# Patient Record
Sex: FEMALE | NOT HISPANIC OR LATINO | Employment: OTHER | URBAN - METROPOLITAN AREA
[De-identification: names, ages, dates, MRNs, and addresses within clinical notes are randomized per-mention and may not be internally consistent; named-entity substitution may affect disease eponyms.]

---

## 2017-01-10 ENCOUNTER — COMMUNICATION - HEALTHEAST (OUTPATIENT)
Dept: FAMILY MEDICINE | Facility: CLINIC | Age: 62
End: 2017-01-10

## 2017-01-10 DIAGNOSIS — E78.5 HYPERLIPIDEMIA: ICD-10-CM

## 2017-02-16 ENCOUNTER — OFFICE VISIT - HEALTHEAST (OUTPATIENT)
Dept: FAMILY MEDICINE | Facility: CLINIC | Age: 62
End: 2017-02-16

## 2017-02-16 DIAGNOSIS — E78.5 HYPERLIPIDEMIA: ICD-10-CM

## 2017-02-16 DIAGNOSIS — M81.0 OSTEOPOROSIS: ICD-10-CM

## 2017-02-16 DIAGNOSIS — Z00.00 ROUTINE GENERAL MEDICAL EXAMINATION AT A HEALTH CARE FACILITY: ICD-10-CM

## 2017-02-16 DIAGNOSIS — D56.8 OTHER THALASSEMIA (H): ICD-10-CM

## 2017-02-16 DIAGNOSIS — E55.9 VITAMIN D DEFICIENCY: ICD-10-CM

## 2017-02-16 DIAGNOSIS — E78.5 HYPERLIPIDEMIA, UNSPECIFIED HYPERLIPIDEMIA TYPE: ICD-10-CM

## 2017-02-16 ASSESSMENT — MIFFLIN-ST. JEOR: SCORE: 1061.6

## 2017-02-23 ENCOUNTER — AMBULATORY - HEALTHEAST (OUTPATIENT)
Dept: LAB | Facility: CLINIC | Age: 62
End: 2017-02-23

## 2017-02-23 DIAGNOSIS — E55.9 VITAMIN D DEFICIENCY: ICD-10-CM

## 2017-02-23 DIAGNOSIS — Z00.00 ROUTINE GENERAL MEDICAL EXAMINATION AT A HEALTH CARE FACILITY: ICD-10-CM

## 2017-02-23 DIAGNOSIS — E78.5 HYPERLIPIDEMIA, UNSPECIFIED HYPERLIPIDEMIA TYPE: ICD-10-CM

## 2017-02-23 DIAGNOSIS — M81.0 OSTEOPOROSIS: ICD-10-CM

## 2017-02-23 DIAGNOSIS — D56.8 OTHER THALASSEMIA (H): ICD-10-CM

## 2017-02-23 LAB
CHOLEST SERPL-MCNC: 161 MG/DL
FASTING STATUS PATIENT QL REPORTED: YES
HDLC SERPL-MCNC: 46 MG/DL
LDLC SERPL CALC-MCNC: 96 MG/DL
TRIGL SERPL-MCNC: 96 MG/DL

## 2017-02-24 ENCOUNTER — COMMUNICATION - HEALTHEAST (OUTPATIENT)
Dept: FAMILY MEDICINE | Facility: CLINIC | Age: 62
End: 2017-02-24

## 2017-03-02 ENCOUNTER — AMBULATORY - HEALTHEAST (OUTPATIENT)
Dept: LAB | Facility: CLINIC | Age: 62
End: 2017-03-02

## 2017-03-02 DIAGNOSIS — Z00.00 ROUTINE GENERAL MEDICAL EXAMINATION AT A HEALTH CARE FACILITY: ICD-10-CM

## 2017-03-03 ENCOUNTER — COMMUNICATION - HEALTHEAST (OUTPATIENT)
Dept: FAMILY MEDICINE | Facility: CLINIC | Age: 62
End: 2017-03-03

## 2017-03-03 DIAGNOSIS — E78.5 HYPERLIPIDEMIA: ICD-10-CM

## 2017-03-09 ENCOUNTER — COMMUNICATION - HEALTHEAST (OUTPATIENT)
Dept: FAMILY MEDICINE | Facility: CLINIC | Age: 62
End: 2017-03-09

## 2017-06-06 ENCOUNTER — RECORDS - HEALTHEAST (OUTPATIENT)
Dept: BONE DENSITY | Facility: CLINIC | Age: 62
End: 2017-06-06

## 2017-06-06 ENCOUNTER — OFFICE VISIT - HEALTHEAST (OUTPATIENT)
Dept: FAMILY MEDICINE | Facility: CLINIC | Age: 62
End: 2017-06-06

## 2017-06-06 ENCOUNTER — RECORDS - HEALTHEAST (OUTPATIENT)
Dept: ADMINISTRATIVE | Facility: OTHER | Age: 62
End: 2017-06-06

## 2017-06-06 DIAGNOSIS — J00 ACUTE NASOPHARYNGITIS: ICD-10-CM

## 2017-06-06 DIAGNOSIS — M81.0 AGE-RELATED OSTEOPOROSIS WITHOUT CURRENT PATHOLOGICAL FRACTURE: ICD-10-CM

## 2017-06-06 ASSESSMENT — MIFFLIN-ST. JEOR: SCORE: 1038.02

## 2017-07-20 ENCOUNTER — OFFICE VISIT - HEALTHEAST (OUTPATIENT)
Dept: INTERNAL MEDICINE | Facility: CLINIC | Age: 62
End: 2017-07-20

## 2017-07-20 DIAGNOSIS — M81.0 OSTEOPOROSIS: ICD-10-CM

## 2018-01-25 ENCOUNTER — AMBULATORY - HEALTHEAST (OUTPATIENT)
Dept: NURSING | Facility: CLINIC | Age: 63
End: 2018-01-25

## 2018-01-25 DIAGNOSIS — M81.0 OSTEOPOROSIS: ICD-10-CM

## 2018-03-02 ENCOUNTER — OFFICE VISIT - HEALTHEAST (OUTPATIENT)
Dept: FAMILY MEDICINE | Facility: CLINIC | Age: 63
End: 2018-03-02

## 2018-03-02 DIAGNOSIS — M81.0 OSTEOPOROSIS: ICD-10-CM

## 2018-03-02 DIAGNOSIS — L90.5 BURN SCAR: ICD-10-CM

## 2018-03-02 DIAGNOSIS — E78.5 HYPERLIPIDEMIA, UNSPECIFIED HYPERLIPIDEMIA TYPE: ICD-10-CM

## 2018-03-02 DIAGNOSIS — Z00.00 ROUTINE GENERAL MEDICAL EXAMINATION AT A HEALTH CARE FACILITY: ICD-10-CM

## 2018-03-02 DIAGNOSIS — E55.9 VITAMIN D DEFICIENCY: ICD-10-CM

## 2018-03-02 DIAGNOSIS — Z12.31 VISIT FOR SCREENING MAMMOGRAM: ICD-10-CM

## 2018-03-02 DIAGNOSIS — M25.561 RIGHT KNEE PAIN: ICD-10-CM

## 2018-03-02 LAB
ALBUMIN SERPL-MCNC: 3.6 G/DL (ref 3.5–5)
ALP SERPL-CCNC: 70 U/L (ref 45–120)
ALT SERPL W P-5'-P-CCNC: 20 U/L (ref 0–45)
ANION GAP SERPL CALCULATED.3IONS-SCNC: 8 MMOL/L (ref 5–18)
AST SERPL W P-5'-P-CCNC: 15 U/L (ref 0–40)
BILIRUB SERPL-MCNC: 1 MG/DL (ref 0–1)
BUN SERPL-MCNC: 15 MG/DL (ref 8–22)
CALCIUM SERPL-MCNC: 10 MG/DL (ref 8.5–10.5)
CHLORIDE BLD-SCNC: 106 MMOL/L (ref 98–107)
CHOLEST SERPL-MCNC: 178 MG/DL
CO2 SERPL-SCNC: 25 MMOL/L (ref 22–31)
CREAT SERPL-MCNC: 0.74 MG/DL (ref 0.6–1.1)
FASTING STATUS PATIENT QL REPORTED: YES
GFR SERPL CREATININE-BSD FRML MDRD: >60 ML/MIN/1.73M2
GLUCOSE BLD-MCNC: 87 MG/DL (ref 70–125)
HDLC SERPL-MCNC: 49 MG/DL
LDLC SERPL CALC-MCNC: 102 MG/DL
POTASSIUM BLD-SCNC: 4.2 MMOL/L (ref 3.5–5)
PROT SERPL-MCNC: 7.2 G/DL (ref 6–8)
SODIUM SERPL-SCNC: 139 MMOL/L (ref 136–145)
TRIGL SERPL-MCNC: 137 MG/DL

## 2018-03-02 ASSESSMENT — MIFFLIN-ST. JEOR: SCORE: 1066.14

## 2018-03-05 ENCOUNTER — COMMUNICATION - HEALTHEAST (OUTPATIENT)
Dept: FAMILY MEDICINE | Facility: CLINIC | Age: 63
End: 2018-03-05

## 2018-03-05 LAB — 25(OH)D3 SERPL-MCNC: 61.7 NG/ML (ref 30–80)

## 2018-03-13 ENCOUNTER — HOSPITAL ENCOUNTER (OUTPATIENT)
Dept: MAMMOGRAPHY | Facility: CLINIC | Age: 63
Discharge: HOME | End: 2018-03-13
Attending: FAMILY MEDICINE

## 2018-03-13 DIAGNOSIS — Z12.31 VISIT FOR SCREENING MAMMOGRAM: ICD-10-CM

## 2018-03-16 ENCOUNTER — AMBULATORY - HEALTHEAST (OUTPATIENT)
Dept: LAB | Facility: CLINIC | Age: 63
End: 2018-03-16

## 2018-03-16 DIAGNOSIS — Z00.00 ROUTINE GENERAL MEDICAL EXAMINATION AT A HEALTH CARE FACILITY: ICD-10-CM

## 2018-03-16 LAB
HEMOCCULT SP1 STL QL: NEGATIVE
HEMOCCULT SP2 STL QL: NEGATIVE
HEMOCCULT SP3 STL QL: NEGATIVE

## 2018-03-17 ENCOUNTER — COMMUNICATION - HEALTHEAST (OUTPATIENT)
Dept: FAMILY MEDICINE | Facility: CLINIC | Age: 63
End: 2018-03-17

## 2018-03-17 DIAGNOSIS — E78.5 HYPERLIPIDEMIA: ICD-10-CM

## 2018-03-28 ENCOUNTER — COMMUNICATION - HEALTHEAST (OUTPATIENT)
Dept: FAMILY MEDICINE | Facility: CLINIC | Age: 63
End: 2018-03-28

## 2018-04-03 ENCOUNTER — COMMUNICATION - HEALTHEAST (OUTPATIENT)
Dept: FAMILY MEDICINE | Facility: CLINIC | Age: 63
End: 2018-04-03

## 2018-04-04 ENCOUNTER — AMBULATORY - HEALTHEAST (OUTPATIENT)
Dept: FAMILY MEDICINE | Facility: CLINIC | Age: 63
End: 2018-04-04

## 2018-04-05 ENCOUNTER — AMBULATORY - HEALTHEAST (OUTPATIENT)
Dept: NURSING | Facility: CLINIC | Age: 63
End: 2018-04-05

## 2018-04-06 ENCOUNTER — RECORDS - HEALTHEAST (OUTPATIENT)
Dept: ADMINISTRATIVE | Facility: OTHER | Age: 63
End: 2018-04-06

## 2018-04-13 ENCOUNTER — RECORDS - HEALTHEAST (OUTPATIENT)
Dept: ADMINISTRATIVE | Facility: OTHER | Age: 63
End: 2018-04-13

## 2018-04-20 ENCOUNTER — RECORDS - HEALTHEAST (OUTPATIENT)
Dept: ADMINISTRATIVE | Facility: OTHER | Age: 63
End: 2018-04-20

## 2018-04-26 ENCOUNTER — COMMUNICATION - HEALTHEAST (OUTPATIENT)
Dept: FAMILY MEDICINE | Facility: CLINIC | Age: 63
End: 2018-04-26

## 2018-07-27 ENCOUNTER — OFFICE VISIT - HEALTHEAST (OUTPATIENT)
Dept: INTERNAL MEDICINE | Facility: CLINIC | Age: 63
End: 2018-07-27

## 2018-07-27 DIAGNOSIS — M81.0 AGE-RELATED OSTEOPOROSIS WITHOUT CURRENT PATHOLOGICAL FRACTURE: ICD-10-CM

## 2018-10-05 ENCOUNTER — OFFICE VISIT - HEALTHEAST (OUTPATIENT)
Dept: FAMILY MEDICINE | Facility: CLINIC | Age: 63
End: 2018-10-05

## 2018-10-05 ENCOUNTER — COMMUNICATION - HEALTHEAST (OUTPATIENT)
Dept: FAMILY MEDICINE | Facility: CLINIC | Age: 63
End: 2018-10-05

## 2018-10-05 DIAGNOSIS — Z71.84 TRAVEL ADVICE ENCOUNTER: ICD-10-CM

## 2018-10-05 DIAGNOSIS — E78.5 HYPERLIPIDEMIA: ICD-10-CM

## 2019-02-08 ENCOUNTER — AMBULATORY - HEALTHEAST (OUTPATIENT)
Dept: NURSING | Facility: CLINIC | Age: 64
End: 2019-02-08

## 2019-03-19 ENCOUNTER — COMMUNICATION - HEALTHEAST (OUTPATIENT)
Dept: FAMILY MEDICINE | Facility: CLINIC | Age: 64
End: 2019-03-19

## 2019-03-19 DIAGNOSIS — E78.5 HYPERLIPIDEMIA, UNSPECIFIED HYPERLIPIDEMIA TYPE: ICD-10-CM

## 2019-03-22 ENCOUNTER — OFFICE VISIT - HEALTHEAST (OUTPATIENT)
Dept: FAMILY MEDICINE | Facility: CLINIC | Age: 64
End: 2019-03-22

## 2019-03-22 DIAGNOSIS — Z00.00 ROUTINE GENERAL MEDICAL EXAMINATION AT A HEALTH CARE FACILITY: ICD-10-CM

## 2019-03-22 DIAGNOSIS — E78.5 HYPERLIPIDEMIA, UNSPECIFIED HYPERLIPIDEMIA TYPE: ICD-10-CM

## 2019-03-22 DIAGNOSIS — D56.8 OTHER THALASSEMIA (H): ICD-10-CM

## 2019-03-22 LAB
ALBUMIN SERPL-MCNC: 3.9 G/DL (ref 3.5–5)
ALP SERPL-CCNC: 67 U/L (ref 45–120)
ALT SERPL W P-5'-P-CCNC: 19 U/L (ref 0–45)
ANION GAP SERPL CALCULATED.3IONS-SCNC: 8 MMOL/L (ref 5–18)
AST SERPL W P-5'-P-CCNC: 16 U/L (ref 0–40)
BILIRUB SERPL-MCNC: 0.9 MG/DL (ref 0–1)
BUN SERPL-MCNC: 12 MG/DL (ref 8–22)
CALCIUM SERPL-MCNC: 9.5 MG/DL (ref 8.5–10.5)
CHLORIDE BLD-SCNC: 107 MMOL/L (ref 98–107)
CHOLEST SERPL-MCNC: 175 MG/DL
CO2 SERPL-SCNC: 25 MMOL/L (ref 22–31)
CREAT SERPL-MCNC: 0.81 MG/DL (ref 0.6–1.1)
FASTING STATUS PATIENT QL REPORTED: YES
GFR SERPL CREATININE-BSD FRML MDRD: >60 ML/MIN/1.73M2
GLUCOSE BLD-MCNC: 91 MG/DL (ref 70–125)
HDLC SERPL-MCNC: 49 MG/DL
LDLC SERPL CALC-MCNC: 98 MG/DL
POTASSIUM BLD-SCNC: 4.2 MMOL/L (ref 3.5–5)
PROT SERPL-MCNC: 7.2 G/DL (ref 6–8)
SODIUM SERPL-SCNC: 140 MMOL/L (ref 136–145)
TRIGL SERPL-MCNC: 138 MG/DL

## 2019-03-22 ASSESSMENT — MIFFLIN-ST. JEOR: SCORE: 1075.43

## 2019-03-25 LAB
HPV SOURCE: NORMAL
HUMAN PAPILLOMA VIRUS 16 DNA: NEGATIVE
HUMAN PAPILLOMA VIRUS 18 DNA: NEGATIVE
HUMAN PAPILLOMA VIRUS FINAL DIAGNOSIS: NORMAL
HUMAN PAPILLOMA VIRUS OTHER HR: NEGATIVE
SPECIMEN DESCRIPTION: NORMAL

## 2019-04-02 LAB
BKR LAB AP ABNORMAL BLEEDING: NO
BKR LAB AP BIRTH CONTROL/HORMONES: ABNORMAL
BKR LAB AP CERVICAL APPEARANCE: ABNORMAL
BKR LAB AP GYN ADEQUACY: ABNORMAL
BKR LAB AP GYN INTERPRETATION: ABNORMAL
BKR LAB AP HPV REFLEX: ABNORMAL
BKR LAB AP LMP: ABNORMAL
BKR LAB AP PATIENT STATUS: ABNORMAL
BKR LAB AP PREVIOUS ABNORMAL: ABNORMAL
BKR LAB AP PREVIOUS NORMAL: ABNORMAL
HIGH RISK?: NO
INTERPRETING LAB: ABNORMAL
PATH REPORT.COMMENTS IMP SPEC: ABNORMAL
RESULT FLAG (HE HISTORICAL CONVERSION): ABNORMAL

## 2019-04-03 ENCOUNTER — COMMUNICATION - HEALTHEAST (OUTPATIENT)
Dept: FAMILY MEDICINE | Facility: CLINIC | Age: 64
End: 2019-04-03

## 2019-04-04 ENCOUNTER — RECORDS - HEALTHEAST (OUTPATIENT)
Dept: ADMINISTRATIVE | Facility: OTHER | Age: 64
End: 2019-04-04

## 2019-04-04 LAB — COLOGUARD-ABSTRACT: NEGATIVE

## 2019-04-11 ENCOUNTER — HOSPITAL ENCOUNTER (OUTPATIENT)
Dept: MAMMOGRAPHY | Facility: CLINIC | Age: 64
Discharge: HOME | End: 2019-04-11
Attending: FAMILY MEDICINE

## 2019-04-11 DIAGNOSIS — E78.5 HYPERLIPIDEMIA, UNSPECIFIED HYPERLIPIDEMIA TYPE: ICD-10-CM

## 2019-04-23 ENCOUNTER — RECORDS - HEALTHEAST (OUTPATIENT)
Dept: HEALTH INFORMATION MANAGEMENT | Facility: CLINIC | Age: 64
End: 2019-04-23

## 2019-04-23 ENCOUNTER — COMMUNICATION - HEALTHEAST (OUTPATIENT)
Dept: FAMILY MEDICINE | Facility: CLINIC | Age: 64
End: 2019-04-23

## 2019-04-28 ENCOUNTER — COMMUNICATION - HEALTHEAST (OUTPATIENT)
Dept: FAMILY MEDICINE | Facility: CLINIC | Age: 64
End: 2019-04-28

## 2019-05-09 ENCOUNTER — COMMUNICATION - HEALTHEAST (OUTPATIENT)
Dept: FAMILY MEDICINE | Facility: CLINIC | Age: 64
End: 2019-05-09

## 2019-05-10 ENCOUNTER — COMMUNICATION - HEALTHEAST (OUTPATIENT)
Dept: FAMILY MEDICINE | Facility: CLINIC | Age: 64
End: 2019-05-10

## 2019-06-11 ENCOUNTER — RECORDS - HEALTHEAST (OUTPATIENT)
Dept: BONE DENSITY | Facility: CLINIC | Age: 64
End: 2019-06-11

## 2019-06-11 ENCOUNTER — RECORDS - HEALTHEAST (OUTPATIENT)
Dept: ADMINISTRATIVE | Facility: OTHER | Age: 64
End: 2019-06-11

## 2019-06-11 DIAGNOSIS — M81.0 AGE-RELATED OSTEOPOROSIS WITHOUT CURRENT PATHOLOGICAL FRACTURE: ICD-10-CM

## 2019-08-08 ENCOUNTER — OFFICE VISIT - HEALTHEAST (OUTPATIENT)
Dept: INTERNAL MEDICINE | Facility: CLINIC | Age: 64
End: 2019-08-08

## 2019-08-08 DIAGNOSIS — M81.0 AGE-RELATED OSTEOPOROSIS WITHOUT CURRENT PATHOLOGICAL FRACTURE: ICD-10-CM

## 2019-10-03 ENCOUNTER — COMMUNICATION - HEALTHEAST (OUTPATIENT)
Dept: FAMILY MEDICINE | Facility: CLINIC | Age: 64
End: 2019-10-03

## 2019-10-28 ENCOUNTER — OFFICE VISIT - HEALTHEAST (OUTPATIENT)
Dept: FAMILY MEDICINE | Facility: CLINIC | Age: 64
End: 2019-10-28

## 2019-10-28 DIAGNOSIS — S92.421A DISPLACED FRACTURE OF DISTAL PHALANX OF RIGHT GREAT TOE, INITIAL ENCOUNTER FOR CLOSED FRACTURE: ICD-10-CM

## 2019-10-28 DIAGNOSIS — S99.922A INJURY OF LEFT GREAT TOE, INITIAL ENCOUNTER: ICD-10-CM

## 2019-10-29 ENCOUNTER — RECORDS - HEALTHEAST (OUTPATIENT)
Dept: ADMINISTRATIVE | Facility: OTHER | Age: 64
End: 2019-10-29

## 2019-11-05 ENCOUNTER — RECORDS - HEALTHEAST (OUTPATIENT)
Dept: ADMINISTRATIVE | Facility: OTHER | Age: 64
End: 2019-11-05

## 2019-12-04 ENCOUNTER — RECORDS - HEALTHEAST (OUTPATIENT)
Dept: ADMINISTRATIVE | Facility: OTHER | Age: 64
End: 2019-12-04

## 2020-01-21 ENCOUNTER — AMBULATORY - HEALTHEAST (OUTPATIENT)
Dept: INTERNAL MEDICINE | Facility: CLINIC | Age: 65
End: 2020-01-21

## 2020-01-21 DIAGNOSIS — M81.0 AGE-RELATED OSTEOPOROSIS WITHOUT CURRENT PATHOLOGICAL FRACTURE: ICD-10-CM

## 2020-01-23 ENCOUNTER — AMBULATORY - HEALTHEAST (OUTPATIENT)
Dept: INTERNAL MEDICINE | Facility: CLINIC | Age: 65
End: 2020-01-23

## 2020-01-23 DIAGNOSIS — M81.0 AGE-RELATED OSTEOPOROSIS WITHOUT CURRENT PATHOLOGICAL FRACTURE: ICD-10-CM

## 2020-02-03 ENCOUNTER — COMMUNICATION - HEALTHEAST (OUTPATIENT)
Dept: INTERNAL MEDICINE | Facility: CLINIC | Age: 65
End: 2020-02-03

## 2020-02-03 DIAGNOSIS — M81.8 OSTEOPOROSIS, IDIOPATHIC: ICD-10-CM

## 2020-02-03 DIAGNOSIS — M81.0 AGE-RELATED OSTEOPOROSIS WITHOUT CURRENT PATHOLOGICAL FRACTURE: ICD-10-CM

## 2020-02-11 ENCOUNTER — AMBULATORY - HEALTHEAST (OUTPATIENT)
Dept: NURSING | Facility: CLINIC | Age: 65
End: 2020-02-11

## 2020-06-09 ENCOUNTER — COMMUNICATION - HEALTHEAST (OUTPATIENT)
Dept: FAMILY MEDICINE | Facility: CLINIC | Age: 65
End: 2020-06-09

## 2020-06-11 ENCOUNTER — COMMUNICATION - HEALTHEAST (OUTPATIENT)
Dept: CARDIOLOGY | Facility: CLINIC | Age: 65
End: 2020-06-11

## 2020-06-12 ENCOUNTER — OFFICE VISIT - HEALTHEAST (OUTPATIENT)
Dept: CARDIOLOGY | Facility: CLINIC | Age: 65
End: 2020-06-12

## 2020-06-12 DIAGNOSIS — R07.2 PRECORDIAL PAIN: ICD-10-CM

## 2020-06-12 ASSESSMENT — MIFFLIN-ST. JEOR: SCORE: 1097.2

## 2020-06-30 ENCOUNTER — HOSPITAL ENCOUNTER (OUTPATIENT)
Dept: CT IMAGING | Facility: CLINIC | Age: 65
Discharge: HOME | End: 2020-06-30
Attending: INTERNAL MEDICINE

## 2020-06-30 DIAGNOSIS — R07.2 PRECORDIAL PAIN: ICD-10-CM

## 2020-06-30 LAB
BSA FOR ECHO PROCEDURE: 0 M2
CV CALCIUM SCORE AGATSTON LM: 17
CV CALCIUM SCORING AGATSON LAD: 0
CV CALCIUM SCORING AGATSTON CX: 0
CV CALCIUM SCORING AGATSTON RCA: 0
CV CALCIUM SCORING AGATSTON TOTAL: 17
LEFT VENTRICLE HEART RATE: 63 BPM

## 2020-07-02 ENCOUNTER — COMMUNICATION - HEALTHEAST (OUTPATIENT)
Dept: CARDIOLOGY | Facility: CLINIC | Age: 65
End: 2020-07-02

## 2020-07-02 DIAGNOSIS — R07.2 PRECORDIAL PAIN: ICD-10-CM

## 2020-07-06 ENCOUNTER — AMBULATORY - HEALTHEAST (OUTPATIENT)
Dept: CARDIOLOGY | Facility: CLINIC | Age: 65
End: 2020-07-06

## 2020-07-06 DIAGNOSIS — Z11.59 ENCOUNTER FOR SCREENING FOR OTHER VIRAL DISEASES: ICD-10-CM

## 2020-07-13 ENCOUNTER — COMMUNICATION - HEALTHEAST (OUTPATIENT)
Dept: CARDIOLOGY | Facility: CLINIC | Age: 65
End: 2020-07-13

## 2020-07-14 ENCOUNTER — OFFICE VISIT - HEALTHEAST (OUTPATIENT)
Dept: CARDIOLOGY | Facility: CLINIC | Age: 65
End: 2020-07-14

## 2020-07-14 ENCOUNTER — SURGERY - HEALTHEAST (OUTPATIENT)
Dept: CARDIOLOGY | Facility: CLINIC | Age: 65
End: 2020-07-14

## 2020-07-14 ENCOUNTER — COMMUNICATION - HEALTHEAST (OUTPATIENT)
Dept: CARDIOLOGY | Facility: CLINIC | Age: 65
End: 2020-07-14

## 2020-07-14 DIAGNOSIS — I25.119 CORONARY ARTERY DISEASE INVOLVING NATIVE CORONARY ARTERY OF NATIVE HEART WITH ANGINA PECTORIS (H): ICD-10-CM

## 2020-07-14 ASSESSMENT — MIFFLIN-ST. JEOR: SCORE: 1079.06

## 2020-07-17 ENCOUNTER — OFFICE VISIT - HEALTHEAST (OUTPATIENT)
Dept: FAMILY MEDICINE | Facility: CLINIC | Age: 65
End: 2020-07-17

## 2020-07-17 DIAGNOSIS — Z00.01 ENCOUNTER FOR GENERAL ADULT MEDICAL EXAMINATION WITH ABNORMAL FINDINGS: ICD-10-CM

## 2020-07-17 DIAGNOSIS — E78.5 HYPERLIPIDEMIA, UNSPECIFIED HYPERLIPIDEMIA TYPE: ICD-10-CM

## 2020-07-17 DIAGNOSIS — I25.119 CORONARY ARTERY DISEASE INVOLVING NATIVE CORONARY ARTERY OF NATIVE HEART WITH ANGINA PECTORIS (H): ICD-10-CM

## 2020-07-17 DIAGNOSIS — Z13.220 ENCOUNTER FOR SCREENING FOR LIPOID DISORDERS: ICD-10-CM

## 2020-07-17 DIAGNOSIS — Z13.9 SCREENING FOR CONDITION: ICD-10-CM

## 2020-07-17 DIAGNOSIS — M81.0 AGE-RELATED OSTEOPOROSIS WITHOUT CURRENT PATHOLOGICAL FRACTURE: ICD-10-CM

## 2020-07-17 DIAGNOSIS — D56.8 OTHER THALASSEMIA (H): ICD-10-CM

## 2020-07-17 DIAGNOSIS — Z12.31 ENCOUNTER FOR SCREENING MAMMOGRAM FOR MALIGNANT NEOPLASM OF BREAST: ICD-10-CM

## 2020-07-17 LAB
ALBUMIN SERPL-MCNC: 3.9 G/DL (ref 3.5–5)
ALP SERPL-CCNC: 61 U/L (ref 45–120)
ALT SERPL W P-5'-P-CCNC: 16 U/L (ref 0–45)
ANION GAP SERPL CALCULATED.3IONS-SCNC: 7 MMOL/L (ref 5–18)
AST SERPL W P-5'-P-CCNC: 15 U/L (ref 0–40)
BILIRUB SERPL-MCNC: 0.9 MG/DL (ref 0–1)
BUN SERPL-MCNC: 16 MG/DL (ref 8–22)
CALCIUM SERPL-MCNC: 10.6 MG/DL (ref 8.5–10.5)
CHLORIDE BLD-SCNC: 106 MMOL/L (ref 98–107)
CHOLEST SERPL-MCNC: 155 MG/DL
CO2 SERPL-SCNC: 27 MMOL/L (ref 22–31)
CREAT SERPL-MCNC: 0.82 MG/DL (ref 0.6–1.1)
FASTING STATUS PATIENT QL REPORTED: YES
GFR SERPL CREATININE-BSD FRML MDRD: >60 ML/MIN/1.73M2
GLUCOSE BLD-MCNC: 93 MG/DL (ref 70–125)
HDLC SERPL-MCNC: 40 MG/DL
LDLC SERPL CALC-MCNC: 86 MG/DL
POTASSIUM BLD-SCNC: 4.5 MMOL/L (ref 3.5–5)
PROT SERPL-MCNC: 7.2 G/DL (ref 6–8)
SODIUM SERPL-SCNC: 140 MMOL/L (ref 136–145)
TRIGL SERPL-MCNC: 146 MG/DL

## 2020-07-17 ASSESSMENT — MIFFLIN-ST. JEOR: SCORE: 1074.31

## 2020-07-18 ENCOUNTER — AMBULATORY - HEALTHEAST (OUTPATIENT)
Dept: FAMILY MEDICINE | Facility: CLINIC | Age: 65
End: 2020-07-18

## 2020-07-18 DIAGNOSIS — Z11.59 ENCOUNTER FOR SCREENING FOR OTHER VIRAL DISEASES: ICD-10-CM

## 2020-07-20 ENCOUNTER — SURGERY - HEALTHEAST (OUTPATIENT)
Dept: CARDIOLOGY | Facility: CLINIC | Age: 65
End: 2020-07-20

## 2020-07-20 ENCOUNTER — AMBULATORY - HEALTHEAST (OUTPATIENT)
Dept: CARDIAC REHAB | Facility: CLINIC | Age: 65
End: 2020-07-20

## 2020-07-20 ENCOUNTER — COMMUNICATION - HEALTHEAST (OUTPATIENT)
Dept: FAMILY MEDICINE | Facility: CLINIC | Age: 65
End: 2020-07-20

## 2020-07-20 DIAGNOSIS — Z95.5 STENTED CORONARY ARTERY: ICD-10-CM

## 2020-07-20 LAB — HCV AB SERPL QL IA: NEGATIVE

## 2020-07-20 ASSESSMENT — MIFFLIN-ST. JEOR: SCORE: 1092.67

## 2020-07-27 ENCOUNTER — AMBULATORY - HEALTHEAST (OUTPATIENT)
Dept: CARDIAC REHAB | Facility: CLINIC | Age: 65
End: 2020-07-27

## 2020-07-27 DIAGNOSIS — I25.10 CORONARY ARTERY DISEASE INVOLVING NATIVE CORONARY ARTERY OF NATIVE HEART, ANGINA PRESENCE UNSPECIFIED: ICD-10-CM

## 2020-07-27 DIAGNOSIS — Z95.5 STENTED CORONARY ARTERY: ICD-10-CM

## 2020-07-31 ENCOUNTER — AMBULATORY - HEALTHEAST (OUTPATIENT)
Dept: CARDIAC REHAB | Facility: CLINIC | Age: 65
End: 2020-07-31

## 2020-07-31 DIAGNOSIS — Z95.5 STENTED CORONARY ARTERY: ICD-10-CM

## 2020-08-03 ENCOUNTER — AMBULATORY - HEALTHEAST (OUTPATIENT)
Dept: CARDIAC REHAB | Facility: CLINIC | Age: 65
End: 2020-08-03

## 2020-08-03 DIAGNOSIS — Z95.5 STENTED CORONARY ARTERY: ICD-10-CM

## 2020-08-04 ENCOUNTER — COMMUNICATION - HEALTHEAST (OUTPATIENT)
Dept: CARDIOLOGY | Facility: CLINIC | Age: 65
End: 2020-08-04

## 2020-08-05 ENCOUNTER — OFFICE VISIT - HEALTHEAST (OUTPATIENT)
Dept: CARDIOLOGY | Facility: CLINIC | Age: 65
End: 2020-08-05

## 2020-08-05 DIAGNOSIS — R03.0 ELEVATED BLOOD PRESSURE READING WITHOUT DIAGNOSIS OF HYPERTENSION: ICD-10-CM

## 2020-08-05 DIAGNOSIS — E78.5 HYPERLIPIDEMIA, UNSPECIFIED HYPERLIPIDEMIA TYPE: ICD-10-CM

## 2020-08-05 DIAGNOSIS — I25.10 CORONARY ARTERY DISEASE INVOLVING NATIVE CORONARY ARTERY OF NATIVE HEART, ANGINA PRESENCE UNSPECIFIED: ICD-10-CM

## 2020-08-05 DIAGNOSIS — R07.2 PRECORDIAL PAIN: ICD-10-CM

## 2020-08-05 ASSESSMENT — MIFFLIN-ST. JEOR: SCORE: 1091.99

## 2020-08-07 ENCOUNTER — AMBULATORY - HEALTHEAST (OUTPATIENT)
Dept: INTERNAL MEDICINE | Facility: CLINIC | Age: 65
End: 2020-08-07

## 2020-08-07 ENCOUNTER — AMBULATORY - HEALTHEAST (OUTPATIENT)
Dept: CARDIAC REHAB | Facility: CLINIC | Age: 65
End: 2020-08-07

## 2020-08-07 DIAGNOSIS — M81.0 AGE-RELATED OSTEOPOROSIS WITHOUT CURRENT PATHOLOGICAL FRACTURE: ICD-10-CM

## 2020-08-07 DIAGNOSIS — Z95.5 STENTED CORONARY ARTERY: ICD-10-CM

## 2020-08-10 ENCOUNTER — AMBULATORY - HEALTHEAST (OUTPATIENT)
Dept: CARDIAC REHAB | Facility: CLINIC | Age: 65
End: 2020-08-10

## 2020-08-10 DIAGNOSIS — Z95.5 STENTED CORONARY ARTERY: ICD-10-CM

## 2020-08-11 ENCOUNTER — OFFICE VISIT - HEALTHEAST (OUTPATIENT)
Dept: INTERNAL MEDICINE | Facility: CLINIC | Age: 65
End: 2020-08-11

## 2020-08-11 DIAGNOSIS — M81.0 AGE-RELATED OSTEOPOROSIS WITHOUT CURRENT PATHOLOGICAL FRACTURE: ICD-10-CM

## 2020-08-11 DIAGNOSIS — M79.89 BILATERAL SWELLING OF FEET: ICD-10-CM

## 2020-08-11 DIAGNOSIS — M54.2 NECK PAIN: ICD-10-CM

## 2020-08-11 DIAGNOSIS — R09.89 RUNNY NOSE: ICD-10-CM

## 2020-08-14 ENCOUNTER — AMBULATORY - HEALTHEAST (OUTPATIENT)
Dept: CARDIAC REHAB | Facility: CLINIC | Age: 65
End: 2020-08-14

## 2020-08-14 DIAGNOSIS — Z95.5 STENTED CORONARY ARTERY: ICD-10-CM

## 2020-08-17 ENCOUNTER — AMBULATORY - HEALTHEAST (OUTPATIENT)
Dept: CARDIAC REHAB | Facility: CLINIC | Age: 65
End: 2020-08-17

## 2020-08-17 DIAGNOSIS — Z95.5 STENTED CORONARY ARTERY: ICD-10-CM

## 2020-08-21 ENCOUNTER — AMBULATORY - HEALTHEAST (OUTPATIENT)
Dept: CARDIAC REHAB | Facility: CLINIC | Age: 65
End: 2020-08-21

## 2020-08-21 DIAGNOSIS — Z95.5 STENTED CORONARY ARTERY: ICD-10-CM

## 2020-08-24 ENCOUNTER — AMBULATORY - HEALTHEAST (OUTPATIENT)
Dept: CARDIAC REHAB | Facility: CLINIC | Age: 65
End: 2020-08-24

## 2020-08-24 DIAGNOSIS — Z95.5 STENTED CORONARY ARTERY: ICD-10-CM

## 2020-08-28 ENCOUNTER — AMBULATORY - HEALTHEAST (OUTPATIENT)
Dept: CARDIAC REHAB | Facility: CLINIC | Age: 65
End: 2020-08-28

## 2020-08-28 DIAGNOSIS — Z95.5 STENTED CORONARY ARTERY: ICD-10-CM

## 2020-08-31 ENCOUNTER — AMBULATORY - HEALTHEAST (OUTPATIENT)
Dept: CARDIAC REHAB | Facility: CLINIC | Age: 65
End: 2020-08-31

## 2020-08-31 DIAGNOSIS — Z95.5 STENTED CORONARY ARTERY: ICD-10-CM

## 2020-09-04 ENCOUNTER — AMBULATORY - HEALTHEAST (OUTPATIENT)
Dept: CARDIAC REHAB | Facility: CLINIC | Age: 65
End: 2020-09-04

## 2020-09-04 DIAGNOSIS — Z95.5 STENTED CORONARY ARTERY: ICD-10-CM

## 2020-09-11 ENCOUNTER — AMBULATORY - HEALTHEAST (OUTPATIENT)
Dept: CARDIAC REHAB | Facility: CLINIC | Age: 65
End: 2020-09-11

## 2020-09-11 ENCOUNTER — COMMUNICATION - HEALTHEAST (OUTPATIENT)
Dept: CARDIOLOGY | Facility: CLINIC | Age: 65
End: 2020-09-11

## 2020-09-11 DIAGNOSIS — Z95.5 STENTED CORONARY ARTERY: ICD-10-CM

## 2020-09-14 ENCOUNTER — AMBULATORY - HEALTHEAST (OUTPATIENT)
Dept: CARDIAC REHAB | Facility: CLINIC | Age: 65
End: 2020-09-14

## 2020-09-14 ENCOUNTER — OFFICE VISIT - HEALTHEAST (OUTPATIENT)
Dept: CARDIOLOGY | Facility: CLINIC | Age: 65
End: 2020-09-14

## 2020-09-14 DIAGNOSIS — I25.10 CORONARY ARTERY DISEASE INVOLVING NATIVE CORONARY ARTERY OF NATIVE HEART, ANGINA PRESENCE UNSPECIFIED: ICD-10-CM

## 2020-09-14 DIAGNOSIS — Z95.5 STENTED CORONARY ARTERY: ICD-10-CM

## 2020-09-14 LAB
CHOLEST SERPL-MCNC: 119 MG/DL
FASTING STATUS PATIENT QL REPORTED: NO
HDLC SERPL-MCNC: 37 MG/DL
LDLC SERPL CALC-MCNC: 45 MG/DL
TRIGL SERPL-MCNC: 183 MG/DL

## 2020-09-14 ASSESSMENT — MIFFLIN-ST. JEOR: SCORE: 1098.34

## 2020-09-18 ENCOUNTER — AMBULATORY - HEALTHEAST (OUTPATIENT)
Dept: CARDIAC REHAB | Facility: CLINIC | Age: 65
End: 2020-09-18

## 2020-09-18 DIAGNOSIS — Z95.5 STENTED CORONARY ARTERY: ICD-10-CM

## 2020-09-19 ENCOUNTER — COMMUNICATION - HEALTHEAST (OUTPATIENT)
Dept: CARDIOLOGY | Facility: CLINIC | Age: 65
End: 2020-09-19

## 2020-09-21 ENCOUNTER — AMBULATORY - HEALTHEAST (OUTPATIENT)
Dept: CARDIAC REHAB | Facility: CLINIC | Age: 65
End: 2020-09-21

## 2020-09-21 DIAGNOSIS — Z95.5 STENTED CORONARY ARTERY: ICD-10-CM

## 2020-09-25 ENCOUNTER — AMBULATORY - HEALTHEAST (OUTPATIENT)
Dept: CARDIAC REHAB | Facility: CLINIC | Age: 65
End: 2020-09-25

## 2020-09-25 DIAGNOSIS — Z95.5 STENTED CORONARY ARTERY: ICD-10-CM

## 2020-09-28 ENCOUNTER — AMBULATORY - HEALTHEAST (OUTPATIENT)
Dept: CARDIAC REHAB | Facility: CLINIC | Age: 65
End: 2020-09-28

## 2020-09-28 DIAGNOSIS — Z95.5 STENTED CORONARY ARTERY: ICD-10-CM

## 2020-10-05 ENCOUNTER — AMBULATORY - HEALTHEAST (OUTPATIENT)
Dept: CARDIAC REHAB | Facility: CLINIC | Age: 65
End: 2020-10-05

## 2020-10-05 DIAGNOSIS — Z95.5 STENTED CORONARY ARTERY: ICD-10-CM

## 2020-10-09 ENCOUNTER — AMBULATORY - HEALTHEAST (OUTPATIENT)
Dept: CARDIAC REHAB | Facility: CLINIC | Age: 65
End: 2020-10-09

## 2020-10-09 DIAGNOSIS — Z95.5 STENTED CORONARY ARTERY: ICD-10-CM

## 2020-10-12 ENCOUNTER — AMBULATORY - HEALTHEAST (OUTPATIENT)
Dept: CARDIAC REHAB | Facility: CLINIC | Age: 65
End: 2020-10-12

## 2020-10-12 DIAGNOSIS — Z95.5 STENTED CORONARY ARTERY: ICD-10-CM

## 2020-10-16 ENCOUNTER — AMBULATORY - HEALTHEAST (OUTPATIENT)
Dept: CARDIAC REHAB | Facility: CLINIC | Age: 65
End: 2020-10-16

## 2020-10-16 DIAGNOSIS — Z95.5 STENTED CORONARY ARTERY: ICD-10-CM

## 2020-10-19 ENCOUNTER — AMBULATORY - HEALTHEAST (OUTPATIENT)
Dept: CARDIAC REHAB | Facility: CLINIC | Age: 65
End: 2020-10-19

## 2020-10-19 DIAGNOSIS — Z95.5 STENTED CORONARY ARTERY: ICD-10-CM

## 2020-10-23 ENCOUNTER — AMBULATORY - HEALTHEAST (OUTPATIENT)
Dept: CARDIAC REHAB | Facility: CLINIC | Age: 65
End: 2020-10-23

## 2020-10-23 DIAGNOSIS — Z95.5 STENTED CORONARY ARTERY: ICD-10-CM

## 2020-10-24 ENCOUNTER — COMMUNICATION - HEALTHEAST (OUTPATIENT)
Dept: FAMILY MEDICINE | Facility: CLINIC | Age: 65
End: 2020-10-24

## 2020-10-26 ENCOUNTER — AMBULATORY - HEALTHEAST (OUTPATIENT)
Dept: CARDIAC REHAB | Facility: CLINIC | Age: 65
End: 2020-10-26

## 2020-10-26 DIAGNOSIS — Z95.5 STENTED CORONARY ARTERY: ICD-10-CM

## 2020-10-30 ENCOUNTER — AMBULATORY - HEALTHEAST (OUTPATIENT)
Dept: CARDIAC REHAB | Facility: CLINIC | Age: 65
End: 2020-10-30

## 2020-10-30 DIAGNOSIS — Z95.5 STENTED CORONARY ARTERY: ICD-10-CM

## 2020-11-02 ENCOUNTER — COMMUNICATION - HEALTHEAST (OUTPATIENT)
Dept: INTERNAL MEDICINE | Facility: CLINIC | Age: 65
End: 2020-11-02

## 2020-11-02 ENCOUNTER — AMBULATORY - HEALTHEAST (OUTPATIENT)
Dept: CARDIAC REHAB | Facility: CLINIC | Age: 65
End: 2020-11-02

## 2020-11-02 DIAGNOSIS — Z95.5 STENTED CORONARY ARTERY: ICD-10-CM

## 2020-11-06 ENCOUNTER — AMBULATORY - HEALTHEAST (OUTPATIENT)
Dept: NURSING | Facility: CLINIC | Age: 65
End: 2020-11-06

## 2020-11-06 ENCOUNTER — AMBULATORY - HEALTHEAST (OUTPATIENT)
Dept: CARDIAC REHAB | Facility: CLINIC | Age: 65
End: 2020-11-06

## 2020-11-06 DIAGNOSIS — Z95.5 STENTED CORONARY ARTERY: ICD-10-CM

## 2020-11-09 ENCOUNTER — AMBULATORY - HEALTHEAST (OUTPATIENT)
Dept: CARDIAC REHAB | Facility: CLINIC | Age: 65
End: 2020-11-09

## 2020-11-09 DIAGNOSIS — Z95.5 STENTED CORONARY ARTERY: ICD-10-CM

## 2020-11-11 ENCOUNTER — HOSPITAL ENCOUNTER (OUTPATIENT)
Dept: MAMMOGRAPHY | Facility: CLINIC | Age: 65
Discharge: HOME | End: 2020-11-11
Attending: FAMILY MEDICINE

## 2020-11-11 DIAGNOSIS — Z12.31 ENCOUNTER FOR SCREENING MAMMOGRAM FOR MALIGNANT NEOPLASM OF BREAST: ICD-10-CM

## 2020-11-13 ENCOUNTER — AMBULATORY - HEALTHEAST (OUTPATIENT)
Dept: CARDIAC REHAB | Facility: CLINIC | Age: 65
End: 2020-11-13

## 2020-11-13 DIAGNOSIS — Z95.5 STENTED CORONARY ARTERY: ICD-10-CM

## 2020-11-16 ENCOUNTER — AMBULATORY - HEALTHEAST (OUTPATIENT)
Dept: CARDIAC REHAB | Facility: CLINIC | Age: 65
End: 2020-11-16

## 2020-11-16 DIAGNOSIS — Z95.5 STENTED CORONARY ARTERY: ICD-10-CM

## 2020-11-20 ENCOUNTER — AMBULATORY - HEALTHEAST (OUTPATIENT)
Dept: CARDIAC REHAB | Facility: CLINIC | Age: 65
End: 2020-11-20

## 2020-11-20 DIAGNOSIS — Z95.5 STENTED CORONARY ARTERY: ICD-10-CM

## 2020-11-20 DIAGNOSIS — I25.10 CORONARY ARTERY DISEASE INVOLVING NATIVE CORONARY ARTERY OF NATIVE HEART, ANGINA PRESENCE UNSPECIFIED: ICD-10-CM

## 2020-11-23 ENCOUNTER — AMBULATORY - HEALTHEAST (OUTPATIENT)
Dept: CARDIAC REHAB | Facility: CLINIC | Age: 65
End: 2020-11-23

## 2020-11-23 DIAGNOSIS — Z95.5 STENTED CORONARY ARTERY: ICD-10-CM

## 2020-11-27 ENCOUNTER — AMBULATORY - HEALTHEAST (OUTPATIENT)
Dept: CARDIAC REHAB | Facility: CLINIC | Age: 65
End: 2020-11-27

## 2020-11-27 DIAGNOSIS — Z95.5 STENTED CORONARY ARTERY: ICD-10-CM

## 2020-12-10 ENCOUNTER — COMMUNICATION - HEALTHEAST (OUTPATIENT)
Dept: CARDIOLOGY | Facility: CLINIC | Age: 65
End: 2020-12-10

## 2020-12-11 ENCOUNTER — OFFICE VISIT - HEALTHEAST (OUTPATIENT)
Dept: CARDIOLOGY | Facility: CLINIC | Age: 65
End: 2020-12-11

## 2020-12-11 DIAGNOSIS — I25.10 CORONARY ARTERY DISEASE INVOLVING NATIVE CORONARY ARTERY OF NATIVE HEART, ANGINA PRESENCE UNSPECIFIED: ICD-10-CM

## 2020-12-11 ASSESSMENT — MIFFLIN-ST. JEOR: SCORE: 1102.88

## 2020-12-14 LAB
ATRIAL RATE - MUSE: 62 BPM
DIASTOLIC BLOOD PRESSURE - MUSE: NORMAL
INTERPRETATION ECG - MUSE: NORMAL
P AXIS - MUSE: 13 DEGREES
PR INTERVAL - MUSE: 142 MS
QRS DURATION - MUSE: 84 MS
QT - MUSE: 416 MS
QTC - MUSE: 422 MS
R AXIS - MUSE: 54 DEGREES
SYSTOLIC BLOOD PRESSURE - MUSE: NORMAL
T AXIS - MUSE: 30 DEGREES
VENTRICULAR RATE- MUSE: 62 BPM

## 2021-01-05 ENCOUNTER — COMMUNICATION - HEALTHEAST (OUTPATIENT)
Dept: CARDIOLOGY | Facility: CLINIC | Age: 66
End: 2021-01-05

## 2021-01-05 ENCOUNTER — COMMUNICATION - HEALTHEAST (OUTPATIENT)
Dept: FAMILY MEDICINE | Facility: CLINIC | Age: 66
End: 2021-01-05

## 2021-01-05 ENCOUNTER — HOSPITAL ENCOUNTER (OUTPATIENT)
Dept: CT IMAGING | Facility: CLINIC | Age: 66
Discharge: HOME | End: 2021-01-05
Attending: INTERNAL MEDICINE

## 2021-01-05 DIAGNOSIS — I25.10 CORONARY ARTERY DISEASE INVOLVING NATIVE CORONARY ARTERY OF NATIVE HEART, ANGINA PRESENCE UNSPECIFIED: ICD-10-CM

## 2021-01-05 LAB
BSA FOR ECHO PROCEDURE: 1.61 M2
CCTA ASCENDING AORTA: 3.2 CM
CREAT BLD-MCNC: 0.8 MG/DL (ref 0.6–1.1)
GFR SERPL CREATININE-BSD FRML MDRD: >60 ML/MIN/1.73M2
LEFT VENTRICLE HEART RATE: 60 BPM

## 2021-01-05 ASSESSMENT — MIFFLIN-ST. JEOR: SCORE: 1075.66

## 2021-01-15 ENCOUNTER — COMMUNICATION - HEALTHEAST (OUTPATIENT)
Dept: FAMILY MEDICINE | Facility: CLINIC | Age: 66
End: 2021-01-15

## 2021-02-01 ENCOUNTER — COMMUNICATION - HEALTHEAST (OUTPATIENT)
Dept: FAMILY MEDICINE | Facility: CLINIC | Age: 66
End: 2021-02-01

## 2021-02-11 ENCOUNTER — AMBULATORY - HEALTHEAST (OUTPATIENT)
Dept: NURSING | Facility: CLINIC | Age: 66
End: 2021-02-11

## 2021-02-16 ENCOUNTER — RECORDS - HEALTHEAST (OUTPATIENT)
Dept: ADMINISTRATIVE | Facility: OTHER | Age: 66
End: 2021-02-16

## 2021-03-19 ENCOUNTER — COMMUNICATION - HEALTHEAST (OUTPATIENT)
Dept: INTERNAL MEDICINE | Facility: CLINIC | Age: 66
End: 2021-03-19

## 2021-03-30 ENCOUNTER — AMBULATORY - HEALTHEAST (OUTPATIENT)
Dept: NURSING | Facility: CLINIC | Age: 66
End: 2021-03-30

## 2021-05-13 ENCOUNTER — OFFICE VISIT - HEALTHEAST (OUTPATIENT)
Dept: CARDIOLOGY | Facility: CLINIC | Age: 66
End: 2021-05-13

## 2021-05-13 DIAGNOSIS — I25.10 CORONARY ARTERY DISEASE INVOLVING NATIVE CORONARY ARTERY OF NATIVE HEART, ANGINA PRESENCE UNSPECIFIED: ICD-10-CM

## 2021-05-24 ENCOUNTER — RECORDS - HEALTHEAST (OUTPATIENT)
Dept: ADMINISTRATIVE | Facility: OTHER | Age: 66
End: 2021-05-24

## 2021-05-24 DIAGNOSIS — I25.10 CORONARY ARTERY DISEASE INVOLVING NATIVE CORONARY ARTERY OF NATIVE HEART, ANGINA PRESENCE UNSPECIFIED: ICD-10-CM

## 2021-05-24 RX ORDER — ROSUVASTATIN CALCIUM 10 MG/1
TABLET, COATED ORAL
Qty: 30 TABLET | Refills: 2 | Status: SHIPPED | OUTPATIENT
Start: 2021-05-24 | End: 2021-10-12

## 2021-05-25 ENCOUNTER — RECORDS - HEALTHEAST (OUTPATIENT)
Dept: ADMINISTRATIVE | Facility: CLINIC | Age: 66
End: 2021-05-25

## 2021-05-26 NOTE — PROGRESS NOTES
ASSESSMENT:  1. Routine general medical examination at a health care facility  Discussed mammogram and vaccines @ 64  - Gynecologic Cytology (PAP Smear)  - Mammo Screening Bilateral; Future  - Cologuard    2. Hyperlipidemia, unspecified hyperlipidemia type     - rosuvastatin (CRESTOR) 5 MG tablet; TAKE 1 TABLET EVERY DAY  Dispense: 90 tablet; Refill: 1  - Comprehensive Metabolic Panel  - Lipid Cascade     3. Thalassemia Anemia              PLAN:  There are no Patient Instructions on file for this visit.    Orders Placed This Encounter   Procedures     Mammo Screening Bilateral     Standing Status:   Future     Standing Expiration Date:   6/22/2020     Order Specific Question:   Is tomosynthesis (3D) requested?     Answer:   Yes     Order Specific Question:   Patient's previous breast density:     Answer:   Scattered fibroglandular density [2]     Order Specific Question:   Can the procedure be changed per Radiologist protocol?     Answer:   Yes     Pneumococcal conjugate vaccine 13-valent 6wks-17yrs; >50yrs     Comprehensive Metabolic Panel     Lipid Cascade     Order Specific Question:   Fasting is required?     Answer:   Yes     Cologuard     Medications Discontinued During This Encounter   Medication Reason     rosuvastatin (CRESTOR) 5 MG tablet Reorder     azithromycin (ZITHROMAX) 500 MG tablet Therapy completed       No Follow-up on file.    Health Maintenance Due   Topic Date Due     PNEUMOCOCCAL POLYSACCHARIDE VACCINE AGE 65 AND OVER  01/01/2019     PNEUMOCOCCAL CONJUGATE VACCINE FOR ADULTS (PCV13 OR PREVNAR)  01/01/2019     FECAL OCCULT BLOOD/FIT ANNUAL COLON CANCER SCREENING  03/16/2019       CHIEF COMPLAINT:  Chief Complaint   Patient presents with     Annual Exam     Immunizations, not on Medicare yet.       HISTORY OF PRESENT ILLNESS:  Cayla Harman is a 64 y.o. female presenting to the clinic today for a physical exam.     Healthy Habits:   Patient reports regular exercise, dental and eye exams. Uses  healthy diet. Always uses seatbelts. Reports uses medications as directed.  Alcohol: 0-1  Smokin  Caffeine use: minimal  Drug use: 0  Birth control: menopause    REVIEW OF SYSTEMS:   All other systems are negative.    Immunization History   Administered Date(s) Administered     DT (pediatric) 2000     Hep A, historic 2000     Hep B, historic 2000     IPV 2013     Influenza L5h8-38, 2009     Influenza, inj, historic,unspecified 2008, 2012, 10/01/2015, 2016, 10/01/2017, 2018     Influenza,seasonal, Inj IIV3 2008, 2012     MMR 2013     Pneumo Conj 13-V (2010&after) 2019     Td,adult,historic,unspecified 2008     Tdap 2008, 2018     Typhoid Live, Oral 2013, 2018     Typhoid, Inj, Inactive 10/05/2018     Yellow Fever 2018     ZOSTER, LIVE 2015     ZOSTER, RECOMBINANT, IM 2018, 2018       GYNECOLOGIC HISTORY:  Last menstrual period: years ago   Contraception: menopause  Last Pap: 2016 Results were: normal  Last mammogram: last year  Results were: normal    OB History      Para Term  AB Living    4 2 2   2 2    SAB TAB Ectopic Multiple Live Births      2                PFSH:     Social History     Tobacco Use   Smoking Status Never Smoker   Smokeless Tobacco Never Used     Family History   Problem Relation Age of Onset     COPD Mother      No Medical Problems Daughter      No Medical Problems Maternal Grandmother      No Medical Problems Maternal Grandfather      No Medical Problems Paternal Grandmother      No Medical Problems Paternal Grandfather      No Medical Problems Maternal Aunt      No Medical Problems Paternal Aunt      BRCA 1/2 Neg Hx      Breast cancer Neg Hx      Cancer Neg Hx      Colon cancer Neg Hx      Endometrial cancer Neg Hx      Ovarian cancer Neg Hx      Social History     Socioeconomic History     Marital status:      Spouse name: None      Number of children: None     Years of education: None     Highest education level: None   Occupational History     Occupation:    Social Needs     Financial resource strain: None     Food insecurity:     Worry: None     Inability: None     Transportation needs:     Medical: None     Non-medical: None   Tobacco Use     Smoking status: Never Smoker     Smokeless tobacco: Never Used   Substance and Sexual Activity     Alcohol use: Yes     Comment: occasional     Drug use: No     Sexual activity: Yes     Partners: Male     Birth control/protection: Post-menopausal   Lifestyle     Physical activity:     Days per week: None     Minutes per session: None     Stress: None   Relationships     Social connections:     Talks on phone: None     Gets together: None     Attends Hinduism service: None     Active member of club or organization: None     Attends meetings of clubs or organizations: None     Relationship status: None     Intimate partner violence:     Fear of current or ex partner: None     Emotionally abused: None     Physically abused: None     Forced sexual activity: None   Other Topics Concern     None   Social History Narrative     None     Past Surgical History:   Procedure Laterality Date     LA ESOPHAGOGASTRODUODENOSCOPY TRANSORAL DIAGNOSTIC      Description: Esophagogastroduodenoscopy;  Recorded: 2008;  Comments: ; no sprue; no H.pylori     LA INDUCED ABORTN BY D&C      Description: Surgically Induced ;  Recorded: 2008;     LA SURG RX MISSED ABORTN,1ST TRI      Description: Surgical Treatment Of Missed ;  Recorded: 2008;     No Known Allergies  Active Ambulatory Problems     Diagnosis Date Noted     Hyperlipidemia      Thalassemia Anemia      Drug Allergy      Back Pain      Reactions To Sulfa Drugs      Osteoporosis 2016     Resolved Ambulatory Problems     Diagnosis Date Noted     Vitamin D deficiency      Past Medical History:   Diagnosis Date      "Hyperlipidemia       (normal spontaneous vaginal delivery)      Thalassemia        VITALS:  Vitals:    19 0920   BP: 122/76   Patient Site: Right Arm   Patient Position: Sitting   Cuff Size: Adult Regular   Pulse: 61   SpO2: 99%   Weight: 138 lb 3.2 oz (62.7 kg)   Height: 4' 11.5\" (1.511 m)     BP Readings from Last 3 Encounters:   19 122/76   10/05/18 112/62   18 100/64     Wt Readings from Last 3 Encounters:   19 138 lb 3.2 oz (62.7 kg)   10/05/18 137 lb 12.8 oz (62.5 kg)   18 137 lb 14.4 oz (62.6 kg)     Body mass index is 27.45 kg/m .    PHYSICAL EXAM:  General Appearance: Alert, cooperative, no distress, appears stated age  Head: Normocephalic, without obvious abnormality, atraumatic  Eyes: PERRL, conjunctiva/corneas clear, EOM's intact  Ears: Normal TM's and external ear canals, both ears  Nose: Nares normal, septum midline,mucosa normal, no drainage  Throat: Lips, mucosa, and tongue normal; teeth and gums normal  Neck: Supple, symmetrical, trachea midline, no adenopathy;  thyroid: not enlarged, symmetric, no tenderness/mass/nodules;   Back: Symmetric, no curvature, ROM normal, no CVA tenderness  Lungs: Clear to auscultation bilaterally, respirations unlabored  Breasts: No breast masses, tenderness, asymmetry, or nipple discharge.  Heart: Regular rate and rhythm, S1 and S2 normal, no murmur, rub, or gallop, Abdomen: Soft, non-tender, bowel sounds active all four quadrants,  no masses, no organomegaly  Pelvic:Normally developed genitalia with no external lesions or eruptions. Vagina and cervix show no lesions, inflammation, discharge or tenderness. No cystocele, No rectocele. Uterus nontender.  No adnexal mass or tenderness.    Extremities: Extremities normal, atraumatic, no cyanosis or edema  Skin: Skin color, texture, turgor normal, no rashes or lesions  Lymph nodes: Cervical, supraclavicular, and axillary nodes normal  Neurologic: Normal      MEDICATIONS:  Current Outpatient " Medications   Medication Sig Dispense Refill     calcium, as carbonate, (OS-DAMIR) 500 mg calcium (1,250 mg) tablet Take 1 tablet by mouth daily.       CHOLECALCIFEROL, VITAMIN D3, (VITAMIN D3 ORAL) Take 5,000 Units by mouth daily. TABS       denosumab 60 mg/mL Syrg Inject 60 mg under the skin every 6 (six) months.       ferrous sulfate 325 (65 FE) MG tablet Take 1 tablet by mouth daily. As directed       rosuvastatin (CRESTOR) 5 MG tablet TAKE 1 TABLET EVERY DAY 90 tablet 1     Current Facility-Administered Medications   Medication Dose Route Frequency Provider Last Rate Last Dose     denosumab 60 mg (PROLIA 60 mg/ml)  60 mg Subcutaneous Q6 Months Marylin Simpson MD   60 mg at 02/08/19 0146

## 2021-05-27 VITALS
RESPIRATION RATE: 18 BRPM | OXYGEN SATURATION: 98 % | HEART RATE: 68 BPM | SYSTOLIC BLOOD PRESSURE: 102 MMHG | DIASTOLIC BLOOD PRESSURE: 50 MMHG | WEIGHT: 137 LBS

## 2021-05-27 NOTE — TELEPHONE ENCOUNTER
Who is calling:  Patient  Reason for Call:  Patient is returning call. Pt would like a phone call back, if possible, between 3:30 pm-5:00 pm today or tomorrow between 8:00am - 10:00 am tomorrow.  Date of last appointment with primary care: 3/22/2019  Okay to leave a detailed message: No

## 2021-05-27 NOTE — TELEPHONE ENCOUNTER
Left a non-detailed message on the voicemail that I was calling to explain some test results.  When we explained the Pap smear so I can discuss our next steps.    Breanna Garcia MD

## 2021-05-28 ENCOUNTER — RECORDS - HEALTHEAST (OUTPATIENT)
Dept: ADMINISTRATIVE | Facility: CLINIC | Age: 66
End: 2021-05-28

## 2021-05-28 NOTE — TELEPHONE ENCOUNTER
Test Results  Who is calling?:  Patient   Who ordered the test:  PCP  Type of test: Lab - Cologuard   Date of test:  04/04/19  Where was the test performed: [facility]   What are your questions/concerns?: Patient would like results ASAP .  Results in Lab tab.  Okay to leave a detailed message?:  Yes

## 2021-05-30 ENCOUNTER — RECORDS - HEALTHEAST (OUTPATIENT)
Dept: ADMINISTRATIVE | Facility: CLINIC | Age: 66
End: 2021-05-30

## 2021-05-30 VITALS — HEIGHT: 59 IN | BODY MASS INDEX: 27.6 KG/M2 | WEIGHT: 136.9 LBS

## 2021-05-31 VITALS — BODY MASS INDEX: 26.94 KG/M2 | WEIGHT: 133.4 LBS

## 2021-05-31 VITALS — WEIGHT: 131.7 LBS | BODY MASS INDEX: 26.55 KG/M2 | HEIGHT: 59 IN
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
## 2021-05-31 NOTE — PATIENT INSTRUCTIONS - HE
Prolia 9th today.  Prolia 10th in 6 months with my nurse. I will see you in 1 year.    DXA in 6/2021 .   Phone number to schedule [phone].    Daily calcium need is 1139-6102 mg a day from the diet and supplements.  Calcium citrate is easier to digest.  Vitamin D 2000 IU daily recommended.

## 2021-06-01 VITALS — WEIGHT: 137.9 LBS | BODY MASS INDEX: 27.85 KG/M2

## 2021-06-01 VITALS — HEIGHT: 59 IN | BODY MASS INDEX: 27.8 KG/M2 | WEIGHT: 137.9 LBS

## 2021-06-02 VITALS — WEIGHT: 137.8 LBS | BODY MASS INDEX: 27.83 KG/M2

## 2021-06-02 VITALS — WEIGHT: 138.2 LBS | HEIGHT: 60 IN | BODY MASS INDEX: 27.13 KG/M2
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
## 2021-06-02 NOTE — PATIENT INSTRUCTIONS - HE
1. Begin using post op shoe to while walking.   2. Take Ibuprofen as needed for pain control.   3. Follow up with ortho for further evaluation and treatment of this fracture.

## 2021-06-03 VITALS
HEART RATE: 69 BPM | TEMPERATURE: 97.6 F | WEIGHT: 140 LBS | RESPIRATION RATE: 16 BRPM | BODY MASS INDEX: 27.8 KG/M2 | SYSTOLIC BLOOD PRESSURE: 119 MMHG | DIASTOLIC BLOOD PRESSURE: 71 MMHG | OXYGEN SATURATION: 98 %

## 2021-06-03 VITALS — WEIGHT: 141.6 LBS | BODY MASS INDEX: 28.12 KG/M2

## 2021-06-04 VITALS
HEART RATE: 78 BPM | DIASTOLIC BLOOD PRESSURE: 64 MMHG | HEIGHT: 59 IN | BODY MASS INDEX: 28.16 KG/M2 | WEIGHT: 139.7 LBS | SYSTOLIC BLOOD PRESSURE: 122 MMHG

## 2021-06-04 VITALS
RESPIRATION RATE: 16 BRPM | DIASTOLIC BLOOD PRESSURE: 82 MMHG | BODY MASS INDEX: 28.07 KG/M2 | HEIGHT: 60 IN | WEIGHT: 143 LBS | SYSTOLIC BLOOD PRESSURE: 148 MMHG | HEART RATE: 64 BPM

## 2021-06-04 VITALS
SYSTOLIC BLOOD PRESSURE: 114 MMHG | OXYGEN SATURATION: 99 % | WEIGHT: 140.4 LBS | DIASTOLIC BLOOD PRESSURE: 63 MMHG | BODY MASS INDEX: 27.42 KG/M2 | HEART RATE: 59 BPM

## 2021-06-04 VITALS — HEIGHT: 60 IN | BODY MASS INDEX: 27.54 KG/M2 | WEIGHT: 140.25 LBS

## 2021-06-04 VITALS — BODY MASS INDEX: 26.76 KG/M2 | WEIGHT: 137 LBS

## 2021-06-04 VITALS — WEIGHT: 139 LBS | BODY MASS INDEX: 27.15 KG/M2

## 2021-06-04 VITALS — WEIGHT: 138 LBS | BODY MASS INDEX: 26.95 KG/M2

## 2021-06-04 VITALS
DIASTOLIC BLOOD PRESSURE: 62 MMHG | HEIGHT: 60 IN | HEART RATE: 72 BPM | SYSTOLIC BLOOD PRESSURE: 112 MMHG | BODY MASS INDEX: 27.51 KG/M2 | RESPIRATION RATE: 16 BRPM | WEIGHT: 140.1 LBS

## 2021-06-04 VITALS — WEIGHT: 137 LBS | BODY MASS INDEX: 26.76 KG/M2

## 2021-06-04 VITALS
SYSTOLIC BLOOD PRESSURE: 120 MMHG | BODY MASS INDEX: 27.29 KG/M2 | WEIGHT: 139 LBS | DIASTOLIC BLOOD PRESSURE: 62 MMHG | RESPIRATION RATE: 16 BRPM | HEIGHT: 60 IN | HEART RATE: 64 BPM

## 2021-06-04 VITALS — WEIGHT: 136 LBS | BODY MASS INDEX: 26.56 KG/M2

## 2021-06-04 VITALS — BODY MASS INDEX: 26.56 KG/M2 | WEIGHT: 136 LBS

## 2021-06-04 VITALS — WEIGHT: 137 LBS | BODY MASS INDEX: 26.76 KG/M2 | BODY MASS INDEX: 26.76 KG/M2 | WEIGHT: 137 LBS

## 2021-06-04 VITALS — BODY MASS INDEX: 27.34 KG/M2 | WEIGHT: 140 LBS

## 2021-06-04 VITALS — WEIGHT: 139.8 LBS | BODY MASS INDEX: 27.3 KG/M2

## 2021-06-04 VITALS — BODY MASS INDEX: 26.95 KG/M2 | WEIGHT: 138 LBS

## 2021-06-05 ENCOUNTER — HEALTH MAINTENANCE LETTER (OUTPATIENT)
Age: 66
End: 2021-06-05

## 2021-06-05 VITALS — BODY MASS INDEX: 25.47 KG/M2 | WEIGHT: 134.8 LBS

## 2021-06-05 VITALS — WEIGHT: 136 LBS | BODY MASS INDEX: 25.7 KG/M2

## 2021-06-05 VITALS — WEIGHT: 133 LBS | BODY MASS INDEX: 25.13 KG/M2

## 2021-06-05 VITALS
BODY MASS INDEX: 26.06 KG/M2 | SYSTOLIC BLOOD PRESSURE: 120 MMHG | RESPIRATION RATE: 12 BRPM | WEIGHT: 138 LBS | DIASTOLIC BLOOD PRESSURE: 78 MMHG | HEART RATE: 68 BPM | HEIGHT: 61 IN

## 2021-06-05 VITALS — BODY MASS INDEX: 25.13 KG/M2 | WEIGHT: 133 LBS

## 2021-06-05 VITALS — BODY MASS INDEX: 26.56 KG/M2 | WEIGHT: 136 LBS

## 2021-06-05 VITALS — BODY MASS INDEX: 25.7 KG/M2 | WEIGHT: 136 LBS

## 2021-06-05 VITALS
SYSTOLIC BLOOD PRESSURE: 130 MMHG | WEIGHT: 139 LBS | HEIGHT: 61 IN | OXYGEN SATURATION: 98 % | DIASTOLIC BLOOD PRESSURE: 60 MMHG | BODY MASS INDEX: 26.24 KG/M2 | RESPIRATION RATE: 16 BRPM | HEART RATE: 67 BPM

## 2021-06-05 VITALS — WEIGHT: 135.5 LBS | BODY MASS INDEX: 25.6 KG/M2

## 2021-06-05 VITALS — BODY MASS INDEX: 25.61 KG/M2 | WEIGHT: 135.52 LBS

## 2021-06-05 VITALS — HEIGHT: 61 IN | WEIGHT: 133 LBS | BODY MASS INDEX: 25.11 KG/M2

## 2021-06-05 VITALS — BODY MASS INDEX: 25.79 KG/M2 | WEIGHT: 136.5 LBS

## 2021-06-05 VITALS — BODY MASS INDEX: 25.32 KG/M2 | WEIGHT: 134 LBS

## 2021-06-05 VITALS — WEIGHT: 134 LBS | BODY MASS INDEX: 25.32 KG/M2

## 2021-06-05 VITALS — BODY MASS INDEX: 25.89 KG/M2 | WEIGHT: 137 LBS

## 2021-06-05 NOTE — TELEPHONE ENCOUNTER
I signed new order connected to M81[employer]8 Dg. If that does not work, please look up her pharmacy benefits. Thanks

## 2021-06-06 NOTE — PROGRESS NOTES
1. Did you experience any problems with previous Prolia injection? No  2. Do you feel sick today?(fever, RS, GI,  issues)? No  3. Any medication changes in the last 6 months? No  4. Did you take prednisone or other immunosuppressant drugs in the last 6 months?(chemo, transplant, rheu, dermatology conditions)? No  5. Did you have any serious infection in the last 6 months? (pancreatitis) No  6. Any recent hospitalizations/ surgeries (especially gastric bypass, thyroid, parathyroid)? No  7. Do you plan any dental work?(especially implants and extractions) in the next 2-3 months? No  8. Did you have any fractures in the last year? Yes-Toe fx from fall

## 2021-06-08 NOTE — PATIENT INSTRUCTIONS - HE
Cayla Harman,    It was a pleasure to see you today at the [facility].     My recommendations after this visit include:    CT of the heart  Start checking your blood pressure at home and call us if readings are consistently higher than 130/85    W. Say Morris MD, FACC, [facility]

## 2021-06-08 NOTE — TELEPHONE ENCOUNTER
Lm to complete wellness screen. -[facility] [place]    Wellness Screening Tool  Symptom Screening:  Do you have one of the following NEW symptoms:    Fever (subjective or >100.0)?  No    A new cough?  No    Shortness of breath?  No     Chills? No     New loss of taste or smell? No     Generalized body aches? No     New persistent headache? No     New sore throat? No     Nausea, vomiting, or diarrhea?  No    Within the past 3 weeks, have you been exposed to someone with a known positive illness below:    COVID-19 (known or suspected)?  No    Chicken pox?  No    Mealses?  No    Pertussis?  No    Patient notified of visitor restrictions: Yes  Pt informed to wear a mask: Yes  Pt notified if they develop any symptoms listed above, prior to their appointment, they are to call the clinic directly at [phone] for further instructions.  Yes  Patient's appointment status: Patient will be seen in clinic as scheduled on 6/12 1450 with KOSTAS

## 2021-06-08 NOTE — PROGRESS NOTES
ASSESSMENT:  1. Hyperlipidemia     - rosuvastatin (CRESTOR) 5 MG tablet; TAKE 1 TABLET DAILY  Dispense: 90 tablet; Refill: 3  - Lipid Cascade; Future  - Comprehensive Metabolic Panel; Future    2. Thalassemia Anemia  This is a long-standing problem and she wonders why don't to CBCs every year and I told her that is not a routine part of healthcare maintenance.  She is not having problems with bleeding or symptoms that indicate that she is becoming more anemic, we know what to find because we know she can have a low hemoglobin because of her thalassemia.  She is requesting that that lab be done today    - HM2(CBC w/o Differential); Future    3. Vitamin D deficiency     - Vitamin D, Total (25-Hydroxy); Future    4. Routine general medical examination at a health care facility  We discussed mammography and she follows up with her bone density with Dr. Simpson and the last time she had appropriate shots she had a very sore arm and she still has a sore arm 3 months after the fact.  We discussed routine vaccines.  She is not willing to do a colonoscopy but she is willing to do Hemoccult cards.    - Comprehensive Metabolic Panel; Future  - Occult Blood(ICT); Future    5. Osteoporosis  Follows with Dr. Simpson  - Vitamin D, Total (25-Hydroxy); Future      PLAN:  Negative appointment to come back fasting for the lab draw that she is requesting    Orders Placed This Encounter   Procedures     Occult Blood(ICT)     Standing Status:   Future     Standing Expiration Date:   2/16/2018     Lipid Cascade     Standing Status:   Future     Standing Expiration Date:   2/16/2018     Order Specific Question:   Fasting is required?     Answer:   Yes     Comprehensive Metabolic Panel     Standing Status:   Future     Standing Expiration Date:   2/16/2018     HM2(CBC w/o Differential)     Standing Status:   Future     Standing Expiration Date:   2/16/2018     Vitamin D, Total (25-Hydroxy)     Standing Status:   Future     Standing  Expiration Date:   2/16/2018     Medications Discontinued During This Encounter   Medication Reason     rosuvastatin (CRESTOR) 5 MG tablet Reorder           Health Maintenance Due   Topic Date Due     COLONOSCOPY  07/12/2014     ADVANCE DIRECTIVES DISCUSSED WITH PATIENT  05/09/2016     INFLUENZA VACCINE RULE BASED (1) 08/01/2016       CHIEF COMPLAINT:  Chief Complaint   Patient presents with     Annual Exam     No concerns-pt is NOT fasting       HISTORY OF PRESENT ILLNESS:  Cayla Harman is a 62 y.o. female presenting to the clinic today for a physical exam.  She is on generic Crestor for hyperlipidemia.  She has a thalassemia anemia and wonders why she doesn't have her CBC tested as routine health maintenance and I told her that would not be standard prevention since we know she is to be anemic and as long she is not having symptoms, that would be part of my routine but she is asking that that be done when we do labs today.    She refuses to do a colonoscopy but is willing to do Hemoccult cards and if those come back positive, she then will be willing to do a colonoscopy.    Healthy Habits:   Patient reports regular exercise, dental and eye exams. Uses healthy diet. Always uses seatbelts. Reports uses medications as directed.  Alcohol: 0-1/wk  Smoking: Never  Caffeine use: 4-5 cups a tea daily  Drug use: No  Birth control: None    REVIEW OF SYSTEMS:   All other systems are negative.    Immunization History   Administered Date(s) Administered     DT (pediatric) 01/01/2000     Hep A, historic 01/01/2000     Hep B, historic 01/01/2000     IPV 01/16/2013     Influenza, inj, historic 12/01/2008, 09/01/2012, 10/01/2015     MMR 01/16/2013     Td, historic 11/05/2008     Tdap 11/05/2008     Typhoid Live 01/16/2013     ZOSTER 11/30/2015       GYNECOLOGIC HISTORY:  Last menstrual period: menopause  Contraception: None  Last Pap:  07/26/2016 Results were: normal  Last mammogram: 12/14/2015  Results were: normal    OB History       Para Term  AB TAB SAB Ectopic Multiple Living    4 2 2  2 2    2          PFSH:     History   Smoking Status     Never Smoker   Smokeless Tobacco     Never Used     Family History   Problem Relation Age of Onset     No Medical Problems Daughter      No Medical Problems Maternal Grandmother      No Medical Problems Maternal Grandfather      No Medical Problems Paternal Grandmother      No Medical Problems Paternal Grandfather      No Medical Problems Maternal Aunt      No Medical Problems Paternal Aunt      BRCA 1/2 Neg Hx      Breast cancer Neg Hx      Cancer Neg Hx      Colon cancer Neg Hx      Endometrial cancer Neg Hx      Ovarian cancer Neg Hx      Social History     Social History     Marital status:      Spouse name: N/A     Number of children: N/A     Years of education: N/A     Occupational History           Social History Main Topics     Smoking status: Never Smoker     Smokeless tobacco: Never Used     Alcohol use Yes      Comment: 1-2/week     Drug use: No     Sexual activity: Yes     Birth control/ protection: Post-menopausal     Other Topics Concern     None     Social History Narrative     Past Surgical History:   Procedure Laterality Date     HI ESOPHAGOGASTRODUODENOSCOPY TRANSORAL DIAGNOSTIC      Description: Esophagogastroduodenoscopy;  Recorded: 2008;  Comments: ; no sprue; no H.pylori     HI INDUCED ABORTN BY D&C      Description: Surgically Induced ;  Recorded: 2008;     HI SURG RX MISSED ABORTN,1ST TRI      Description: Surgical Treatment Of Missed ;  Recorded: 2008;     No Known Allergies  Active Ambulatory Problems     Diagnosis Date Noted     Hyperlipidemia      Thalassemia Anemia      Vitamin D Deficiency      Drug Allergy      Back Pain      Reactions To Sulfa Drugs      Osteoporosis 2016     Resolved Ambulatory Problems     Diagnosis Date Noted     No Resolved Ambulatory Problems     Past Medical History:  "  Diagnosis Date     Hyperlipidemia        VITALS:  Vitals:    02/16/17 1347   BP: 110/66   Pulse: 66   SpO2: 98%   Weight: 136 lb 14.4 oz (62.1 kg)   Height: 4' 11\" (1.499 m)     BP Readings from Last 3 Encounters:   02/16/17 110/66   12/02/16 130/68   07/26/16 124/72     Wt Readings from Last 3 Encounters:   02/16/17 136 lb 14.4 oz (62.1 kg)   12/02/16 138 lb 14.4 oz (63 kg)   07/26/16 137 lb (62.1 kg)     Body mass index is 27.65 kg/(m^2).    PHYSICAL EXAM:  General Appearance: Alert, cooperative, no distress, appears stated age  Head: Normocephalic, without obvious abnormality, atraumatic  Eyes: PERRL, conjunctiva/corneas clear, EOM's intact, glasses  Ears: Normal TM's and external ear canals, both ears  Nose: Nares normal, septum midline,mucosa normal, no drainage  Throat: Lips, mucosa, and tongue normal; teeth and gums normal  Neck: Supple, symmetrical, trachea midline, no adenopathy;  thyroid: not enlarged, symmetric, no tenderness/mass/nodules; no carotid bruit or JVD  Back: Symmetric, no curvature, ROM normal, no CVA tenderness  Lungs: Clear to auscultation bilaterally, respirations unlabored  Breasts: No breast masses, tenderness, asymmetry, or nipple discharge.  Heart: Regular rate and rhythm, S1 and S2 normal, no murmur, rub, or gallop, Abdomen: Soft, non-tender, bowel sounds active all four quadrants,  no masses, no organomegaly  Pelvic:Not examined  Extremities: Extremities normal, atraumatic, no cyanosis or edema  Skin: Skin color, texture, turgor normal, no rashes or lesions  Lymph nodes: Cervical, supraclavicular, and axillary nodes normal  Neurologic: Normal       QUALITY MEASURES:  I have had an Advance Directives discussion with the patient.      MEDICATIONS:  Current Outpatient Prescriptions   Medication Sig Dispense Refill     calcium, as carbonate, (OS-DAMIR) 500 mg calcium (1,250 mg) tablet Take 1 tablet by mouth daily.       CHOLECALCIFEROL, VITAMIN D3, (VITAMIN D3 ORAL) Take 5,000 Units by " mouth daily. TABS       ferrous sulfate 325 (65 FE) MG tablet Take 1 tablet by mouth daily. As directed       rosuvastatin (CRESTOR) 5 MG tablet TAKE 1 TABLET DAILY 90 tablet 3     terbinafine HCl (LAMISIL) 250 mg tablet 1 TABLET DAILY BY MOUTH  1     No current facility-administered medications for this visit.

## 2021-06-09 NOTE — TELEPHONE ENCOUNTER
----- Message from Mariana Matta sent at 7/6/2020  2:59 PM CDT -----  Regarding: NILES ORDERING  CORS POSS PCI WITH AHMED ONLY PER PT REQUEST    7/20/2020 9:30 AM ADMIT     H&P: 7/14 IN CLINIC WITH [facility]     NO ALERTS     Thanks,   Emely

## 2021-06-09 NOTE — TELEPHONE ENCOUNTER
----- Message from Davey Morris MD (Ted) sent at 7/1/2020  8:02 AM CDT -----  Dr. Dominguez thinks that she may have significant LAD stenosis.  Should undergo coronary angiogram and possible intervention to clarify the question.        Received a call back from patient and went over test results in detail. Pt requested a result letter and wants to go over it and do her own research at home first before making a decision. She also would like to have a virtual or in-person visit with Dr. Morris prior to cor poss to discuss. She will call back next week to arrange. Result letter mailed home for her review with impression. -[facility] [place]
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
## 2021-06-09 NOTE — PATIENT INSTRUCTIONS - HE
Cayla Harman,    It was a pleasure to see you today at the [facility].     My recommendations after this visit include:    Coronary angiogram and possible intervention    HERMES Morirs MD, FACC, ARON

## 2021-06-09 NOTE — PATIENT INSTRUCTIONS - HE
Advance Directive  Patient s advance directive was discussed and I am comfortable with the patient s wishes.  Patient Education   Personalized Prevention Plan  You are due for the preventive services outlined below.  Your care team is available to assist you in scheduling these services.  If you have already completed any of these items, please share that information with your care team to update in your medical record.  Health Maintenance   Topic Date Due     HEPATITIS C SCREENING  1955     MEDICARE ANNUAL WELLNESS VISIT  03/22/2020     FALL RISK ASSESSMENT  03/22/2020     PNEUMOCOCCAL IMMUNIZATION 65+ LOW/MEDIUM RISK (2 of 2 - PPSV23) 03/22/2020     INFLUENZA VACCINE RULE BASED (1) 08/01/2020     MAMMOGRAM  04/11/2021     DXA SCAN  06/11/2021     COLORECTAL CANCER SCREENING  04/04/2022     ADVANCE CARE PLANNING  03/02/2023     LIPID  03/22/2024     TD 18+ HE  09/20/2028     ZOSTER VACCINES  Completed     HEPATITIS B VACCINES  Aged Out

## 2021-06-09 NOTE — TELEPHONE ENCOUNTER
Wellness Screening Tool  Symptom Screening:  Do you have one of the following NEW symptoms:    Fever (subjective or >100.0)?  No    A new cough?  No    Shortness of breath?  No     Chills? No     New loss of taste or smell? No     Generalized body aches? No     New persistent headache? No     New sore throat? No     Nausea, vomiting, or diarrhea?  No    Within the past 3 weeks, have you been exposed to someone with a known positive illness below:    COVID-19 (known or suspected)?  No    Chicken pox?  No    Mealses?  No    Pertussis?  No    Patient notified of visitor policy- They may have one person accompany them to their appointment, but they will need to wear a mask and will be screened upon arrival for symptoms: Yes  Pt informed to wear a mask: Yes  Pt notified if they develop any symptoms listed above, prior to their appointment, they are to call the clinic directly at [phone] for further instructions.  Yes  Patient's appointment status: Patient will be seen in clinic as scheduled on tomorrow with KOSTAS
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
## 2021-06-10 NOTE — PATIENT INSTRUCTIONS - HE
Prolia 11th today.  Prolia 12th in 6 months with my nurse,. I will see you in 1 year.    DXA in 6/2021 .   Phone number to schedule [phone].    Daily calcium need is 3258-9514 mg a day from the diet and supplements. You can continue taking 1/2 pill calcium and eat one yogurt a day, 2 cups of milk minimum.  Vitamin D 2000 IU daily recommended.

## 2021-06-10 NOTE — PROGRESS NOTES
ITP ASSESSMENT   Assessment Day: Initial    Session Number: 1/2  Precautions: Standard cardiac sx, B knee, L shoulder    Diagnosis: Stent    Risk Stratification: Medium    Referring Provider: Silvio Cuellar MD   ITP: Dr. Espinal  EXERCISE  Exercise Assessment: Initial       6 Minute Walk Test   Pre   Pre Exercise HR: 61                    Pre Exercise BP: 126/72      Peak  Peak HR: 87                   Peak BP: 158/76    Peak feet: 1400    Peak O2 SAT: 99    Peak RPE: 4-5    Peak MPH: 2.65      Symptoms:  Peak Symptoms: denies any pain but felt 2 episodes where she needed to take a deep breath      5 mins. Post  5 Min Post HR: 61    5 Min Post BP: 126/78                           Exercise Plan  Goals Next 30 days   Goal #1: Pt to resume grocery shopping while pushing a cart for 20-30 minutes without any cv sx/sx      Goal #2: Pt to resume sweeping the kitchen floor for ~10 minutes without any cv sx/sx     LTG: Pt to resume hiking for 30-60 minutes without any cv sx/sx. Mock a 5-6 MET level. Pt also to get to 130lbs as a LTG of weight loss.         Education Goals: All goals in this section met    Education Goals Met: Medication review.;Has system for taking medication.;Patient can state cardiac s/s and appropriate emergency response.      Exercise Prescription  Exercise Mode: Treadmill;Bike;Nustep;Arm Erg.;Stairs;Hallway Walking (Caution L shoulder )    Frequency: 2x/week    Duration: 30-40 minutes    Intensity / THR: 20-30 beats above resting heart rate    RPE 11-14  Progression / Met level: 3.1-3.8+    Resistive Training?: Yes (Caution L shoulder)      Current Exercise (mins/week): 165      Interventions  Home Exercise:  Mode: Walking    Frequency: 2-3x/week    Duration: 30-60 minutes      Education Material : Educational videos;Individual education and counseling;Provide written material;Offer educational classes (PH II Folder reviewed and given to patient)      Education Completed  Exercise Education  Completed: Cardiac Anatomy;Signs and Symptoms;Medication review;RPE;Emergency Plan;Home Exercise;Warm up/cool down;FITT Principles;BP/HR Reponse to exercise;Stretching;Strength training;Benefits of Exercise;End point of exercise              Exercise Follow-up/Discharge  Follow up/Discharge: Skilled therapy needed to monitor pt for any arrhythmias s/p stent x 2 placement. Also, skilled therapy needed to monitor bp and appropriate cv response to exercise. Pt mildly hypertensive today. Skilled therapy needed to safely guide pt to a 5-6 MET level to resume hiking, carrying 1-15lbs upstairs and to increase her endurance and stamina. Continue to educate pt on CAD. Pt is an exerciser and is motivated to participate to increase her endurance and stamina along with lose weight.    NUTRITION  Nutrition Assessment: Initial      Nutrition Risk Factors:  Nutrition Risk Factors: Dyslipidemia  Cholesterol: 133 (7/20/20)  LDL: 75  HDL: 34  Triglycerides: 118      Nutrition Plan  Interventions  Diet Consult: NA (Initial session but pt is interested in meeting with dietici)    Other Nutrition Intervention: Therapist/Pt Discussion;Provide with Written Material      Education Completed  Nutrition Education Completed: Low Saturated fat diet;Risk factor overview;Low sodium diet;Weight management      Goals  Nutrition Goals (Next 30 days): Patient can identify their risk factors for CAD;Patient will lose weight;Improve Rate Your Plate Survey Score      Goals Met  Nutrition Goals Met: Patient follows a low sodium diet;Completed Nutritional Risk Screen;Provided Rate your Plate Survey;Reviewed Dietitian schedule;Patient states following a low saturated fat diet;Patient knows appropriate portion size      Height, Weight, and  BMI  Weight: 139 lb 12.8 oz (63.4 kg)  Height: 5' (1.524 m)  BMI: 27.3      Nutrition Follow-up  Follow-up/Discharge: Pt is interested in meeting with the dietician. Pt does not eat meat and her main source of protein  comes from lentiles and whey protein that she puts into shakes. Pt diligently reads labels and does not add sodium. Pt weighs herself daily and also checks her bp. Pt is compliant with statin medication. Pt drinks skim milk and water and has eliminated fried foods. Pt is eating more vegetables and fruit along with oatmeal. Pt does not drink pop. Pt wants to lose weight to get down to 130lbs.          Other Risk Factors  Other Risk Factor Assessment: Initial      HTN Risk Factor: NA      Pre Exercise BP: 126/72  Post Exercise BP: 126/78          Tobacco Risk Factor: NA          Risk Factor Follow-up   Follow-up/Discharge: Pt not on any bp meds and did run mildly hypertensive today but she is checking her bp at home daily and it has been consistenly <120/80. Pt is following low sodium diet also.      PSYCHOSOCIAL  Psychosocial Assessment: Initial       [facility]      PHQ-9 Total Score: 5      Psychosocial Risk Factor: NA      Psychosocial Plan  Interventions    Interventions: Offer educational videos and classes;Provide written material;Individual education and counseling       Education Completed  Education Completed: Relaxation/Coping Techniques;Effects of stress on body;S/S of depression      Goals  Goals (Next 30 days): Improvement in [employer] COOP score      Goals Met  Goals Met: Identified Support system;Oriented to stress management classes;Identify stressors;Practicing stress management skills      Psychosocial Follow-up  Follow-up/Discharge: Pt denies stress and feels she is coping well with her diagnosis. Pt utilizes writing down her thoughs and exercise when stressful events due arise. Pt is motivated to participate in cardiac rehab. Pt enjoys spending time with family and her grandson.              Patient involved in Goal setting?: Yes

## 2021-06-10 NOTE — PROGRESS NOTES
Cayla Harman has participated in 4 sessions of Phase II Cardiac Rehab.    Progress Report:   Cardiac Rehab Treatment Progress Report 7/17/2020 7/20/2020 7/27/2020 7/31/2020 8/3/2020   Weight 139 lbs 11 oz 140 lbs 4 oz 139 lbs 13 oz 140 lbs 139 lbs   Pre Exercise  HR - - 61 63 65   Pre Exercise BP - - 126/72 118/70 110/62   Treadmill Peak HR - - - 97 88   Treadmill Peak Blood Pressure - - - 130/68 -   Nustep Peak Heart Rate - - - 94 75   Nustep Peak Blood Pressure - - - - 130/72   Heart Rate - - 61 65 69   Post Exercise BP - - 126/78 112/70 124/76   ECG - - SR SR SR   Total Exercise Minutes - - 6 35 45         Current Status:  Therapists Comments: Pt has just started cardiac rehab.  She is progressing well and is denying sx with ex.  She reports she is walking regularly at home.  Will attempt to progress pt to goal of 6 METs.    If Physician recommends change in treatment plan, please place orders.        __________________________________________________      _____________  Signature                                                                                                  Date

## 2021-06-10 NOTE — TELEPHONE ENCOUNTER
Wellness Screening Tool  Symptom Screening:  Do you have one of the following NEW symptoms:    Fever (subjective or >100.0)?  No    A new cough?  No    Shortness of breath?  No     Chills? No     New loss of taste or smell? No     Generalized body aches? No     New persistent headache? No     New sore throat? No     Nausea, vomiting, or diarrhea?  No    Within the past 3 weeks, have you been exposed to someone with a known positive illness below:    COVID-19 (known or suspected)?  No    Chicken pox?  No    Mealses?  No    Pertussis?  No    Patient notified of visitor policy- They may have one person accompany them to their appointment, but they will need to wear a mask and will be screened upon arrival for symptoms: Yes  Pt informed to wear a mask: Yes  Pt notified if they develop any symptoms listed above, prior to their appointment, they are to call the clinic directly at [phone] for further instructions.  Yes  Patient's appointment status: Patient will be seen in clinic as scheduled on 8/5
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
## 2021-06-10 NOTE — PROGRESS NOTES
ITP ASSESSMENT   Assessment Day: 30 Day    Session Number: 10  Precautions: Standard cardiac sx, B knee, L shoulder    Diagnosis: Stent    Risk Stratification: Medium    Referring Provider: Breanna Garcia MD   ITPs sent to Dr. Espinal   EXERCISE  Exercise Assessment: Reassessment                         Exercise Plan  Goals Next 30 days  ADL'S: Goal #1: Pt to resume climbing 18 stairs (w/2 landings) at home with less SOB. Pt to utilize stairs in cardiac rehab at a 4.3+ MET level for 5 minutes.     Leisure: Goal #2: Pt to increase upper body strength but cautioning L shoulder/upper arm by utilizing Nustep w/arms, arm ergometer for 5-10 minutes and/or weights starting at 2lbs 2 sets of 10 reps 1-2x/week in cardiac rehab to help support pulling weeds.     Work: LTG: Pt to resume hiking for 30-60 minutes without any cv sx/sx. Mock a 5-6 MET level. Pt also to get to 130lbs as a LTG of weight loss. Continue same goal.       Education Goals: All goals in this section met    Education Goals Met: Medication review.;Has system for taking medication.;Patient can state cardiac s/s and appropriate emergency response.                          Goals Met  Initial ADL's goals met: Goal #1 MET: Pt has resumed grocery shopping while pushing a cart for 20-30 minutes without any cv sx/sx. Pt has reached a >3.5 MET level to tolerate this activity in cardiac rehab.     Initial Leisure goals met: Goal #2 MET: Pt has resumed sweeping kitchen floor for ~10 minutes without any cv sx/sx. Pt has reached a >2.5 MEt level to resume activity.     Intial Work goals met: LTG Not MET: Pt has not resumed hiking for 30-60 minutes without any cv sx/sx. Mock a 5-6 MET level-pt currently at a 3.9 MET on the TM. Pt also to get to 130lbs as a LTG of weight loss. Pt has lost 3 lbs and getting closer to her goal.     Initial Progression: Pt has reached a 3.9 MET Level for 20 minutes on the TM and a 2.9-3.1 MET level on the Nustep for 20-25 minutes. Pt has  used the arm ergometer for 5 min and also started weights at 2lbs 2 sets of 10 lbs. Pt limited at times with L shoulder/upper arm pain that is worse with certain movement but pt states over the past few months it has improved otherwise pt not limited by much. Pt is diligent with attending rehab.       Exercise Prescription  Exercise Mode: Treadmill;Bike;Nustep;Arm Erg.;Stairs;Hallway Walking    Frequency: 2x/week     Duration: 40-45 minutes    Intensity / THR: ([place] 121-135bpm )    RPE 11-14  Progression / Met level: 3.9-4.3+     Resistive Training?: Yes (Still cautioning L shoulder/bicep )      Current Exercise (mins/week): 170      Interventions  Home Exercise:  Mode: Walking     Frequency: 2-3x/week    Duration: 30-60 minutes      Education Material : Educational videos;Provide written material;Individual education and counseling;Offer educational classes      Education Completed  Exercise Education Completed: Cardiac Anatomy;Signs and Symptoms;Medication review;RPE;Emergency Plan;Home Exercise;Warm up/cool down;FITT Principles;BP/HR Reponse to exercise;Stretching;Strength training;Benefits of Exercise;End point of exercise              Exercise Follow-up/Discharge  Follow up/Discharge: Skilled therapy needed to continue to monitor pt for any arrhythmias or any EKG changes s/p stent x 2 and for proper cv repsonse to exercise. BP has been very controlled. Pt needs to be monitored to safely guide and progress pt to a LTG of 5.5-6 METs to resume hiking and carrying 1-15lbs upstairs. Pt has noticed an increase in her endurance and stamina and is pleased with progress. Pt is walking 2x/week outside of rehab and we did talk about staying diligent with this. Pt has lost 3lbs to help support her goal of getting to 130lb which has been very encouraging for patient. Pt is pleased with progress.    NUTRITION  Nutrition Assessment: Reassessment      Nutrition Risk Factors:  Nutrition Risk Factors:  Dyslipidemia  Cholesterol: 133  LDL: 75  HDL: 34  Triglycerides: 118      Nutrition Plan  Interventions  Diet Consult: NA    Other Nutrition Intervention: Diet Class;Therapist/Pt Discussion;Provide with Written Material    Initial Rate Your Plate Score: 67    Education Completed  Nutrition Education Completed: Low Saturated fat diet;Low sodium diet      Goals  Nutrition Goals (Next 30 days): Patient will follow a low sodium diet;Patient will follow a low saturated fat diet;Patient knows appropriate portion size      Goals Met  Nutrition Goals Met: Patient follows a low sodium diet;Patient states following a low saturated fat diet;Patient knows appropriate portion size      Height, Weight, and  BMI  Weight: 137 lb (62.1 kg)  Height: 5' (1.524 m)  BMI: 26.76      Nutrition Follow-up  Follow-up/Discharge: Discussed protein sources in traditional ([place]) diet       Other Risk Factors  Other Risk Factor Assessment: Reassessment      HTN Risk Factor: NA      Pre Exercise BP: 114/66  Post Exercise BP: 116/68      Tobacco Risk Factor: NA    Risk Factor Follow-up   Follow-up/Discharge: Pt not on any bp meds and did run mildly hypertensive today but she is checking her bp at home daily and it has been consistenly <120/80. Pt is following low sodium diet also.        PSYCHOSOCIAL  Psychosocial Assessment: Reassessment       [place] COOP Q of L Summary Score: 23      PHQ-9 Total Score: 5      Psychosocial Risk Factor: NA      Psychosocial Plan  Interventions  Interventions: Offer educational videos and classes;Provide written material;Individual education and counseling      Education Completed  Education Completed: Relaxation/Coping Techniques;Effects of stress on body;S/S of depression      Goals  Goals (Next 30 days): Improvement in [employer] COOP score      Goals Met  Goals Met: Identified Support system;Oriented to stress management classes;Identify stressors;Practicing stress management skills      Psychosocial  Follow-up  Follow-up/Discharge: Pt denies stress. Pt enjoys watching tv, reading and going on walks        Patient involved in Goal setting?: Yes      Signature: _____________________________________________________________    Date: __________________    Time: __________________

## 2021-06-11 NOTE — PATIENT INSTRUCTIONS - HE
Cayla Harman,    It was a pleasure to see you today at the [facility].     My recommendations after this visit include:    Change brillanta to plavix    WAvelino Morris MD, FACC, FASGIOVANNI

## 2021-06-11 NOTE — PROGRESS NOTES
Assessment:   1. Acute nasopharyngitis  Information regarding diagnosis was given to patient and instruction to follow up if symptoms are not improving  - loratadine (CLARITIN) 10 mg tablet; Take 1 tablet (10 mg total) by mouth daily.  Dispense: 30 tablet; Refill: 2  - ibuprofen (MOTRIN IB) 200 MG tablet; Take 2 tablets (400 mg total) by mouth every 6 (six) hours as needed for pain.  Dispense: 100 tablet; Refill: 2  - benzonatate (TESSALON PERLES) 100 MG capsule; Take 1 capsule (100 mg total) by mouth 3 (three) times a day as needed for cough.  Dispense: 30 capsule; Refill: 0     Plan:   Explained lack of efficacy of antibiotics in viral disease.  Antitussives per medication orders.  Avoid exposure to tobacco smoke and fumes.  Call if shortness of breath worsens, blood in sputum, change in character of cough, development of fever or chills, inability to maintain nutrition and hydration. Avoid exposure to tobacco smoke and fumes.  Trial of antihistamines.     Subjective:   Cayla Harman is a 62 y.o. female here for evaluation of a cough. Onset of symptoms was 10 days ago. Symptoms have been gradually worsening since that time. The cough is productive of clear sputum and is aggravated by nothing. Associated symptoms include: change in voice, chills, postnasal drip, ear ache and sputum production. Patient does not have a history of asthma. Patient does not have a history of environmental allergens. Patient has not traveled recently. Patient does not have a history of smoking. Patient has not had a previous chest x-ray. Patient has not had a PPD done.    The following portions of the patient's history were reviewed and updated as appropriate:   She  has a past medical history of Hyperlipidemia.  She  does not have any pertinent problems on file.  She  has a past surgical history that includes surg rx missed abortn,1st tri; induced abortn by d&c; and esophagogastroduodenoscopy transoral diagnostic.  Her family history  "includes No Medical Problems in her daughter, maternal aunt, maternal grandfather, maternal grandmother, paternal aunt, paternal grandfather, and paternal grandmother. There is no history of BRCA 1/2, Breast cancer, Cancer, Colon cancer, Endometrial cancer, or Ovarian cancer.  She  reports that she has never smoked. She has never used smokeless tobacco. She reports that she drinks alcohol. She reports that she does not use illicit drugs.  Current Outpatient Prescriptions   Medication Sig Dispense Refill     acetaminophen (TYLENOL) 500 MG tablet Take 500 mg by mouth every 6 (six) hours as needed for pain.       calcium, as carbonate, (OS-DAMIR) 500 mg calcium (1,250 mg) tablet Take 1 tablet by mouth daily.       CHOLECALCIFEROL, VITAMIN D3, (VITAMIN D3 ORAL) Take 5,000 Units by mouth daily. TABS       ferrous sulfate 325 (65 FE) MG tablet Take 1 tablet by mouth daily. As directed       rosuvastatin (CRESTOR) 5 MG tablet TAKE 1 TABLET DAILY 90 tablet 3     terbinafine HCl (LAMISIL) 250 mg tablet 1 TABLET DAILY BY MOUTH  1     No current facility-administered medications for this visit.      Current Outpatient Prescriptions on File Prior to Visit   Medication Sig     calcium, as carbonate, (OS-DAMIR) 500 mg calcium (1,250 mg) tablet Take 1 tablet by mouth daily.     CHOLECALCIFEROL, VITAMIN D3, (VITAMIN D3 ORAL) Take 5,000 Units by mouth daily. TABS     ferrous sulfate 325 (65 FE) MG tablet Take 1 tablet by mouth daily. As directed     rosuvastatin (CRESTOR) 5 MG tablet TAKE 1 TABLET DAILY     terbinafine HCl (LAMISIL) 250 mg tablet 1 TABLET DAILY BY MOUTH     No current facility-administered medications on file prior to visit.      She has No Known Allergies..    Review of Systems  A 12 point comprehensive review of systems was negative except as noted.      Objective:      Oxygen saturation 97% on room air  /58  Pulse 80  Temp 98  F (36.7  C) (Oral)   Ht 4' 11\" (1.499 m)  Wt 131 lb 11.2 oz (59.7 kg)  SpO2 97% "  BMI 26.6 kg/m2  General appearance: alert, appears stated age and cooperative  Head: Normocephalic, without obvious abnormality, atraumatic  Eyes: conjunctivae/corneas clear. PERRL, EOM's intact. Fundi benign.  Ears: normal TM's and external ear canals both ears  Nose: clear discharge  Throat: lips, mucosa, and tongue normal; teeth and gums normal  Neck: no adenopathy, no carotid bruit, no JVD, supple, symmetrical, trachea midline and thyroid not enlarged, symmetric, no tenderness/mass/nodules  Lungs: clear to auscultation bilaterally  Heart: regular rate and rhythm, S1, S2 normal, no murmur, click, rub or gallop  Pulses: 2+ and symmetric  Skin: Skin color, texture, turgor normal. No rashes or lesions  Lymph nodes: Cervical, supraclavicular, and axillary nodes normal.  Neurologic: Grossly normal

## 2021-06-11 NOTE — TELEPHONE ENCOUNTER
Wellness Screening Tool  Symptom Screening:  Do you have one of the following NEW symptoms:    Fever (subjective or >100.0)?  No    A new cough?  No    Shortness of breath?  No     Chills? No     New loss of taste or smell? No     Generalized body aches? No     New persistent headache? No     New sore throat? No     Nausea, vomiting, or diarrhea?  No    Within the past 2 weeks, have you been exposed to someone with a known positive illness below:    COVID-19 (known or suspected)?  No    Chicken pox?  No    Mealses?  No    Pertussis?  No    Patient notified of visitor policy- They may have one person accompany them to their appointment, but they will need to wear a mask and will be screened upon arrival for symptoms: Yes  Pt informed to wear a mask: Yes  Pt notified if they develop any symptoms listed above, prior to their appointment, they are to call the clinic directly at [phone] for further instructions.  Yes  Patient's appointment status: Patient will be seen in clinic as scheduled on 9/14/20

## 2021-06-11 NOTE — PROGRESS NOTES
Cayla Harman has participated in 14 sessions of Phase II Cardiac Rehab.    Progress Report:   Cardiac Rehab Treatment Progress Report 8/24/2020 8/28/2020 8/31/2020 9/4/2020 9/11/2020   Weight 137 lbs 137 lbs 136 lbs 136 lbs 136 lbs   Pre Exercise  HR 57 70 58 57 63   Pre Exercise /66 118/66 112/60 104/60 112/62   Treadmill Peak HR 97 89 91 94 95   Treadmill Peak Blood Pressure - - - - -   Nustep Peak Heart Rate 97 105 96 96 96   Nustep Peak Blood Pressure 148/68 142/68 170/62 150/72 142/68   Heart Rate 67 72 68 58 68   Post Exercise /66 112/68 116/64 110/60 112/60   ECG SR SR/ST SR SR SR   Total Exercise Minutes 40 43 39 40 47         Current Status:  Symptomatic SOB, fatigue. Patient has reached 3.9 METs on the Treadmill and 3.5 METs on the Nustep.    If Physician recommends change in treatment plan, please place orders.        __________________________________________________      _____________  Signature                                                                                                  Date

## 2021-06-11 NOTE — PROGRESS NOTES
ITP ASSESSMENT   Assessment Day: 60 Day    Session Number: 17  Precautions: Standard cardiac sx, B knee, L shoulder     Diagnosis: Stent    Risk Stratification: Medium    Referring Provider: Breanna Garcia MD   ITPs sent to Dr. Espinal   EXERCISE  Exercise Assessment: Reassessment                         Exercise Plan  Goals Next 30 days  ADL'S: Goal #1: Pt to resume climbing 18 stairs (w/2 landings) at home with less SOB. Pt to utilize stairs in cardiac rehab at a 4.3+ MET level for 5 minutes.     Leisure: Goal #2: Pt to tolerate 4.5-4.7 METs in rehab for 20-30 min    Work: LTG: Pt to resume hiking for 30-60 minutes without any cv sx/sx. Mock a 5-6 MET level. Pt also to get to 130lbs as a LTG of weight loss. Continue same goal.       Education Goals: All goals in this section met    Education Goals Met: Medication review.;Has system for taking medication.;Patient can state cardiac s/s and appropriate emergency response.                          Goals Met  30 day ADL'S goals met: Goal not met: Pt continues to feel fatigue and SOB on stairs     30 day Leisure goals met: Goal met: Pt is doing upper body strength training in rehab 1-2x/week.     30 day Work goals met: Goal not met: Pt has not reached 5-6 METs    30 Day Progression: Pt has reached 3.5 METs on Nustep and 4.3 METs on TM      Initial ADL's goals met: Goal #1 MET: Pt has resumed grocery shopping while pushing a cart for 20-30 minutes without any cv sx/sx. Pt has reached a >3.5 MET level to tolerate this activity in cardiac rehab.     Initial Leisure goals met: Goal #2 MET: Pt has resumed sweeping kitchen floor for ~10 minutes without any cv sx/sx. Pt has reached a >2.5 MEt level to resume activity.     Intial Work goals met: LTG Not MET: Pt has not resumed hiking for 30-60 minutes without any cv sx/sx. Mock a 5-6 MET level-pt currently at a 3.9 MET on the TM. Pt also to get to 130lbs as a LTG of weight loss. Pt has lost 3 lbs and getting closer to her  goal.     Initial Progression: Pt has reached a 3.9 MET Level for 20 minutes on the TM and a 2.9-3.1 MET level on the Nustep for 20-25 minutes. Pt has used the arm ergometer for 5 min and also started weights at 2lbs 2 sets of 10 lbs. Pt limited at times with L shoulder/upper arm pain that is worse with certain movement but pt states over the past few months it has improved otherwise pt not limited by much. Pt is diligent with attending rehab.       Exercise Prescription  Exercise Mode: Treadmill;Bike;Nustep;Arm Erg.;Stairs;Hallway Walking    Frequency: 2x/week     Duration: 40-45 minutes     Intensity / THR: ([place] 121-135bpm )    RPE 11-14  Progression / Met level: 4.5-5    Resistive Training?: Yes (Still cautioning L shoulder/bicep )      Current Exercise (mins/week): 170      Interventions  Home Exercise:  Mode: walking     Frequency: 2-3x/week    Duration: 30-60 minutes       Education Material : Educational videos;Provide written material;Individual education and counseling;Offer educational classes      Education Completed  Exercise Education Completed: Cardiac Anatomy;Signs and Symptoms;Medication review;RPE;Emergency Plan;Home Exercise;Warm up/cool down;FITT Principles;BP/HR Reponse to exercise;Stretching;Strength training;Benefits of Exercise;End point of exercise              Exercise Follow-up/Discharge  Follow up/Discharge: Skilled therapy needed to continue to monitor pt for any arrhythmias or any EKG changes s/p stent x 2 and for proper cv repsonse to exercise. BP has been very controlled. Pt needs to be monitored to safely guide and progress pt to a LTG of 5.5-6 METs to resume hiking and carrying 1-15lbs upstairs. Pt has noticed an increase in her endurance and stamina and is pleased with progress. Pt is walking 2x/week outside of rehab and we did talk about staying diligent with this. Pt has lost 3lbs to help support her goal of getting to 130lb which has been very encouraging for patient. Pt is  pleased with progress.    NUTRITION  Nutrition Assessment: Reassessment      Nutrition Risk Factors:  Nutrition Risk Factors: Dyslipidemia  Cholesterol: 133  LDL: 75  HDL: 34  Triglycerides: 118      Nutrition Plan  Interventions  Diet Consult: NA    Other Nutrition Intervention: Diet Class;Therapist/Pt Discussion;Provide with Written Material    Initial Rate Your Plate Score: 67    Education Completed  Nutrition Education Completed: Low Saturated fat diet;Low sodium diet      Goals  Nutrition Goals (Next 30 days): Patient will follow a low sodium diet;Patient will follow a low saturated fat diet;Patient knows appropriate portion size      Goals Met  Nutrition Goals Met: Patient follows a low sodium diet;Patient states following a low saturated fat diet;Patient knows appropriate portion size      Height, Weight, and  BMI  Weight: 137 lb (62.1 kg)  Height: 5' (1.524 m)  BMI: 26.76      Nutrition Follow-up  Follow-up/Discharge: Pt reports having a healthy diet and not added butter to diet.         Other Risk Factors  Other Risk Factor Assessment: Reassessment      HTN Risk Factor: NA      Pre Exercise BP: 118/64  Post Exercise BP: 96/60      Tobacco Risk Factor: NA   PSYCHOSOCIAL  Psychosocial Assessment: Reassessment       [place] COOP Q of L Summary Score: 23      PHQ-9 Total Score: 5      Psychosocial Risk Factor: NA      Psychosocial Plan  Interventions  Interventions: Offer educational videos and classes;Provide written material;Individual education and counseling      Education Completed  Education Completed: Relaxation/Coping Techniques;Effects of stress on body;S/S of depression      Goals  Goals (Next 30 days): Improvement in [employer] COOP score      Goals Met  Goals Met: Identified Support system;Oriented to stress management classes;Identify stressors;Practicing stress management skills      Psychosocial Follow-up  Follow-up/Discharge: Pt denies stress. Pt enjoys watching tv, reading and going on walks         Patient involved in Goal setting?: Yes    Signature: _____________________________________________________________    Date: __________________    Time: __________________

## 2021-06-12 NOTE — PROGRESS NOTES
1. Osteoporosis  denosumab 60 mg (PROLIA 60 mg/ml)       Return in about 6 months (around 1/20/2018) for Recheck.    Patient Instructions   Prolia 5th today.  Prolia 6th in 6 months with my nurse. I will see you in 1 year.  DXA in 2 years .   Phone number to schedule [phone].  Please avoid any extensive dental work as implants and teeth extractions for the next 1-2 months.  Daily calcium need is [id number] mg a day from the diet and supplements.  Calcium citrate is easier to digest.  Vitamin D 2000 IU daily recommended.      Other medications that we can try are Fosamax, Boniva, Actonel.      Chief Complaint   Patient presents with     Osteoporosis Follow Up       /62  Pulse 64  Wt 133 lb 6.4 oz (60.5 kg)  BMI 26.94 kg/m2      Did you experience any problems with previous Prolia injection? no  Any medication change in the last 6 months? no  Did you take prednisone or other immunosupressant drugs in the last 6 months   (chemo, transplant, rheum, dermatology conditions)? no  Did you have any serious infection in the last 6 months?no  Any recent hospitalizations?no  Do you plan any dental work in the next 2-3 months?no  How much calcium do you take daily from the diet and supplements?1200 mg  How much vit D do you take daily? 2000 IU  Last DXA? 6/6/2017      Patient is here today for the 5th Prolia injection. Patient tolerated previous injections well.   We discussed calcium and vit D daily needs today.   Next Prolia injection will be in 6 months.     She has a lot of questions today. The cost of the Prolia is high and she has high deductible insurance plan. Based on her DXA scan, she should stay on the active treatment. We discussed option to switch to bisphosphonate. At the end, she decided to stay on Prolia. She also had questions about bio identical hormones.      25 minutes spent with the patient and more then 50 % of the time in counseling.  This note has been dictated using voice recognition  software. Any grammatical or context distortions are unintentional and inherent to the software      Patient Active Problem List   Diagnosis     Hyperlipidemia     Thalassemia Anemia     Vitamin D Deficiency     Drug Allergy     Back Pain     Reactions To Sulfa Drugs     Osteoporosis       Current Outpatient Prescriptions on File Prior to Visit   Medication Sig Dispense Refill     calcium, as carbonate, (OS-DAMIR) 500 mg calcium (1,250 mg) tablet Take 1 tablet by mouth daily.       CHOLECALCIFEROL, VITAMIN D3, (VITAMIN D3 ORAL) Take 5,000 Units by mouth daily. TABS       ferrous sulfate 325 (65 FE) MG tablet Take 1 tablet by mouth daily. As directed       ibuprofen (MOTRIN IB) 200 MG tablet Take 2 tablets (400 mg total) by mouth every 6 (six) hours as needed for pain. 100 tablet 2     rosuvastatin (CRESTOR) 5 MG tablet TAKE 1 TABLET DAILY 90 tablet 3     [DISCONTINUED] acetaminophen (TYLENOL) 500 MG tablet Take 500 mg by mouth every 6 (six) hours as needed for pain.       [DISCONTINUED] benzonatate (TESSALON PERLES) 100 MG capsule Take 1 capsule (100 mg total) by mouth 3 (three) times a day as needed for cough. 30 capsule 0     [DISCONTINUED] loratadine (CLARITIN) 10 mg tablet Take 1 tablet (10 mg total) by mouth daily. 30 tablet 2     [DISCONTINUED] terbinafine HCl (LAMISIL) 250 mg tablet 1 TABLET DAILY BY MOUTH  1     No current facility-administered medications on file prior to visit.

## 2021-06-12 NOTE — PROGRESS NOTES
ITP ASSESSMENT   Assessment Day: 90 Day    Session Number: 18  Precautions: Standard cardiac sx, B knee, L shoulder     Diagnosis: Stent    Risk Stratification: Medium    Referring Provider: Breanna Garcia MD  EXERCISE  Exercise Assessment: Reassessment                         Exercise Plan  Goals Next 30 days  Goal #1: Continue weight loss goal of 1+lb per month with LTG <130lbs. Current weight is 136lbs. Start a regular strength training program 2-3x/weeks and intervals to help with weight loss goals.     Goal#2/LTG: Continue to increase stamina with less SORIA with walking program and to overall increase METs in CR to LTG of 5.5-6METs on a variety of modalities and utlize both continous and interval exercise to progress pt to LTG. Hiking is her LTG 5-6+METs for 30-60 minutes.      Education Goals: All goals in this section met    Education Goals Met: Medication review.;Has system for taking medication.;Patient can state cardiac s/s and appropriate emergency response.                          Goals Met  60 day ADL'S goals met: Goal met: Pt has less SOB with stair use at home. Pt was able to do stairs in CR at a 4.3MET for 5 mins without SOB    60 day Leisure goals met: Goal met:Goal #2: Pt to tolerate 4.5-4.7 METs in rehab for 20-30 min    60 day Work goals met: Goal NOT met: Continue with LTG of 5.5-6+METsLTG: Pt to resume hiking for 30-60 minutes without any cv sx/sx. Mock a 5-6 MET level. Pt also to get to 130lbs as a LTG of weight loss. Continue same goal.     60 Day Progression: Pt's only limitation is her continued SORIA with home ex program and her left shoulder. Continue to take caution with left shoulder when using arms and increasing weights for strength training program in CR. Pt has reached a 3.7MET on Nustep and 5MET on TM for 25'. Continue to increase METs with interval training to reach LTG of 5.5-6METs. Pt has noticed since she has stopped Brilinta and started on Plavix over the last 30 days she has  had a significant difference in her SOB-it has improved alot.      30 day ADL'S goals met: Goal not met: Pt continues to feel fatigue and SOB on stairs     30 day Leisure goals met: Goal met: Pt is doing upper body strength training in rehab 1-2x/week.     30 day Work goals met: Goal not met: Pt has not reached 5-6 METs    30 Day Progression: Pt has reached 3.5 METs on Nustep and 4.3 METs on TM      Initial ADL's goals met: Goal #1 MET: Pt has resumed grocery shopping while pushing a cart for 20-30 minutes without any cv sx/sx. Pt has reached a >3.5 MET level to tolerate this activity in cardiac rehab.     Initial Leisure goals met: Goal #2 MET: Pt has resumed sweeping kitchen floor for ~10 minutes without any cv sx/sx. Pt has reached a >2.5 MEt level to resume activity.     Intial Work goals met: LTG Not MET: Pt has not resumed hiking for 30-60 minutes without any cv sx/sx. Mock a 5-6 MET level-pt currently at a 3.9 MET on the TM. Pt also to get to 130lbs as a LTG of weight loss. Pt has lost 3 lbs and getting closer to her goal.     Initial Progression: Pt has reached a 3.9 MET Level for 20 minutes on the TM and a 2.9-3.1 MET level on the Nustep for 20-25 minutes. Pt has used the arm ergometer for 5 min and also started weights at 2lbs 2 sets of 10 lbs. Pt limited at times with L shoulder/upper arm pain that is worse with certain movement but pt states over the past few months it has improved otherwise pt not limited by much. Pt is diligent with attending rehab.       Exercise Prescription  Exercise Mode: Treadmill;Bike;Nustep;Arm Erg.;Stairs;Hallway Walking    Frequency: 2x/week    Duration: 30-60minutes    Intensity / THR: 20-30 beats above resting heart rate ([place] 65-80%:121-135bpm )    RPE 11-14  Progression / Met level: 5-6+    Resistive Training?: Yes (Left shoulder precautions)      Current Exercise (mins/week): 310      Interventions  Home Exercise:  Mode: Walking (Pt is trying to figure out HEP with  weather changing.)    Frequency: 2-3x/week on non rehab days    Duration: 30-60 minutes      Education Material : Educational videos;Provide written material;Individual education and counseling;Offer educational classes      Education Completed  Exercise Education Completed: Cardiac Anatomy;Signs and Symptoms;Medication review;RPE;Emergency Plan;Home Exercise;Warm up/cool down;FITT Principles;BP/HR Reponse to exercise;Stretching;Strength training;Benefits of Exercise;End point of exercise              Exercise Follow-up/Discharge  Follow up/Discharge: Continue to monitor pt's EKG s/p JULIO. Continue to monitor for proper cv response.   NUTRITION  Nutrition Assessment: Reassessment      Nutrition Risk Factors:  Nutrition Risk Factors: Dyslipidemia  Cholesterol: 119 (9/14/20)  LDL: 45  HDL: 37  Triglycerides: 183      Nutrition Plan  Interventions  Diet Consult: YANNICK    Other Nutrition Intervention: Diet Class;Therapist/Pt Discussion;Provide with Written Material    Initial Rate Your Plate Score: 67    Follow-Up Rate Your Plate Score: 72      Education Completed  Nutrition Education Completed: Low Saturated fat diet;Low sodium diet;Risk factor overview      Goals  Nutrition Goals (Next 30 days): Patient will lose weight;Patient demonstrated understanding of cardiac nutrition, no goals identified for the next 30 days      Goals Met  Nutrition Goals Met: Patient follows a low sodium diet;Patient states following a low saturated fat diet;Patient knows appropriate portion size;Reviewed Dietitian schedule;Provided Rate your Plate Survey;Completed Nutritional Risk Screen;Patient can identify their risk factors for CAD      Height, Weight, and  BMI  Weight: 136 lb (61.7 kg)  Height: 5' (1.524 m)  BMI: 26.56      Nutrition Follow-up  Follow-up/Discharge: Pt continues to work on weight loss goals. Start a regular strength training program and increase weight as tolerated and start interval training to help with weight loss  goals.         Other Risk Factors  Other Risk Factor Assessment: Reassessment (N/A)      HTN Risk Factor: NA      Pre Exercise BP: 92/60  Post Exercise BP: 102/60      Tobacco Risk Factor: NA  r Follow-up   Follow-up/Discharge: CRF: Overweight, DLD-pt is taking medications as prescribed, exercising and eating heart healthy/vegitarian diet.     PSYCHOSOCIAL  Psychosocial Assessment: Reassessment       [facility] COOP Q of L Summary Score: 23 (pre)      PHQ-9 Total Score: 5 (pre)      Psychosocial Risk Factor: NA      Psychosocial Plan  Interventions  Interventions: Offer educational videos and classes;Provide written material;Individual education and counseling      Education Completed  Education Completed: Relaxation/Coping Techniques;Effects of stress on body;S/S of depression      Goals  Goals (Next 30 days): Patient demonstrates understanding of stress, no goals identified for the next 30 days;Improvement in [employer] COOP score      Goals Met  Goals Met: Identified Support system;Oriented to stress management classes;Identify stressors;Practicing stress management skills      Psychosocial Follow-up  Follow-up/Discharge: Pt continues to states she does not have alot of stress. She feels she is managing stressors well. She has a good support system and enjoys watching TV, reading and exercising for relaxation.             Patient involved in Goal setting?: Yes

## 2021-06-12 NOTE — TELEPHONE ENCOUNTER
Patient has CSS appointment scheduled for 11/6/2020 to receive pneumococcal vaccine. Order pended if appropriate.  Amelia Gutierrez CMA ............... 5:21 PM, 11/02/20

## 2021-06-12 NOTE — PROGRESS NOTES
ITP ASSESSMENT   Assessment Day: 120 Day    Session Number: 19  Precautions: Standard cardiac sx, B knee, L shoulder     Diagnosis: Stent    Risk Stratification: Medium    Referring Provider: Dr. Cuellar  EXERCISE  Exercise Assessment: Reassessment                         Exercise Plan  Goals Next 30 days  ADL'S: Goal #1/LTG: Continue weight loss goal of 0.5-1+lb per month with LTG <130lbs. Current weight is 134.8lbs. Continue with  a regular strength training program 2-3x/weeks and intervals to help with weight loss goals. Increase from 2 sets to 3 sets of 10 reps. Plus try to increase weights being lifted. Current weights are 5,7, 15lb. Continue with interval training and increase levels as tolerated -LTG was 5.5-6+. for hiking (hiking next Spring 2021)    Education Goals: All goals in this section met    Education Goals Met: Medication review.;Has system for taking medication.;Patient can state cardiac s/s and appropriate emergency response.                          Goals Met  90 day ADL'S goals met: Pt has has lost weight she just has not been writing down decimal points-she rounds off. Pt's weight is 134.8lbs today 1.4lbs weight loss in the last 30 days. Continue with weight loss plan with some modifciations.    90 day Leisure goals met: Pt states that she has improved her stamina and has less SOB/SORIA and she is at least a 85-90% of improvement. She is happy with progress.Pt has reached a 5-6.2MET with interval training on TM in CR. Pt has reached her LTG overall with METs which was 5.5-6METs. Pt would like to complete her 36 sessions of CR and we will continue to increase METs as tolerated to mock Hiking. Pt does not plan on hiking until next spring with the change in weather recently it is not realistic for her to go hiking-but she feels that she is ready to return to ex.     90 Day Progress: Pt states she has not been lifting weights at home regularly-however lifting 2x/week in CR. Pt encouraged to find a  regular routine and lift M-W-F. Pt has reached a 5-6.2MET with intervals on TM. Pt is limited on bikes due to Wilfredo knee ache. Watch for shin discomfort on TM with increased levels.      60 day ADL'S goals met: Goal met: Pt has less SOB with stair use at home. Pt was able to do stairs in CR at a 4.3MET for 5 mins without SOB    60 day Leisure goals met: Goal met:Goal #2: Pt to tolerate 4.5-4.7 METs in rehab for 20-30 min    60 day Work goals met: Goal NOT met: Continue with LTG of 5.5-6+METsLTG: Pt to resume hiking for 30-60 minutes without any cv sx/sx. Mock a 5-6 MET level. Pt also to get to 130lbs as a LTG of weight loss. Continue same goal.     60 Day Progression: Pt's only limitation is her continued SORIA with home ex program and her left shoulder. Continue to take caution with left shoulder when using arms and increasing weights for strength training program in CR. Pt has reached a 3.7MET on Nustep and 5MET on TM for 25'. Continue to increase METs with interval training to reach LTG of 5.5-6METs. Pt has noticed since she has stopped Brilinta and started on Plavix over the last 30 days she has had a significant difference in her SOB-it has improved alot.      30 day ADL'S goals met: Goal not met: Pt continues to feel fatigue and SOB on stairs     30 day Leisure goals met: Goal met: Pt is doing upper body strength training in rehab 1-2x/week.     30 day Work goals met: Goal not met: Pt has not reached 5-6 METs    30 Day Progression: Pt has reached 3.5 METs on Nustep and 4.3 METs on TM      Initial ADL's goals met: Goal #1 MET: Pt has resumed grocery shopping while pushing a cart for 20-30 minutes without any cv sx/sx. Pt has reached a >3.5 MET level to tolerate this activity in cardiac rehab.     Initial Leisure goals met: Goal #2 MET: Pt has resumed sweeping kitchen floor for ~10 minutes without any cv sx/sx. Pt has reached a >2.5 MEt level to resume activity.     Intial Work goals met: LTG Not MET: Pt has not  resumed hiking for 30-60 minutes without any cv sx/sx. Mock a 5-6 MET level-pt currently at a 3.9 MET on the TM. Pt also to get to 130lbs as a LTG of weight loss. Pt has lost 3 lbs and getting closer to her goal.     Initial Progression: Pt has reached a 3.9 MET Level for 20 minutes on the TM and a 2.9-3.1 MET level on the Nustep for 20-25 minutes. Pt has used the arm ergometer for 5 min and also started weights at 2lbs 2 sets of 10 lbs. Pt limited at times with L shoulder/upper arm pain that is worse with certain movement but pt states over the past few months it has improved otherwise pt not limited by much. Pt is diligent with attending rehab.       Exercise Prescription  Exercise Mode: Treadmill;Bike;Nustep;Arm Erg.;Stairs;Hallway Walking    Frequency: 2x/week    Duration: 30-60 minutes    Intensity / THR: 20-30 beats above resting heart rate (Karvonen 65-80%:121-135bpm.)    RPE 11-14  Progression / Met level: 5.2-6.2+    Resistive Training?: Yes      Current Exercise (mins/week): 175      Interventions  Home Exercise:  Mode: Walking and Lifting weights 2-3x/week  (Pending purchase of TM to arrive for HEP)    Frequency: 4-6x/week     Duration: 30-60 minutes      Education Material : Educational videos;Provide written material;Individual education and counseling;Offer educational classes      Education Completed  Exercise Education Completed: Cardiac Anatomy;Signs and Symptoms;Medication review;RPE;Emergency Plan;Home Exercise;Warm up/cool down;FITT Principles;BP/HR Reponse to exercise;Stretching;Strength training;Benefits of Exercise;End point of exercise              Exercise Follow-up/Discharge  Follow up/Discharge: Continue to monitor EKG for arrhythmias s/p PCI. Monitor for proper cv response. Continue to encourage increase in weights for strength training and watch for proper technique. Continue to safely progress pt from 5-6.2 METs to 5.2 to 6.2+ METs to improve stamina.   NUTRITION  Nutrition Assessment:  Reassessment    Nutrition Risk Factors:  Nutrition Risk Factors: Dyslipidemia  Cholesterol: 119(9/14/20)  LDL: 45  HDL: 37  Triglycerides: 183    Nutrition Plan  Interventions  Diet Consult: NA    Other Nutrition Intervention: Diet Class;Therapist/Pt Discussion;Provide with Written Material    Initial Rate Your Plate Score: 67    Follow-Up Rate Your Plate Score: 72      Education Completed  Nutrition Education Completed: Low Saturated fat diet;Low sodium diet;Risk factor overview      Goals  Nutrition Goals (Next 30 days): Patient demonstrated understanding of cardiac nutrition, no goals identified for the next 30 days      Goals Met  Nutrition Goals Met: Patient follows a low sodium diet;Patient states following a low saturated fat diet;Patient knows appropriate portion size;Reviewed Dietitian schedule;Provided Rate your Plate Survey;Completed Nutritional Risk Screen;Patient can identify their risk factors for CAD      Height, Weight, and  BMI  Weight: 134 lb 12.8 oz (61.1 kg)  Height: 5' (1.524 m)  BMI: 26.33      Nutrition Follow-up  Follow-up/Discharge: Pt has lost weight-continue to change up program to help with weight loss. Pt is eating heart healthy  with lower fats, low cholesterol and low salt. Pt is watching portions to also to help with weight loss program. Pt encouraged to continue with regular exercise program with aerobic ex and strengh training.         Other Risk Factors  Other Risk Factor Assessment: Reassessment      HTN Risk Factor: NA      Pre Exercise BP: 132/64  Post Exercise BP: 96/60 (asymptomatic)      Tobacco Risk Factor: NA    Risk Factor Follow-up   Follow-up/Discharge: CRF: Overweight, DLD-pt is taking medications as prescribed, exercising and eating heart healthy/vegitarian diet.     PSYCHOSOCIAL  Psychosocial Assessment: Reassessment       [facility] ADY Q of L Summary Score: 23      PHQ-9 Total Score: 5      Psychosocial Risk Factor: NA      Psychosocial  Plan  Interventions  Interventions: Offer educational videos and classes;Provide written material;Individual education and counseling      Education Completed  Education Completed: Relaxation/Coping Techniques;Effects of stress on body;S/S of depression      Goals  Goals (Next 30 days): Patient demonstrates understanding of stress, no goals identified for the next 30 days;Improvement in [facility] COOP score      Goals Met  Goals Met: Identified Support system;Oriented to stress management classes;Identify stressors;Practicing stress management skills         Patient involved in Goal setting?: Yes

## 2021-06-13 NOTE — PROGRESS NOTES
"ITP ASSESSMENT   Assessment Day: 150 Day (Last Day)    Session Number: 35/36 (Last Day)  Precautions: Standard cardiac sx/sx, B knee, L shoudler    Diagnosis: Stent    Risk Stratification: Medium    Referring Provider: Breanna Garcia MD   ITP: Dr. Espinal  EXERCISE  Exercise Assessment: Discharge       6 Minute Walk Test   Pre   Pre Exercise HR: 66                    Pre Exercise BP: 102/60      Peak  Peak HR: 99                   Peak BP: 148/68    Peak feet: 1545    Peak O2 SAT: 100    Peak RPE: 5-6    Peak MPH: 2.93      Symptoms:  Peak Symptoms: 0.5/10 L sided chest \"pinching\"      5 mins. Post  5 Min Post HR: 62    5 Min Post BP: 100/60                           Exercise Plan    Education Goals: All goals in this section met    Education Goals Met: Medication review.;Has system for taking medication.;Patient can state cardiac s/s and appropriate emergency response.                          Goals Met                                    120 day ADL'S goals met: Goal #1/LTG: Pt has lost another lb since last update down to 134lbs. Pt has continued to increase her MET Level by utilizing interval training to a 6.2 MET level for 33 minutes on the TM and also a 6.6 MET level on the stairs for 5 minutes. Pt has increased to 3 sets of 10 reps of 5,7,15 and is utilizing strength training 2x/week in cardiac rehab and 1x/week at home. Pt has reached a MET level to resume hiking in the spring.     120 Day Progress: Pt has reached a 6.2 MET level on the TM utilizing interval training for 33 minutes. PT has reached a 6.6 MET level on the stairs for 5 minutes. Pt has reached a 3.7-4 MET level on the Nustep for 30 minutes. Pt has been doing weights 3 sets of 10 reps and has done the arm ergometer for 5 minutes. Pt has progressed well and reached MET level goals. Pt is back to all activities without any complications.       90 day ADL'S goals met: Pt has has lost weight she just has not been writing down decimal points-she " rounds off. Pt's weight is 134.8lbs today 1.4lbs weight loss in the last 30 days. Continue with weight loss plan with some modifciations.    90 day Leisure goals met: Pt states that she has improved her stamina and has less SOB/SORIA and she is at least a 85-90% of improvement. She is happy with progress.Pt has reached a 5-6.2MET with interval training on TM in CR. Pt has reached her LTG overall with METs which was 5.5-6METs. Pt would like to complete her 36 sessions of CR and we will continue to increase METs as tolerated to mock Hiking. Pt does not plan on hiking until next spring with the change in weather recently it is not realistic for her to go hiking-but she feels that she is ready to return to ex.       90 Day Progress: Pt states she has not been lifting weights at home regularly-however lifting 2x/week in CR. Pt encouraged to find a regular routine and lift M-W-F. Pt has reached a 5-6.2MET with intervals on TM. Pt is limited on bikes due to Wilfredo knee ache. Watch for shin discomfort on TM with increased levels.      60 day ADL'S goals met: Goal met: Pt has less SOB with stair use at home. Pt was able to do stairs in CR at a 4.3MET for 5 mins without SOB    60 day Leisure goals met: Goal met:Goal #2: Pt to tolerate 4.5-4.7 METs in rehab for 20-30 min    60 day Work goals met: Goal NOT met: Continue with LTG of 5.5-6+METsLTG: Pt to resume hiking for 30-60 minutes without any cv sx/sx. Mock a 5-6 MET level. Pt also to get to 130lbs as a LTG of weight loss. Continue same goal.     60 Day Progression: Pt's only limitation is her continued SORIA with home ex program and her left shoulder. Continue to take caution with left shoulder when using arms and increasing weights for strength training program in CR. Pt has reached a 3.7MET on Nustep and 5MET on TM for 25'. Continue to increase METs with interval training to reach LTG of 5.5-6METs. Pt has noticed since she has stopped Brilinta and started on Plavix over the last  30 days she has had a significant difference in her SOB-it has improved alot.      30 day ADL'S goals met: Goal not met: Pt continues to feel fatigue and SOB on stairs     30 day Leisure goals met: Goal met: Pt is doing upper body strength training in rehab 1-2x/week.     30 day Work goals met: Goal not met: Pt has not reached 5-6 METs    30 Day Progression: Pt has reached 3.5 METs on Nustep and 4.3 METs on TM      Initial ADL's goals met: Goal #1 MET: Pt has resumed grocery shopping while pushing a cart for 20-30 minutes without any cv sx/sx. Pt has reached a >3.5 MET level to tolerate this activity in cardiac rehab.     Initial Leisure goals met: Goal #2 MET: Pt has resumed sweeping kitchen floor for ~10 minutes without any cv sx/sx. Pt has reached a >2.5 MEt level to resume activity.     Intial Work goals met: LTG Not MET: Pt has not resumed hiking for 30-60 minutes without any cv sx/sx. Mock a 5-6 MET level-pt currently at a 3.9 MET on the TM. Pt also to get to 130lbs as a LTG of weight loss. Pt has lost 3 lbs and getting closer to her goal.     Initial Progression: Pt has reached a 3.9 MET Level for 20 minutes on the TM and a 2.9-3.1 MET level on the Nustep for 20-25 minutes. Pt has used the arm ergometer for 5 min and also started weights at 2lbs 2 sets of 10 lbs. Pt limited at times with L shoulder/upper arm pain that is worse with certain movement but pt states over the past few months it has improved otherwise pt not limited by much. Pt is diligent with attending rehab.       Exercise Prescription    Intensity / THR:  (Karvonen 121-135bpm)    RPE 11-14  Progression / Met level: 5.2-6.2+    Resistive Training?: Yes      Current Exercise (mins/week): 200      Interventions  Home Exercise:  Mode: Walking, TM and Lifting Weights 2-3x/week    Frequency: 4-6x/week    Duration: 30-60 minutes      Education Material : Provide written material;Individual education and counseling      Education Completed  Exercise  "Education Completed: Cardiac Anatomy;Signs and Symptoms;Medication review;Home Exercise;Emergency Plan;RPE;Warm up/cool down;FITT Principles;BP/HR Reponse to exercise;Stretching;Strength training;Benefits of Exercise;End point of exercise              Exercise Follow-up/Discharge  Follow up/Discharge: Pt has continued to increase her endurance and stamina and has reached a max MET level of 6.6 METS. Pt feels she is back to all activities. Pt had improvement in 6'walk and survey scores and is very pleased with progress. Pt is pleased in progress and feels better both physically and emotionally. Pt does feel her education on heart disease has improved also. Pt will be walking at home and is waiting for her new TM to arrival and will continue with HEP. Pt did note a L side \"pinching\" sensation when she arrived today that started last night- felt better with exercise. None with leaving and we did review emergency plan otherwise pt has no other complaints.    NUTRITION  Nutrition Assessment: Discharge      Nutrition Risk Factors:  Nutrition Risk Factors: Dyslipidemia  Cholesterol: 119 (9/14/20)  LDL: 45  HDL: 37  Triglycerides: 183      Nutrition Plan  Interventions  Diet Consult: Completed (8/12/2020)    Other Nutrition Intervention: Therapist/Pt Discussion;Diet Class;Provide with Written Material    Initial Rate Your Plate Score: 67    Pre Initial Rate Your Plate Score: 67   Follow-Up Rate Your Plate Score: 72      Education Completed  Nutrition Education Completed: Low Saturated fat diet;Risk factor overview;Low sodium diet;Weight management        Goals Met  Nutrition Goals Met: Patient can identify their risk factors for CAD;Patient follows a low sodium diet;Completed Nutritional Risk Screen;Provided Rate your Plate Survey;Reviewed Dietitian schedule;Patient states following a low saturated fat diet;Patient knows appropriate portion size;Patient has lost weight;Rate Your Plate Survey Score Improved      Height, " Weight, and  BMI  Weight: 134 lb (60.8 kg)  Height: 5' (1.524 m)  BMI: 26.17    Pre BMI: 26.17     Nutrition Follow-up  Follow-up/Discharge: Pt has continued with weight loss and is down ~6lbs since starting and wants to lose about 3 more lbs to get to her weight goal. Pt does not eat meat and does continue to get her protein from protein powder and lentiles. Pt is eating fresh fruit and vegetables. Pt does not eat out and is drinking water. Pt is reading labels and does not add any extra salt. Pt is pleased with weight loss with changing to a more higher protein diet and changing up her exercise program. Pt is compliant with statin. Pt does not add butter. Pt feels she is eating a heart healthy diet >90% of the time and has no further questions at this time. Pt did met with the dietician. Pt is a vegetarian and does not add butter.          Other Risk Factors  Other Risk Factor Assessment: Discharge      HTN Risk Factor: NA      Pre Exercise BP: 102/60  Post Exercise BP: 98/60 (asymptomatic)      Tobacco Risk Factor: NA    Risk Factor Follow-up   Follow-up/Discharge: CRF: Overweight, Dyslipidemia. Pt is following a heart healthy lifestyle. Pt does check her bp 1x/week at home Pt is compliant with medications.      PSYCHOSOCIAL  Psychosocial Assessment: Discharge       [place] COOP Q of L Summary Score: 21    Pre [place] COOP Q of L Summary Score: 25     PHQ-9 Total Score: 0    Pre PHQ-9 Total Score: 5     Psychosocial Risk Factor: NA      Psychosocial Plan  Interventions  If PHQ-9 is >9, send letter to MD  Interventions: Provide written material;Individual education and counseling      Education Completed  Education Completed: Relaxation/Coping Techniques;S/S of depression;Effects of stress on body      Goals Met  Goals Met: Identified Support system;Oriented to stress management classes;Identify stressors;Improvement in [place] COOP score;Practicing stress management skills (23-21 and JALEN-D  5-0)      Psychosocial Follow-up  Follow-up/Discharge: Pt continues to report she does not have a lot of stress and manages it well when she does have stress. Pt has a good support system in her family. Pt enjoys watching TV, reading and exercising for relaxation. Pt is very pleased with her progress in cardiac rehab and does feel better physically and emotionally since starting. Pt's confidence has improved.              Patient involved in Goal setting?: Yes

## 2021-06-13 NOTE — PATIENT INSTRUCTIONS - HE
Cayla Harman,    It was a pleasure to see you today at the [facility].     My recommendations after this visit include:    Ct heart    WAvelino Morris MD, FACC, ARON

## 2021-06-13 NOTE — PROGRESS NOTES
[facility] Exercise Program/Discharge Summary  You have reached a 6.6 MET level and have completed 36 sessions of Cardiac Rehab.   Exercise Goals:   4-6x/week for aerobic exercise 30-60 minutes and 2-3x/week for strength training.   Modality Duration Intensity/  Rate of Perceived Exertion  OMNI Scale (1-10)   Warm-up 5 minutes 2-3   Walk 25+ minutes 4-7   Treadmill 25+ minutes 4-7   Cool Down 5 minutes 2-3   Strength Training *every other day 10-15 minutes 3 sets of 10 reps  Increase weights as tolerated.   Stretching 5 minutes 2-3   Continuous Exercise Heart Rate Guidelines:   20-30bpm> RHR (Resting Heart Rate) or Karvonen (65 to 80: 121-135 bpm)  Interval Exercise Program:  MIIT(1-5minutes): 50-60%: 107-116 bpm and/or RPE-O of 4-7  HIIT (1-2 minutes): 80%: 145 bpm and/or RPE-O of 7-9  X 5 cycles  Alternate between straight aerobic exercise and continuous exercise  TM:  2.5mph for 5 minute warm up  3.5%/3.2mph for 3 minutes  5.5%/3.4mph for 2 minutes  x5 cycles  2.5mph for 3 min cool down   Special Recommendations:    Continue to follow low fat, low salt, heart healthy diet.    Continue to follow up with your doctors/providers as recommended (e.g cholesterol).    A well rounded exercise program will included aerobic/cardiovascular exercise (e.g like walking, biking, or swimming ), strength training (e.g. free weights, exercise bands, or weight machines) and stretching program.   Stop Exercise!!! If any of the following occur:    Angina/chest pain    Dizziness    Excessive perspiration/cold sweats    Abnormal shortness of breath    Changes in heart rate (slow, fast, irregular)    Sudden fatigue or numbness    Nausea  Also...    Avoid extreme temperatures - exercise indoors if necessary:   Temp+ Humidity >160, Temp-Wind Chill <20    Wait at least 1 hour after a meal before strenuous activity    Do not exercise if you have a fever or are ill    Wear comfortable, supportive athletic clothing and  shoes.  You are now on your way to a heart healthy lifestyle on your own. You can do it!

## 2021-06-13 NOTE — TELEPHONE ENCOUNTER
Wellness Screening Tool  Symptom Screening:  Do you have one of the following NEW symptoms:    Fever (subjective or >100.0)?  No    A new cough?  No    Shortness of breath?  No     Chills? No     New loss of taste or smell? No     Generalized body aches? No     New persistent headache? No     New sore throat? No     Nausea, vomiting, or diarrhea?  No    Within the past 2 weeks, have you been exposed to someone with a known positive illness below:    COVID-19 (known or suspected)?  No    Chicken pox?  No    Mealses?  No    Pertussis?  No    Patient notified of visitor policy- No visitors are allowed to accompany the patient at this time. Yes  Pt informed to wear a mask: Yes  Pt notified if they develop any symptoms listed above, prior to their appointment, they are to call the clinic directly at [phone] for further instructions.  Yes  Patient's appointment status: Patient will be seen in clinic as scheduled on 12/11

## 2021-06-14 ENCOUNTER — RECORDS - HEALTHEAST (OUTPATIENT)
Dept: ADMINISTRATIVE | Facility: OTHER | Age: 66
End: 2021-06-14

## 2021-06-15 NOTE — PROGRESS NOTES
Chief Complaint   Patient presents with     Prolia #6     1. Did you experience any problems with previous Prolia injection? NO  2. Do you feel sick today?(fever, RS, GI,  issues)? NO  3. Any medication changes in the last 6 months? NO  4. Did you take prednisone or other immunosuppressant drugs in the last 6 months?(chemo, transplant, rheu, dermatology conditions)? NO  5. Did you have any serious infection in the last 6 months? (pancreatitis) NO  6. Any recent hospitalizations/ surgeries (especially gastric bypass, thyroid, parathyroid)? NO  7. Do you plan any dental work?(especially implants and extractions) in the next 2-3 months? NO  8. Did you have any fractures in the last year? NO    Yearly F/U appointment  with Dr. Simpson is made.     Hue Miles CMA [facility] 1/25/2018 9:38 AM

## 2021-06-16 PROBLEM — R07.2 PRECORDIAL PAIN: Status: ACTIVE | Noted: 2020-06-12

## 2021-06-16 PROBLEM — R03.0 ELEVATED BLOOD PRESSURE READING WITHOUT DIAGNOSIS OF HYPERTENSION: Status: ACTIVE | Noted: 2020-08-04

## 2021-06-16 NOTE — PROGRESS NOTES
"Prolia Injection Phone Screen      Screening questions have been asked 2-3 days prior to administration visit for Prolia. If any questions are answered with \"Yes,\" this phone encounter were will routed to ordering provider for further evaluation.     1.  When was the last injection?  8/11/2020    2.  Has insurance for this injection been verified?  Yes    3.  Did you experience any new onset achiness or rashes that lasted for over a month with your previous Prolia injection?   No    4.  Do you have a fever over 101?F or a new deep cough that is unusual for you today? No    5.  Have you started any new medications in the last 6 months that you were told could affect your immune system? These may have been prescribed by oncologist, transplant, rheumatology, or dermatology.   No    6.  In the last 6 months have you have gastric bypass or parathyroid surgery?   No    7.  Do you plan dental work requiring drilling into the bone such as implants/extractions or oral surgery in the next 2-3 months?   No    Patient informed if symptoms discussed above present prior to their administration appointment, they are to notify clinic immediately.  The following steps were completed to comply with the REMS program for Prolia:  1. Ordering provider has previously reviewed information in the Medication Guide and Patient Counseling Chart, including the serious risks of Prolia  and the symptoms of each risk and have been advised to seek prompt medical attention if they have signs or symptoms of any of the serious risks.  2. Provided each patient a copy of the Medication Guide and Patient Brochure.  See MAR for administration details.   Indication: Prolia  (denosumab) is a prescription medicine used to treat osteoporosis in patients who:   Are at high risk for fracture, meaning patients who have had a fracture related to osteoporosis, or who have multiple risk factors for fracture; Cannot use another osteoporosis medicine or other " osteoporosis medicines did not work well.   The timeline for early/late injections would be 4 weeks early and any time after the 6 month maged. If a patient receives their injection late, then the subsequent injection would be 6 months from the date that they actually received the injection    Have the screening questions been asked prior to this administration? Yes     Hue Allen

## 2021-06-16 NOTE — TELEPHONE ENCOUNTER
Telephone Encounter by Marylin Simpson MD at 2/4/2020  8:47 AM     Author: Marylin Simpson MD Service: -- Author Type: Physician    Filed: 2/4/2020  8:47 AM Encounter Date: 2/3/2020 Status: Signed    : Marylin Simpson MD (Physician)       Marylin Simpson MD   Physician   Internal Medicine   Progress Notes   Signed   Encounter Date:  1/23/2020               Signed             []Hide copied text    []Hover for details  I could not find second Dg. Please send this info to PA team. Thanks
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
## 2021-06-16 NOTE — TELEPHONE ENCOUNTER
Patient is on CSS schedule for 3/23/21 for Prolia. Patient will need to wait 2 weeks after 2nd COVID to receive Prolia injection. Please help her reschedule    Amelia Gutierrez CMA ............... 5:43 PM, 03/19/21

## 2021-06-17 NOTE — PATIENT INSTRUCTIONS - HE
Cayla Harman,    It was a pleasure to see you today at the [facility].     My recommendations after this visit include:    Stop plavix    WAvelino Morris MD, FACC, ARON

## 2021-06-17 NOTE — PROGRESS NOTES
Chief Complaint   Patient presents with     Shingrix     ABN was given/ signed and sent to medical records to juni Miles CMA [facility] 4/5/2018 10:19 AM

## 2021-06-17 NOTE — TELEPHONE ENCOUNTER
RN cannot approve Refill Request    RN can NOT refill this medication Protocol failed and NO refill given. Last office visit: Visit date not found Last Physical: Visit date not found Last MTM visit: Visit date not found Last visit same specialty: 5/13/2021 Davey Morris (MD Esmer.  Next visit within 3 mo: Visit date not found  Next physical within 3 mo: Visit date not found      Zita Wilson, [facility] Triage/Med Refill 5/22/2021    Requested Prescriptions   Pending Prescriptions Disp Refills     rosuvastatin (CRESTOR) 10 MG tablet [Pharmacy Med Name: Rosuvastatin Calcium Oral Tablet 10 MG] 30 tablet 0     Sig: TAKE ONE TABLET BY MOUTH AT BEDTIME       There is no refill protocol information for this order

## 2021-06-17 NOTE — TELEPHONE ENCOUNTER
Telephone Encounter by Violet Iyer RN at 7/6/2020  1:09 PM     Author: Violet Iyer RN Service: -- Author Type: Registered Nurse    Filed: 7/6/2020  2:58 PM Encounter Date: 7/2/2020 Status: Addendum    : Violet Iyer RN (Registered Nurse)    Related Notes: Original Note by Violet Iyer RN (Registered Nurse) filed at 7/6/2020  1:39 PM       Viviana Jordan Mallory J, RN    Caller: Unspecified (Today, 10:41 AM)               General phone call:     Caller: Cayla Rodriguez     Primary cardiologist: KOSTAS     Detailed reason for call: Pt states she does not want to wait until 7/28 when KOSTAS has his first opening to make an appt to speak to him. Pt is requesting a call back with KOSTAS's recommendations on what the next steps are regarding her angiogram procedure and to discuss her CTA results.     Best phone number: [phone]     Best time to contact: Anytime     Ok to leave a detailed message? Yes     Device? No     Additional Info:      Pt was able to schedule appointment with KOSTAS to discuss next week. Will get pt set-up for procedure at that time. -kcl

## 2021-06-17 NOTE — TELEPHONE ENCOUNTER
Telephone Encounter by Violet Iyer RN at 7/6/2020 10:09 AM     Author: Violet Iyer RN Service: -- Author Type: Registered Nurse    Filed: 7/6/2020 10:23 AM Encounter Date: 7/2/2020 Status: Signed    : Violet Iyer RN (Registered Nurse)       Celia No Mallory J, RN    Caller: Unspecified (Today,  9:40 AM)               General phone call:     Caller: Pt   Primary cardiologist: Alexandra   Detailed reason for call: Pt wants to discuss next steps for surgery- needs to understand test results that showed blockage   New or active symptoms?   Best phone number: [phone]   Best time to contact:   Ok to leave a detailed message?   Device? Yes     Additional Info:      Discussed with Cayla Morris's recommendations regarding cor angio procedure. She had multiple questions that caller answered however, she felt it would be best to set up an appointment with [facility] to discuss procedure in more detail. Pt was given scheduling number to call and arrange appointment -kcl

## 2021-06-18 NOTE — PATIENT INSTRUCTIONS - HE
Patient Instructions by Tracy Hwang NP at 8/5/2020  9:10 AM     Author: Tracy Hwang NP Service: -- Author Type: Nurse Practitioner    Filed: 8/5/2020  9:14 AM Encounter Date: 8/5/2020 Status: Signed    : Tracy Hwang NP (Nurse Practitioner)       Cayla Kimani,    It was a pleasure to see you today at the [facility].     My recommendations after this visit include:    - No medications changes made today    - Please seek medical attention if you develop chest pain, shortness of breath or similar symptoms you had prior to recent stent placement    - Follow up with Dr. Morris in 4 weeks    - Please call [phone], if you have any questions or concerns    Tracy Hwang CNP      Medication     o Take all your medications as prescribed  o Do not stop any medications without talking with a healthcare provider    Exercise      o Physical activity is important for overall health  o Set a goal of 150 minutes of exercise each week  o For example, 30 minutes of exercise 5 days each week.    o These 30 minutes can be broken into shorter periods of 15 minutes twice daily or 10 minutes three times daily  o Start any exercise program slowly and work towards the goal of 150 minutes each week  o For example, you may start with 10 minutes and plan to add a few minutes each week as you get stronger   o Examples of exercise include walking, swimming, or biking  o Remember to stretch and stay hydrated with exercise    Diet     o A heart healthy diet includes:  o A variety of fruits and vegetables  o Whole grains  o Low-fat dairy (fat-free, 1% fat, and low-fat)  o Lean meats and poultry without skin   o Fish (eat fish 2 times each week)  o Nuts  o Limit saturated fat to about 13 grams each day (based on a 2000 calorie diet)  o Limit red meat  o Limit sugars (sweets and sugary beverages)  o Limit your portion sizes  o Do not add salt to your food when cooking or at the table  o Limit alcohol intake  (no more than 1 drink each day for women or 2 drinks each day for men)    Weight Loss     o Work on losing weight with diet and exercise  o You BMI (body mass index) should be between 18.5-24.9  o This is a calculation of your weight and height  o Please ask your healthcare provider for your BMI    Manage Other Chronic Health Conditions     o Control cholesterol  o Eat a diet low in saturated fat  o Exercise   o Take a statin medication as prescribed  o Manage blood pressure  o Eat a diet low in sodium  o Exercise  o Reduce stress  o Lose weight   o Take blood pressure medications as prescribed  o Control blood sugars if diabetic  o Monitor sugars and carbohydrates in your diet  o Lose weight   o Take diabetes medications as prescribed  o Follow-up with your primary care provider to make sure your blood sugars are well controlled    Stress Reduction     o Find time each day to relax  o Reading, listening to music, yoga, meditation, exercise, spending time with friends and family, volunteering   o Get 6-8 hours of sleep each night    Smoking Cessation     o Smoking causes numerous health problems including coronary artery disease  o It is never too late to quit  o Set realistic goals for quitting  o Decrease the number of cigarettes used each week  o Use nicotine gum or patches to help you quit    Information from the American Heart Association.  Please visit their website at www.heart.org    Patient Education     Eating Heart-Healthy Foods  Eating has a big impact on your heart health. In fact, eating healthier can improve several of your heart risks at once. For instance, it helps you manage weight, cholesterol, and blood pressure. Here are ideas to help you make heart-healthy changes without giving up all the foods and flavors you love.  Getting started    Talk with your healthcare provider about eating plans, such as the DASH or Mediterranean diet. You may also be referred to a dietitian.    Change a few things  at a time. Give yourself time to get used to a few eating changes before adding more.    Work to create a tasty, healthy eating plan that you can stick to for the rest of your life.    Goals for healthy eating  Below are some tips to improve your eating habits:    Limit saturated fats and trans fats. Saturated fats raise your levels of cholesterol, so keep these fats to a minimum. They are found in foods such as fatty meats, whole milk, cheese, and palm and coconut oils. Avoid trans fats because they lower good cholesterol as well as raise bad cholesterol. Trans fats are most often found in processed foods.    Reduce sodium (salt) intake. Eating too much salt may increase your blood pressure. Limit your sodium intake to 2,300 milligrams (mg) per day (the amount in 1 teaspoon of salt), or less if your healthcare provider recommends it. Dining out less often and eating fewer processed foods are two great ways to decrease the amount of salt you consume.    Managing calories. A calorie is a unit of energy. Your body burns calories for fuel, but if you eat more calories than your body burns, the extras are stored as fat. Your healthcare provider can help you create a diet plan to manage your calories. This will likely include eating healthier foods as well as exercising regularly. To help you track your progress, keep a diary to record what you eat and how often you exercise.  Choose the right foods  Aim to make these foods staples of your diet. If you have diabetes, you may have different recommendations than what is listed here:    Fruits and vegetables provide plenty of nutrients without a lot of calories. At meals, fill half your plate with these foods. Split the other half of your plate between whole grains and lean protein.    Whole grains are high in fiber and rich in vitamins and nutrients. Good choices include whole-wheat bread, pasta, and brown rice.    Lean proteins give you nutrition with less fat. Good  choices include fish, skinless chicken, and beans.    Low-fat or nonfat dairy provides nutrients without a lot of fat. Try low-fat or nonfat milk, cheese, or yogurt.    Healthy fats can be good for you in small amounts. These are unsaturated fats, such as olive oil, nuts, and fish. Try to have at least 2 servings per week of fatty fish, such as salmon, sardines, mackerel, rainbow trout, and albacore tuna. These contain omega-3 fatty acids, which are good for your heart. Flaxseed is another source of a heart-healthy fat.  More on heart-healthy eating  Read food labels  Healthy eating starts at the grocery store. Be sure to pay attention to food labels on packaged foods. Look for products that are high in fiber and protein, and low in saturated fat, cholesterol, and sodium. Avoid products that contain trans fat. And pay close attention to serving size. For instance, if you plan to eat two servings, double all the numbers on the label.  Prepare food right  A key part of healthy cooking is cutting down on added fat and salt. Look on the internet for lower-fat, lower-sodium recipes. Also, try these tips:    Remove fat from meat and skin from poultry before cooking.    Skim fat from the surface of soups and sauces.    Broil, boil, bake, steam, grill, and microwave food without added fats.    Choose ingredients that spice up your food without adding calories, fat, or sodium. Try these items: horseradish, hot sauce, lemon, mustard, nonfat salad dressings, and vinegar. For salt-free herbs and spices, try basil, cilantro, cinnamon, pepper, and rosemary.  Date Last Reviewed: 10/1/2017    2449-4495 [employer]. [address] [place] [address], [place] [address]. All rights reserved. This information is not intended as a substitute for professional medical care. Always follow your healthcare professional's instructions.

## 2021-06-19 NOTE — LETTER
Letter by Breanna Garcia MD at      Author: Breanna Garcia MD Service: -- Author Type: --    Filed:  Encounter Date: 10/3/2019 Status: Signed         Cayla Harman  [address] [facility] [address]             October 3, 2019         Dear Ms. Harman,    According to our records, it is the time of year for your annual wellness exam.  Please use my chart or call the clinic at [phone] and schedule an appointment with your provider.    Please call with questions or contact us using [employer].    Sincerely,        Electronically signed by Breanna Garcia MD

## 2021-06-19 NOTE — LETTER
Letter by Breanna Garcia MD at      Author: Breanna Garcia MD Service: -- Author Type: --    Filed:  Encounter Date: 5/9/2019 Status: (Other)         Cayla Harman  [address] [facility] [id number]             May 10, 2019         Dear Ms. Harman,    Below are the results from your recent visit:    Cologuard External Result   Order: [id number]     Status:  Final result   Visible to patient:  No (Not Released) Next appt:  06/11/2019 at 10:30 AM in Radiology ([facility])   Component 4/4/19   COLOGUARD_EXT Negative    Resulting Agency [facility]      Narrative   Performed by: [facility]   COLOGUARD Lab External Result             The results are negative.    Please call with questions or contact us using [id number].    Sincerely,        Electronically signed by Breanna Garcia MD

## 2021-06-19 NOTE — PROGRESS NOTES
1. Age-related osteoporosis without current pathological fracture  DXA Bone Density Scan       Return in about 6 months (around 1/27/2019) for Recheck.    Patient Instructions   Prolia 7th today.  Prolia 8th in 6 months with my nurse. I will see you in 1 year.    DXA in 6/2019 .   Phone number to schedule [phone].    Daily calcium need is 9846-3714 mg a day from the diet and supplements.  Calcium citrate is easier to digest.  Vitamin D 2000 IU daily recommended.      Chief Complaint   Patient presents with     Osteoporosis Follow Up     Routine Health Maintenance     due for 2nd Shingrix dose       /64 (Patient Site: Right Arm, Patient Position: Sitting, Cuff Size: Adult Regular)  Pulse 72  Wt 137 lb 14.4 oz (62.6 kg)  BMI 27.85 kg/m2      Did you experience any problems with previous Prolia injection? no  Any medication change in the last 6 months? no  Did you take prednisone or other immunosupressant drugs in the last 6 months   (chemo, transplant, rheum, dermatology conditions)? no  Did you have any serious infection in the last 6 months?no  Any recent hospitalizations?no  Do you plan any dental work in the next 2-3 months?no  How much calcium do you take daily from the diet and supplements?1200 mg  How much vit D do you take daily? 2000 IU  Last DXA? 6/2017      Patient is here today for the 7th Prolia injection. Patient tolerated previous injections well.   We discussed calcium and vit D daily needs today.   Next Prolia injection will be in 6 months.     15 minutes spent with the patient and more then 50 % of the time in counseling.  This note has been dictated using voice recognition software. Any grammatical or context distortions are unintentional and inherent to the software      Patient Active Problem List   Diagnosis     Hyperlipidemia     Thalassemia Anemia     Vitamin D Deficiency     Drug Allergy     Back Pain     Reactions To Sulfa Drugs     Osteoporosis       Current Outpatient  Prescriptions on File Prior to Visit   Medication Sig Dispense Refill     calcium, as carbonate, (OS-DAMIR) 500 mg calcium (1,250 mg) tablet Take 1 tablet by mouth daily.       CHOLECALCIFEROL, VITAMIN D3, (VITAMIN D3 ORAL) Take 5,000 Units by mouth daily. TABS       ferrous sulfate 325 (65 FE) MG tablet Take 1 tablet by mouth daily. As directed       rosuvastatin (CRESTOR) 5 MG tablet TAKE 1 TABLET DAILY 90 tablet 3     No current facility-administered medications on file prior to visit.

## 2021-06-20 NOTE — PROGRESS NOTES
Chief complaint: Travel consult    HPI: The patient is good to go to [facility] and went to a travel clinic where she received her yellow fever vaccine as well as her flu shot and she got an antibiotic.  They prescribed the oral typhoid vaccine because it lasts 5 years.  Her insurance will not pay for that and it would be $90 so she is here to get the typhoid shot so that it is covered by insurance    Objective:/62 (Patient Site: Right Arm, Patient Position: Sitting, Cuff Size: Adult Regular)  Pulse 60  Wt 137 lb 12.8 oz (62.5 kg)  Breastfeeding? No  BMI 27.83 kg/m2  She is in no distress.  We reviewed the CDC guidelines which she had already reviewed that in detail with the person at the travel clinic    Assessment: Typhoid vaccine    Plan: She wants the shot instead of the oral vaccine so that was done today and she will follow-up as needed

## 2021-06-23 NOTE — PROGRESS NOTES
"Prolia Injection Phone Screen      Screening questions have been asked 2-3 days prior to administration visit for Prolia. If any questions are answered with \"Yes,\" this phone encounter were will routed to ordering provider for further evaluation.     3.  Did you experience any new onset achiness or rashes that lasted for over a month with your previous Prolia injection?   No    4.  Do you have a fever over 101?F or a new deep cough that is unusual for you today? No    5.  Have you started any new medications in the last 6 months that you were told could affect your immune system? These may have been prescribed by oncologist, transplant, rheumatology, or dermatology.   No    6.  In the last 6 months have you have gastric bypass or parathyroid surgery?   No    7.  Do you plan dental work requiring drilling into the bone such as implants/extractions or oral surgery in the next 2-3 months?   No    8. Do you have new insurance since the last injection? [place] Approved    Patient informed if symptoms discussed above present prior to their administration appointment, they are to notify clinic immediately.     Radha Ortega          The following steps were completed to comply with the REMS program for Prolia:  1. Ordering provider has previously reviewed information in the Medication Guide and Patient Counseling Chart, including the serious risks of Prolia  and the symptoms of each risk and have been advised to seek prompt medical attention if they have signs or symptoms of any of the serious risks.  2. Provided each patient a copy of the Medication Guide and Patient Brochure.  See MAR for administration details.   Indication: Prolia  (denosumab) is a prescription medicine used to treat osteoporosis in patients who:   Are at high risk for fracture, meaning patients who have had a fracture related to osteoporosis, or who have multiple risk factors for fracture; Cannot use another osteoporosis medicine or other osteoporosis " medicines did not work well.   The timeline for early/late injections would be 4 weeks early and any time after the 6 month maged. If a patient receives their injection late, then the subsequent injection would be 6 months from the date that they actually received the injection    Have the screening questions been asked prior to this administration? Yes

## 2021-06-25 ENCOUNTER — AMBULATORY - HEALTHEAST (OUTPATIENT)
Dept: LAB | Facility: CLINIC | Age: 66
End: 2021-06-25

## 2021-06-25 DIAGNOSIS — E55.9 VITAMIN D DEFICIENCY: ICD-10-CM

## 2021-06-25 DIAGNOSIS — M81.0 AGE-RELATED OSTEOPOROSIS WITHOUT CURRENT PATHOLOGICAL FRACTURE: ICD-10-CM

## 2021-06-25 DIAGNOSIS — E78.5 HYPERLIPIDEMIA, UNSPECIFIED HYPERLIPIDEMIA TYPE: ICD-10-CM

## 2021-06-25 LAB
ALBUMIN SERPL-MCNC: 3.8 G/DL (ref 3.5–5)
ALP SERPL-CCNC: 68 U/L (ref 45–120)
ALT SERPL W P-5'-P-CCNC: 16 U/L (ref 0–45)
ANION GAP SERPL CALCULATED.3IONS-SCNC: 9 MMOL/L (ref 5–18)
AST SERPL W P-5'-P-CCNC: 14 U/L (ref 0–40)
BILIRUB SERPL-MCNC: 0.9 MG/DL (ref 0–1)
BUN SERPL-MCNC: 14 MG/DL (ref 8–22)
CALCIUM SERPL-MCNC: 9.5 MG/DL (ref 8.5–10.5)
CHLORIDE BLD-SCNC: 108 MMOL/L (ref 98–107)
CHOLEST SERPL-MCNC: 147 MG/DL
CO2 SERPL-SCNC: 25 MMOL/L (ref 22–31)
CREAT SERPL-MCNC: 0.76 MG/DL (ref 0.6–1.1)
FASTING STATUS PATIENT QL REPORTED: YES
GFR SERPL CREATININE-BSD FRML MDRD: >60 ML/MIN/1.73M2
GLUCOSE BLD-MCNC: 90 MG/DL (ref 70–125)
HDLC SERPL-MCNC: 43 MG/DL
LDLC SERPL CALC-MCNC: 85 MG/DL
POTASSIUM BLD-SCNC: 4.5 MMOL/L (ref 3.5–5)
PROT SERPL-MCNC: 6.7 G/DL (ref 6–8)
SODIUM SERPL-SCNC: 142 MMOL/L (ref 136–145)
TRIGL SERPL-MCNC: 96 MG/DL

## 2021-06-25 NOTE — TELEPHONE ENCOUNTER
Refill Approved    Rx renewed per Medication Renewal Policy. Medication was last renewed on 3/2/18.    Caridad Lundberg, [facility] Triage/Med Refill 3/21/2019     Requested Prescriptions   Pending Prescriptions Disp Refills     rosuvastatin (CRESTOR) 5 MG tablet [Pharmacy Med Name: ROSUVASTATIN CALCIUM 5 MG TAB] 90 tablet 2     Sig: TAKE 1 TABLET EVERY DAY    Statins Refill Protocol (Hmg CoA Reductase Inhibitors) Passed - 3/19/2019 11:11 AM       Passed - PCP or prescribing provider visit in past 12 months     Last office visit with prescriber/PCP: 10/5/2018 Breanna Garcia MD OR same dept: 10/5/2018 Breanna Garcia MD OR same specialty: 10/5/2018 Breanna Garcia MD  Last physical: 3/2/2018 Last MTM visit: Visit date not found   Next visit within 3 mo: Visit date not found  Next physical within 3 mo: Visit date not found  Prescriber OR PCP: Breanna Garcia MD  Last diagnosis associated with med order: 1. Hyperlipidemia, unspecified hyperlipidemia type  - rosuvastatin (CRESTOR) 5 MG tablet [Pharmacy Med Name: ROSUVASTATIN CALCIUM 5 MG TAB]; TAKE 1 TABLET EVERY DAY  Dispense: 90 tablet; Refill: 2    If protocol passes may refill for 12 months if within 3 months of last provider visit (or a total of 15 months).

## 2021-06-29 ENCOUNTER — RECORDS - HEALTHEAST (OUTPATIENT)
Dept: ADMINISTRATIVE | Facility: OTHER | Age: 66
End: 2021-06-29

## 2021-06-29 ENCOUNTER — RECORDS - HEALTHEAST (OUTPATIENT)
Dept: BONE DENSITY | Facility: CLINIC | Age: 66
End: 2021-06-29

## 2021-06-29 DIAGNOSIS — M81.0 AGE-RELATED OSTEOPOROSIS WITHOUT CURRENT PATHOLOGICAL FRACTURE: ICD-10-CM

## 2021-06-29 LAB — 25(OH)D3 SERPL-MCNC: 75.3 NG/ML (ref 30–80)

## 2021-06-29 NOTE — PROGRESS NOTES
Progress Notes by Tracy Hwang NP at 8/5/2020  9:10 AM     Author: Tracy Hwang NP Service: -- Author Type: Nurse Practitioner    Filed: 8/5/2020 10:02 AM Encounter Date: 8/5/2020 Status: Signed    : Tracy Hwang NP (Nurse Practitioner)               Assessment/Recommendations   Assessment:    1.  Coronary artery disease: Recent abnormal CTA suggesting borderline obstruction and LAD.  Coronary angiogram showed significant lesion with 90% stenosis in mid LAD which was successfully treated with a drug-eluting stents.    On dual antiplatelet therapy with aspirin 81 mg definitely and ticagrelor 90 mg twice a day for 12 months.    Patient denies any further left chest discomfort since recent stent placement.    Cardiac rehab has been initiated-tolerating well.    Right radial angios site and right femoral angios site are intact.    Most recent BMP, hemoglobin and platelets are reviewed-stable    2.  Dyslipidemia: Cayla Harman is on  statin therapy with rosuvastatin 10 mg daily. Most recent LDL is 75.  Tolerating increased dose of rosuvastatin with no adverse effect.  She does have a history of intolerance to other statin therapy.    3.  Elevated blood pressure with a history of hypertension: Blood pressure today is under excellent control.  Blood pressure and heart rate in the cardiac rehab has been stable.  Currently not on antihypertensive medications.    Plan/Recommendation:  1. We discussed the importance of antiplatelet therapy and talking with her cardiologist prior to stopping these medications for any reason.  Continue DAPT.  Monitor for bleeding.  Encouraged to seek medical attention if recurrent chest pain or shortness of breath.    2. Risk factor modification and lifestyle management topics were discussed including managing comorbidities, weight loss, heart healthy diet, exercise, stress reduction and alcohol use.      3. We discussed a diet low in saturated fat, weight loss, and exercise along  "with medication for better control of cholesterol.     4. Continue current hypertension regimen    Follow up with Dr. Morris in 4 - 6 weeks     History of Present Illness/Subjective    Ms. Cayla Harman is a 65 y.o. female with a past medical history of hypercholesterolemia, overweight, and elevated blood pressure with a history of hypertension, and new diagnosis of coronary artery disease who is seen at [facility] for post coronary intervention follow up.  Patient initially had a follow-up with Dr. Morris with concerns of atypical chest pain and left shoulder pain.  CTA coronaries showed calcium score of 17 with borderline obstruction in LAD.  Coronary angiogram showed significant lesion in mid LAD which was successfully treated with a drug-eluting stents.  She was recommended on dual antiplatelet therapy for a year.    She is in cardiac rehab and tolerating exercise without any cardiac symptoms.  Her blood pressure and heart rate is been stable.    Today, Cayla reports that her left-sided chest discomfort under her left breast has resolved since recent stent placement.  She also denies further shortness of breath.  However, she occasionally feels \"itch\" like sensation on the right chest once in a while.     She denies fatigue, lightheadedness, shortness of breath, dyspnea on exertion, orthopnea, PND, palpitations, chest pain, abdominal fullness/bloating and lower extremity edema.  She is following heart healthy diet.  She is vegetarian.  She does not report any bleeding complications or adverse effect from the medications.  She is taking medications as prescribed.  Her blood pressure is now normalized.    Coronary Angiogram: reviewed:  Results for orders placed during the hospital encounter of 07/20/20   Cardiac Catheterization [CATH01] 07/20/2020    Narrative 64 yo F, presenting with CP, with CTA suggestive of CAD    LM mild dz  LAD 90% serial stenoses in mid LAD, mild prox " and distal LAD  LCx mild dz  RCA mild dz    EDP 13mmHg    Successful PCI of mid LAD with 3x16 and 2.75x12 Synergy JULIO (spot stenting   rather than one long stent chosen in an attempt to avoid jailing ostium of   D2)  0% residual, FLAKO 3 flow post        Physical Examination Review of Systems   Vitals:    20 0916   BP: 112/62   Pulse: 72   Resp: 16     Body mass index is 27.36 kg/m .  Wt Readings from Last 3 Encounters:   20 140 lb 1.6 oz (63.5 kg)   20 139 lb (63 kg)   20 140 lb (63.5 kg)     General Appearance:   no distress, normal body habitus   ENT/Mouth: membranes moist, no oral lesions or bleeding gums.      EYES:  no scleral icterus, normal conjunctivae   Neck: no carotid bruits or thyromegaly   Chest/Lungs:   lungs are clear to auscultation, no rales or wheezing, equal chest wall expansion    Cardiovascular:    Heart rate regular. Normal first and second heart sounds with no murmurs, rubs, or gallops; the carotid, radial and posterior tibial pulses are intact, Jugular venous pressure flat with no, no edema bilaterally    Abdomen:  no organomegaly, masses, bruits, or tenderness; bowel sounds are present   Extremities  Puncture Site: no cyanosis or clubbing  Right radial and Right femoral site is soft with no bruising.  Radial pulses and Pedal pulses intact and symmetrical.  CMS intact.   Skin: no xanthelasma, warm.    Neurologic: normal  bilateral, no tremors     Psychiatric: alert and oriented x3, calm and cooperative              General: WNL  Eyes: WNL  Ears/Nose/Throat: WNL  Lungs: WNL  Heart: WNL  Stomach: WNL  Bladder: WNL  Muscle/Joints: WNL  Skin: WNL  Nervous System: WNL  Mental Health: WNL     Blood: WNL     Medical History  Surgical History Family History Social History   Past Medical History:   Diagnosis Date   ? Hyperlipidemia    ?  (normal spontaneous vaginal delivery)     X2    ? Thalassemia     Past Surgical History:   Procedure Laterality Date   ? CV CORONARY  ANGIOGRAM N/A 2020    Procedure: Coronary Angiogram;  Surgeon: Silvio Cuellar MD;  Location: [facility] Cath Lab;  Service: Cardiology   ? CV LEFT HEART CATHETERIZATION WO LEFT VETRICULOGRAM Left 2020    Procedure: Left Heart Catheterization Without Left Ventriculogram;  Surgeon: Silvio Cuellar MD;  Location: [facility] Cath Lab;  Service: Cardiology   ? MT ESOPHAGOGASTRODUODENOSCOPY TRANSORAL DIAGNOSTIC      Description: Esophagogastroduodenoscopy;  Recorded: 2008;  Comments: 2005; no sprue; no H.pylori   ? MT INDUCED ABORTN BY D&C      Description: Surgically Induced ;  Recorded: 2008;   ? MT SURG RX MISSED ABORTN,1ST TRI      Description: Surgical Treatment Of Missed ;  Recorded: 2008;    Family History   Problem Relation Age of Onset   ? COPD Mother     Social History     Socioeconomic History   ? Marital status:      Spouse name: Not on file   ? Number of children: Not on file   ? Years of education: Not on file   ? Highest education level: Not on file   Occupational History   ? Occupation:    Social Needs   ? Financial resource strain: Not on file   ? Food insecurity     Worry: Not on file     Inability: Not on file   ? Transportation needs     Medical: Not on file     Non-medical: Not on file   Tobacco Use   ? Smoking status: Never Smoker   ? Smokeless tobacco: Never Used   Substance and Sexual Activity   ? Alcohol use: Yes     Comment: occasional   ? Drug use: No   ? Sexual activity: Yes     Partners: Male     Birth control/protection: Post-menopausal   Lifestyle   ? Physical activity     Days per week: Not on file     Minutes per session: Not on file   ? Stress: Not on file   Relationships   ? Social connections     Talks on phone: Not on file     Gets together: Not on file     Attends [employer] service: Not on file     Active member of club or organization: Not on file     Attends meetings of clubs or organizations: Not on file      Relationship status: Not on file   ? Intimate partner violence     Fear of current or ex partner: Not on file     Emotionally abused: Not on file     Physically abused: Not on file     Forced sexual activity: Not on file   Other Topics Concern   ? Not on file   Social History Narrative   ? Not on file          Medications  Allergies   Current Outpatient Medications   Medication Sig Dispense Refill   ? aspirin 81 mg chewable tablet Chew 1 tablet (81 mg total) daily. Take aspirin indefinitely (for the rest of your life). 30 tablet 11   ? CALCIUM CITRATE-VITAMIN D3 ORAL Take 0.25 tablets by mouth daily.     ? CHOLECALCIFEROL, VITAMIN D3, (VITAMIN D3 ORAL) Take 5,000 Units by mouth daily. TABS     ? denosumab 60 mg/mL Syrg Inject 60 mg under the skin every 6 (six) months.     ? ferrous sulfate 325 (65 FE) MG tablet Take 1 tablet by mouth daily. As directed     ? rosuvastatin (CRESTOR) 10 MG tablet Take 1 tablet (10 mg total) by mouth at bedtime. 30 tablet 11   ? ticagrelor (BRILINTA) 90 mg Tab Take 1 tablet (90 mg total) by mouth 2 (two) times a day. For 12 months 60 tablet 11     No current facility-administered medications for this visit.     Allergies   Allergen Reactions   ? Statins-Hmg-Coa Reductase Inhibitors Myalgia     H/o statin intolerance to all statin except tolerating Rosuvastatin          Lab Results    Chemistry/lipid CBC Cardiac Enzymes/BNP/TSH/INR   Lab Results   Component Value Date    CHOL 133 07/20/2020    HDL 34 (L) 07/20/2020    LDLCALC 75 07/20/2020    TRIG 118 07/20/2020    CREATININE 0.82 07/20/2020    BUN 15 07/20/2020    K 4.4 07/20/2020     07/20/2020     (H) 07/20/2020    CO2 24 07/20/2020    Lab Results   Component Value Date    WBC 6.8 07/20/2020    HGB 10.4 (L) 07/20/2020    HCT 35.4 07/20/2020    MCV 65 (L) 07/20/2020     07/20/2020    Lab Results   Component Value Date    TROPONINI <0.01 06/09/2020    TSH 4.32 07/28/2016          This note has been dictated using  voice recognition software. Any grammatical, typographical, or context distortions are unintentional and inherent to the software

## 2021-06-29 NOTE — PROGRESS NOTES
Progress Notes by Davey Morris MD (Ted) at 6/12/2020  2:50 PM     Author: Davey Morris MD (Ted) Service: -- Author Type: Physician    Filed: 6/12/2020  3:40 PM Encounter Date: 6/12/2020 Status: Signed    : Davey Morris MD (Ted) (Physician)             Thank you, Dr. Kendall, for asking [facility] to evaluate Ms. Cayla Harman.      Assessment/Recommendations   Assessment:    Chest pain, atypical features  Left shoulder pain likely rotator cuff partial tear  Hypercholesterolemia on statin  Elevated blood pressure rule out hypertension    Plan:  CT coronary angiogram because of atypical chest pain and risk factors  I asked to check blood pressure at home.  Normal blood pressure should be less than 130 systolic and 85 diastolic       History of Present Illness    Ms. Cayla Harman is a 65 y.o. female who comes in for initial cardiac evaluation.  She has developed left-sided chest pain.  The pain was nonexertional.  There was no pleuritic features there is mild tenderness to the anterior chest.  She denies any injury to the chest.  There was no associated shortness of breath or heart palpitations.  She went to the emergency room.  She had normal EKG and normal troponin.  She also has left shoulder pain.  The pain radiates down her arm.  Is difficult for her to raise her left shoulder.  She has no history of known heart disease including coronary artery disease, valvular heart disease, cardiomyopathy.  She denies recent weight gain, PND, orthopnea.  She  She denies heart palpitations or syncope    ECG: Personally reviewed.  Normal sinus rhythm within normal limits         Physical Examination Review of Systems   Vitals:    06/12/20 1501   BP: 148/82   Pulse: 64   Resp: 16     Body mass index is 28.4 kg/m .  Wt Readings from Last 3 Encounters:   06/12/20 143 lb (64.9 kg)   06/09/20 140 lb (63.5 kg)   10/28/19 140 lb (63.5 kg)     General Appearance:   Alert,  cooperative, no distress, appears stated age   Head/ENT: Normocephalic, without obvious abnormality. Membranes moist      EYES:  no scleral icterus, normal conjunctivae   Neck: Supple, symmetrical, trachea midline, no adenopathy, thyroid: not enlarged, symmetric, no carotid bruit or JVD   Chest/Lungs:   Lungs are clear to auscultation, respirations unlabored. No tenderness or deformity    Cardiovascular:   Regular rhythm, S1, S2 normal, no murmur, rub or gallop.   Abdomen:  Soft, non-tender, bowel sounds active all four quadrants,  no masses, no organomegaly   Extremities: no cyanosis or clubbing. No edema   Skin: Skin color, texture, turgor normal, no rashes or lesions.    Psychiatric: Normal affect, calm   Neurologic: Alert and oriented x 3, moving all four extremities.     General: WNL  Eyes: WNL  Ears/Nose/Throat: WNL  Lungs: WNL  Heart: Chest Pain, Arm Pain  Stomach: WNL  Bladder: WNL  Muscle/Joints: Joint Pain  Skin: WNL  Nervous System: WNL  Mental Health: WNL     Blood: WNL     Medical History  Surgical History Family History Social History   Past Medical History:   Diagnosis Date   ? Hyperlipidemia    ?  (normal spontaneous vaginal delivery)     X2    ? Thalassemia     Past Surgical History:   Procedure Laterality Date   ? [place] ESOPHAGOGASTRODUODENOSCOPY TRANSORAL DIAGNOSTIC      Description: Esophagogastroduodenoscopy;  Recorded: 2008;  Comments: ; no sprue; no H.pylori   ? [place] INDUCED ABORTN BY D&C      Description: Surgically Induced ;  Recorded: 2008;   ? [place] SURG RX MISSED ABORTN,1ST TRI      Description: Surgical Treatment Of Missed ;  Recorded: 2008;    Brother had bypass surgery in 40s or 50s Social History     Socioeconomic History   ? Marital status:      Spouse name: Not on file   ? Number of children: Not on file   ? Years of education: Not on file   ? Highest education level: Not on file   Occupational History   ? Occupation:    Social Needs    ? Financial resource strain: Not on file   ? Food insecurity     Worry: Not on file     Inability: Not on file   ? Transportation needs     Medical: Not on file     Non-medical: Not on file   Tobacco Use   ? Smoking status: Never Smoker   ? Smokeless tobacco: Never Used   Substance and Sexual Activity   ? Alcohol use: Yes     Comment: occasional   ? Drug use: No   ? Sexual activity: Yes     Partners: Male     Birth control/protection: Post-menopausal   Lifestyle   ? Physical activity     Days per week: Not on file     Minutes per session: Not on file   ? Stress: Not on file   Relationships   ? Social connections     Talks on phone: Not on file     Gets together: Not on file     Attends Denominational service: Not on file     Active member of club or organization: Not on file     Attends meetings of clubs or organizations: Not on file     Relationship status: Not on file   ? Intimate partner violence     Fear of current or ex partner: Not on file     Emotionally abused: Not on file     Physically abused: Not on file     Forced sexual activity: Not on file   Other Topics Concern   ? Not on file   Social History Narrative   ? Not on file          Medications  Allergies   Current Outpatient Medications   Medication Sig Dispense Refill   ? calcium, as carbonate, (OS-DAMIR) 500 mg calcium (1,250 mg) tablet Take 0.25 tablets by mouth daily.      ? CHOLECALCIFEROL, VITAMIN D3, (VITAMIN D3 ORAL) Take 5,000 Units by mouth daily. TABS     ? denosumab 60 mg/mL Syrg Inject 60 mg under the skin every 6 (six) months.     ? ferrous sulfate 325 (65 FE) MG tablet Take 1 tablet by mouth daily. As directed     ? ibuprofen (ADVIL,MOTRIN) 200 MG tablet Take 600 mg by mouth every 8 (eight) hours as needed for pain.     ? rosuvastatin (CRESTOR) 5 MG tablet TAKE 1 TABLET EVERY DAY 90 tablet 1     Current Facility-Administered Medications   Medication Dose Route Frequency Provider Last Rate Last Dose   ? denosumab 60 mg (PROLIA 60 mg/ml)  60 mg  Subcutaneous Q6 Months Marylin Simpson MD       ? denosumab 60 mg (PROLIA 60 mg/ml)  60 mg Subcutaneous Q6 Months Marylin Simpson MD       ? denosumab 60 mg (PROLIA 60 mg/ml)  60 mg Subcutaneous Q6 Months Marylin Simpson MD       ? denosumab 60 mg (PROLIA 60 mg/ml)  60 mg Subcutaneous Q6 Months Marylin Simpson MD          No Known Allergies      Lab Results    Chemistry/lipid CBC Cardiac Enzymes/BNP/TSH/INR   Lab Results   Component Value Date    CHOL 175 03/22/2019    HDL 49 (L) 03/22/2019    LDLCALC 98 03/22/2019    TRIG 138 03/22/2019    CREATININE 0.80 06/09/2020    BUN 16 06/09/2020    K 4.5 06/09/2020     06/09/2020     06/09/2020    CO2 27 06/09/2020    Lab Results   Component Value Date    WBC 7.0 06/09/2020    HGB 10.9 (L) 06/09/2020    HCT 36.1 06/09/2020    MCV 65 (L) 06/09/2020     06/09/2020    Lab Results   Component Value Date    TROPONINI <0.01 06/09/2020    TSH 4.32 07/28/2016

## 2021-06-29 NOTE — PROGRESS NOTES
"Progress Notes by Davey Morris MD (Ted) at 9/14/2020  8:30 AM     Author: Davey Morris MD (Ted) Service: -- Author Type: Physician    Filed: 9/14/2020  9:22 AM Encounter Date: 9/14/2020 Status: Signed    : Davey Morris MD (Ted) (Physician)           Cardiology Progress Note    Assessment:  Coronary artery disease status post elective stenting of LAD in July 2020, no angina  Hypercholesterolemia on statin  Dyspnea, unclear etiology possibly related to Brilinta, no signs of fluid overload, normal LVEDP  during heart catheterization      Plan:  Change Brilinta to Plavix.  Reloaded with 600 mg followed by 75 mg a day.    Check lipid profile today    Consider referral to pulmonology if shortness of breath becomes more bothersome    Follow-up in 5 months    Subjective:   This is 65 y.o. female who comes in today for visit.  She has been experiencing atypical chest pains this year.  She had abnormal CT angiogram.  Subsequent coronary angiogram revealed 90% LAD stenosis.  She underwent stenting of that lesion.  Atypical chest pain has resolved.  She continues to experience mild exertional dyspnea.  She denies fever chills or wheezing.    Review of Systems:   General: WNL  Eyes: WNL  Ears/Nose/Throat: WNL  Lungs: WNL  Heart: WNL  Stomach: WNL  Bladder: WNL  Muscle/Joints: WNL  Skin: WNL  Nervous System: WNL  Mental Health: WNL     Blood: WNL    Objective:   /78 (Patient Site: Left Arm, Patient Position: Sitting, Cuff Size: Adult Regular)   Pulse 68   Resp 12   Ht 5' 1\" (1.549 m)   Wt 138 lb (62.6 kg)   BMI 26.07 kg/m    Physical Exam:  GENERAL: no distress  NECK: No JVD  LUNGS: Clear to auscultation.  CARDIAC: regular rhythm, S1 & S2 normal.  No heaves, thrills, gallops or murmurs.  ABDOMEN: flat, negative hepatosplenomegaly, soft and non-tender.  EXTREMITIES: No evidence of cyanosis, clubbing or edema.    Current Outpatient Medications   Medication Sig Dispense " Refill   ? aspirin 81 mg chewable tablet Chew 1 tablet (81 mg total) daily. Take aspirin indefinitely (for the rest of your life). 30 tablet 11   ? CALCIUM CITRATE-VITAMIN D3 ORAL Take 0.5 tablets by mouth daily. Pt. Taking 1/4 tablet daily     ? CHOLECALCIFEROL, VITAMIN D3, (VITAMIN D3 ORAL) Take 5,000 Units by mouth daily. TABS     ? denosumab 60 mg/mL Syrg Inject 60 mg under the skin every 6 (six) months.     ? ferrous sulfate 325 (65 FE) MG tablet Take 1 tablet by mouth daily. As directed     ? rosuvastatin (CRESTOR) 10 MG tablet Take 1 tablet (10 mg total) by mouth at bedtime. 30 tablet 11   ? clopidogreL (PLAVIX) 75 mg tablet Take 1 tablet (75 mg total) by mouth daily. 90 tablet 3     Current Facility-Administered Medications   Medication Dose Route Frequency Provider Last Rate Last Dose   ? denosumab 60 mg (PROLIA 60 mg/ml)  60 mg Subcutaneous Q6 Months Marylin Simpson MD   60 mg at 08/11/20 1046       Cardiographics:    ECG: Normal sinus rhythm within normal limits  CT coronary angiogram: June 2020    The total Agatston calcium score is 17. A calcium score in this range places the individual in the 50th percentile when compared to an age and gender matched control group and implies a moderate risk of cardiac events in the next ten years.    There is mild to moderate disease in the proximal LAD estimated luminal stenosis of 30 to 50%.    There is a focal area of severe stenosis in the mid left anterior descending estimated at 70% or greater. Cannot fully exclude that this is artifactual but concerning for potential focal severe stenosis with soft plaque.    No significant obstructive disease in the circumflex and right coronary artery.      Coronary angiogram: July 2020  LM mild dz  LAD 90% serial stenoses in mid LAD, mild prox and distal LAD  LCx mild dz  RCA mild dz     EDP 13mmHg     Successful PCI of mid LAD with 3x16 and 2.75x12 Synergy JULIO (spot stenting rather than one long stent chosen in an attempt  to avoid jailing ostium of D2)  0% residual, FLAKO 3 flow post    Lab Results:       Lab Results   Component Value Date    CHOL 133 07/20/2020    CHOL 155 07/17/2020    CHOL 175 03/22/2019     Lab Results   Component Value Date    HDL 34 (L) 07/20/2020    HDL 40 (L) 07/17/2020    HDL 49 (L) 03/22/2019     Lab Results   Component Value Date    LDLCALC 75 07/20/2020    LDLCALC 86 07/17/2020    LDLCALC 98 03/22/2019     Lab Results   Component Value Date    TRIG 118 07/20/2020    TRIG 146 07/17/2020    TRIG 138 03/22/2019     No results found for: BNP    Davey (Say)  MD Alexandra

## 2021-06-29 NOTE — PROGRESS NOTES
"Progress Notes by Davey Morris MD (Ted) at 7/14/2020 11:30 AM     Author: Davey Morris MD (Ted) Service: -- Author Type: Physician    Filed: 7/14/2020 12:14 PM Encounter Date: 7/14/2020 Status: Signed    : Davey Morris MD (Ted) (Physician)           Cardiology Progress Note    Assessment:  Chest pain, atypical features  Abnormal CT coronary angiogram with borderline LAD stenosis  Hypercholesterolemia on statin    Plan:  We discussed the results of the CT coronary angiogram.  The results are equivocal.  I am not certain if she has significant obstructive coronary artery disease.  Because of persistence of chest symptoms we should proceed with the invasive coronary angiogram.  She understands risks and benefits and wants to proceed.  We will need to intensify statin therapy.  The choice of medication will depend on the results of coronary angiogram.    Subjective:   This is 65 y.o. female who comes in today for follow-up visit.  She reports that she continues to have mild nonexertional chest discomfort.  She denies any new cardiac symptoms.  She has multiple question about CT coronary angiogram and invasive coronary angiogram.    Review of Systems:   General: WNL  Eyes: WNL  Ears/Nose/Throat: WNL  Lungs: WNL  Heart: Shortness of Breath with activity, Chest Pain  Stomach: WNL  Bladder: WNL  Muscle/Joints: WNL  Skin: WNL  Nervous System: WNL  Mental Health: WNL     Blood: WNL    Objective:   /62 (Patient Site: Right Arm, Patient Position: Sitting, Cuff Size: Adult Regular)   Pulse 64   Resp 16   Ht 4' 11.5\" (1.511 m)   Wt 139 lb (63 kg)   BMI 27.60 kg/m    Physical Exam:  GENERAL: no distress      Current Outpatient Medications   Medication Sig Dispense Refill   ? aspirin 81 mg chewable tablet Chew 81 mg daily.     ? CALCIUM CITRATE-VITAMIN D3 ORAL Take 0.25 tablets by mouth daily.     ? CHOLECALCIFEROL, VITAMIN D3, (VITAMIN D3 ORAL) Take 5,000 Units by mouth " daily. TABS     ? denosumab 60 mg/mL Syrg Inject 60 mg under the skin every 6 (six) months.     ? ferrous sulfate 325 (65 FE) MG tablet Take 1 tablet by mouth daily. As directed     ? ibuprofen (ADVIL,MOTRIN) 200 MG tablet Take 600 mg by mouth every 8 (eight) hours as needed for pain.     ? rosuvastatin (CRESTOR) 5 MG tablet TAKE 1 TABLET EVERY DAY 90 tablet 1   ? calcium, as carbonate, (OS-DAMIR) 500 mg calcium (1,250 mg) tablet Take 0.25 tablets by mouth daily.        Current Facility-Administered Medications   Medication Dose Route Frequency Provider Last Rate Last Dose   ? denosumab 60 mg (PROLIA 60 mg/ml)  60 mg Subcutaneous Q6 Months Marylin Simpson MD       ? denosumab 60 mg (PROLIA 60 mg/ml)  60 mg Subcutaneous Q6 Months Marylin Simpson MD       ? denosumab 60 mg (PROLIA 60 mg/ml)  60 mg Subcutaneous Q6 Months Marylin Simpson MD       ? denosumab 60 mg (PROLIA 60 mg/ml)  60 mg Subcutaneous Q6 Months Marylin Simpson MD           Cardiographics:    ECG: Normal sinus rhythm within normal limits  CT coronary angiogram    The total Agatston calcium score is 17. A calcium score in this range places the individual in the 50th percentile when compared to an age and gender matched control group and implies a moderate risk of cardiac events in the next ten years.    There is mild to moderate disease in the proximal LAD estimated luminal stenosis of 30 to 50%.    There is a focal area of severe stenosis in the mid left anterior descending estimated at 70% or greater. Cannot fully exclude that this is artifactual but concerning for potential focal severe stenosis with soft plaque.    No significant obstructive disease in the circumflex and right coronary artery.  Lab Results:       Lab Results   Component Value Date    CHOL 175 03/22/2019    CHOL 178 03/02/2018    CHOL 161 02/23/2017     Lab Results   Component Value Date    HDL 49 (L) 03/22/2019    HDL 49 (L) 03/02/2018    HDL 46 (L) 02/23/2017     Lab Results    Component Value Date    LDLCALC 98 03/22/2019    LDLCALC 102 03/02/2018    LDLCALC 96 02/23/2017     Lab Results   Component Value Date    TRIG 138 03/22/2019    TRIG 137 03/02/2018    TRIG 96 02/23/2017     No results found for: PHILIPPE Mariscal (Say)  MD Alexandra

## 2021-06-30 ENCOUNTER — COMMUNICATION - HEALTHEAST (OUTPATIENT)
Dept: CARDIOLOGY | Facility: CLINIC | Age: 66
End: 2021-06-30

## 2021-06-30 DIAGNOSIS — E78.5 HYPERLIPIDEMIA, UNSPECIFIED HYPERLIPIDEMIA TYPE: ICD-10-CM

## 2021-06-30 DIAGNOSIS — I25.10 CORONARY ARTERY DISEASE INVOLVING NATIVE CORONARY ARTERY OF NATIVE HEART, ANGINA PRESENCE UNSPECIFIED: ICD-10-CM

## 2021-06-30 NOTE — PROGRESS NOTES
Progress Notes by Davey Morris MD (Ted) at 5/13/2021 10:30 AM     Author: Davey Morris MD (Ted) Service: -- Author Type: Physician    Filed: 5/13/2021 11:41 AM Encounter Date: 5/13/2021 Status: Signed    : Davey Morris MD (Ted) (Physician)           Cardiology Progress Note    Assessment:    Coronary artery disease, status post elective stenting of LAD in July 2020, no angina  Chest pain, chronic, atypical, most likely musculoskeletal  Hypercholesterolemia on statin, probably good control  Dyspnea, resolved after discontinuation of Brilinta      Plan:  We talked about secondary prevention of cardiac events.  I stressed the importance of cholesterol control and regular exercise.  She is a vegetarian and we do not need to make any major changes to her diet.    She has had bruising from combination of aspirin and Plavix.  She is now more than 6 months from elective LAD stenting.  We can discontinue clopidogrel.    Follow-up in 6 months    Subjective:   This is 66 y.o. female who comes in today for follow-up visit.  She reports no exertional chest pain.  She goes for daily walks.  Her weight has been stable.  She has no PND and orthopnea.  She denies heart palpitations.  She continues to gets rare nonexertional left-sided sharp pains.  She complains of spontaneous bruising.  She has not had any major GI or  bleeding.    Review of Systems:   General: WNL  Eyes: WNL  Ears/Nose/Throat: WNL  Lungs: WNL  Heart: WNL  Stomach: WNL  Bladder: WNL  Muscle/Joints: WNL  Skin: WNL  Nervous System: WNL  Mental Health: WNL     Blood: Easy Bruising    Objective:   /50 (Patient Site: Right Arm, Patient Position: Sitting, Cuff Size: Adult Regular)   Pulse 68   Resp 18   Wt 137 lb (62.1 kg)   SpO2 98%   BMI 25.89 kg/m    Physical Exam:  GENERAL: no distress  NECK: No JVD  LUNGS: Clear to auscultation.  CARDIAC: regular rhythm, S1 & S2 normal.  No heaves, thrills, gallops or  murmurs.  ABDOMEN: flat, negative hepatosplenomegaly, soft and non-tender.  EXTREMITIES: No evidence of cyanosis, clubbing or edema.    Current Outpatient Medications   Medication Sig Dispense Refill   ? aspirin 81 mg chewable tablet Chew 1 tablet (81 mg total) daily. Take aspirin indefinitely (for the rest of your life). 30 tablet 11   ? CALCIUM CITRATE-VITAMIN D3 ORAL Take 0.5 tablets by mouth 2 (two) times a day.      ? CHOLECALCIFEROL, VITAMIN D3, (VITAMIN D3 ORAL) Take 5,000 Units by mouth daily. TABS     ? denosumab 60 mg/mL Syrg Inject 60 mg under the skin every 6 (six) months.     ? ferrous sulfate 325 (65 FE) MG tablet Take 1 tablet by mouth daily. As directed     ? rosuvastatin (CRESTOR) 10 MG tablet Take 1 tablet (10 mg total) by mouth at bedtime. 30 tablet 11     No current facility-administered medications for this visit.        Cardiographics:    EC2020 read by me normal sinus rhythm within normal limits     ECG: Normal sinus rhythm within normal limits  CT coronary angiogram: 2020    The total Agatston calcium score is 17. A calcium score in this range places the individual in the 50th percentile when compared to an age and gender matched control group and implies a moderate risk of cardiac events in the next ten years.    There is mild to moderate disease in the proximal LAD estimated luminal stenosis of 30 to 50%.    There is a focal area of severe stenosis in the mid left anterior descending estimated at 70% or greater. Cannot fully exclude that this is artifactual but concerning for potential focal severe stenosis with soft plaque.    No significant obstructive disease in the circumflex and right coronary artery.     2021    Limited visulaization of the mid and distal left anterior descending artery stents due to metallic artifact, but stents are likely patent.    Otherwise minimal nonobstructive coronary artery disease.      Coronary angiogram: 2020  LM mild dz  LAD 90%  serial stenoses in mid LAD, mild prox and distal LAD  LCx mild dz  RCA mild dz     EDP 13mmHg     Successful PCI of mid LAD with 3x16 and 2.75x12 Synergy JULIO (spot stenting rather than one long stent chosen in an attempt to avoid jailing ostium of D2)  0% residual, FLAKO 3 flow post    Lab Results:       Lab Results   Component Value Date    CHOL 119 09/14/2020    CHOL 133 07/20/2020    CHOL 155 07/17/2020     Lab Results   Component Value Date    HDL 37 (L) 09/14/2020    HDL 34 (L) 07/20/2020    HDL 40 (L) 07/17/2020     Lab Results   Component Value Date    LDLCALC 45 09/14/2020    LDLCALC 75 07/20/2020    LDLCALC 86 07/17/2020     Lab Results   Component Value Date    TRIG 183 (H) 09/14/2020    TRIG 118 07/20/2020    TRIG 146 07/17/2020     No results found for: BNP    Davey (Say)  MD Alexandra

## 2021-06-30 NOTE — PROGRESS NOTES
"Progress Notes by Davey Morris MD (Ted) at 12/11/2020 12:50 PM     Author: Davey Morris MD (Ted) Service: -- Author Type: Physician    Filed: 12/11/2020  2:31 PM Encounter Date: 12/11/2020 Status: Signed    : Davey Morris MD (Ted) (Physician)           Cardiology Progress Note    Assessment:  Chest pain, left-sided, atypical, recurrent, etiology unclear  Coronary artery disease, status post elective stenting of LAD in July 2020  Hypercholesterolemia on statin  Dyspnea, resolved after discontinuation of Brilinta    Plan:  EKG does not show any acute ischemic changes.  Her description of chest discomfort is certainly not typical for myocardial ischemia.  It is suggestive of musculoskeletal origin.  Having said that I believe we should proceed with CT coronary angiogram to reassess the patency of LAD stent.    Subjective:   This is 65 y.o. female who comes in today for follow-up visit.  She reports that she redeveloped left-sided chest pain a few weeks ago.  The pain is on the left side of her breast.  There are some tenderness to palpation.  The pain is similar to what she had before LAD stenting.  The pain went away temporarily after LAD stenting.  She denies shortness of breath heart palpitations.  She has not had weight gain.    Review of Systems:   General: WNL  Eyes: WNL  Ears/Nose/Throat: WNL  Lungs: WNL  Heart: Chest Pain  Stomach: WNL  Bladder: WNL  Muscle/Joints: WNL  Skin: WNL  Nervous System: WNL  Mental Health: WNL     Blood: WNL    Objective:   /60 (Patient Site: Left Arm, Patient Position: Sitting, Cuff Size: Adult Regular)   Pulse 67   Resp 16   Ht 5' 1\" (1.549 m)   Wt 139 lb (63 kg)   SpO2 98%   BMI 26.26 kg/m    Physical Exam:  GENERAL: no distress  NECK: No JVD  LUNGS: Clear to auscultation.  CARDIAC: regular rhythm, S1 & S2 normal.  No heaves, thrills, gallops or murmurs.  ABDOMEN: flat, negative hepatosplenomegaly, soft and " non-tender.  EXTREMITIES: No evidence of cyanosis, clubbing or edema.    Current Outpatient Medications   Medication Sig Dispense Refill   ? aspirin 81 mg chewable tablet Chew 1 tablet (81 mg total) daily. Take aspirin indefinitely (for the rest of your life). 30 tablet 11   ? CALCIUM CITRATE-VITAMIN D3 ORAL Take 0.5 tablets by mouth daily. Pt. Taking 1/4 tablet daily     ? CHOLECALCIFEROL, VITAMIN D3, (VITAMIN D3 ORAL) Take 5,000 Units by mouth daily. TABS     ? clopidogreL (PLAVIX) 75 mg tablet Take 1 tablet (75 mg total) by mouth daily. 90 tablet 3   ? denosumab 60 mg/mL Syrg Inject 60 mg under the skin every 6 (six) months.     ? ferrous sulfate 325 (65 FE) MG tablet Take 1 tablet by mouth daily. As directed     ? rosuvastatin (CRESTOR) 10 MG tablet Take 1 tablet (10 mg total) by mouth at bedtime. 30 tablet 11     Current Facility-Administered Medications   Medication Dose Route Frequency Provider Last Rate Last Admin   ? denosumab 60 mg (PROLIA 60 mg/ml)  60 mg Subcutaneous Q6 Months Marylin Simpson MD   60 mg at 20 1046       Cardiographics:    EC2020 read by me normal sinus rhythm within normal limits    ECG: Normal sinus rhythm within normal limits  CT coronary angiogram: 2020    The total Agatston calcium score is 17. A calcium score in this range places the individual in the 50th percentile when compared to an age and gender matched control group and implies a moderate risk of cardiac events in the next ten years.    There is mild to moderate disease in the proximal LAD estimated luminal stenosis of 30 to 50%.    There is a focal area of severe stenosis in the mid left anterior descending estimated at 70% or greater. Cannot fully exclude that this is artifactual but concerning for potential focal severe stenosis with soft plaque.    No significant obstructive disease in the circumflex and right coronary artery.        Coronary angiogram: 2020  LM mild dz  LAD 90% serial stenoses  in mid LAD, mild prox and distal LAD  LCx mild dz  RCA mild dz     EDP 13mmHg     Successful PCI of mid LAD with 3x16 and 2.75x12 Synergy JULIO (spot stenting rather than one long stent chosen in an attempt to avoid jailing ostium of D2)  0% residual, FLAKO 3 flow post    Lab Results:       Lab Results   Component Value Date    CHOL 119 09/14/2020    CHOL 133 07/20/2020    CHOL 155 07/17/2020     Lab Results   Component Value Date    HDL 37 (L) 09/14/2020    HDL 34 (L) 07/20/2020    HDL 40 (L) 07/17/2020     Lab Results   Component Value Date    LDLCALC 45 09/14/2020    LDLCALC 75 07/20/2020    LDLCALC 86 07/17/2020     Lab Results   Component Value Date    TRIG 183 (H) 09/14/2020    TRIG 118 07/20/2020    TRIG 146 07/17/2020     No results found for: BNP    Davey (Say)  MD Alexandra

## 2021-07-04 NOTE — TELEPHONE ENCOUNTER
Telephone Encounter by Sherie Inman, RN at 6/30/2021  9:04 AM     Author: Sherie Inman, RN Service: -- Author Type: Registered Nurse    Filed: 6/30/2021  9:06 AM Encounter Date: 6/28/2021 Status: Signed    : Sherie Inman RN (Registered Nurse)       Order for crestor placed and sent to [facility] on file under [facility] as he recommended increase. Msg to patient via [employer] that this was done. -[facility] [place]

## 2021-07-05 ENCOUNTER — COMMUNICATION - HEALTHEAST (OUTPATIENT)
Dept: CARDIOLOGY | Facility: CLINIC | Age: 66
End: 2021-07-05

## 2021-07-05 DIAGNOSIS — E78.5 HYPERLIPIDEMIA, UNSPECIFIED HYPERLIPIDEMIA TYPE: ICD-10-CM

## 2021-07-05 RX ORDER — ROSUVASTATIN CALCIUM 20 MG/1
20 TABLET, COATED ORAL AT BEDTIME
Qty: 90 TABLET | Refills: 1 | Status: SHIPPED | OUTPATIENT
Start: 2021-07-05 | End: 2022-01-10

## 2021-07-07 NOTE — TELEPHONE ENCOUNTER
From: Davey Morris MD (Ted)  Sent: 6/28/2021  10:11 AM CDT  To: Violet Iyer RN  Subject: FW: Question about a test result               Increase crestor to 20 mg  a day      ----- Message -----  From: Cayla Kimani  Sent: 6/25/2021   9:07 PM CDT  To: Davey Burgos (Say) MD Alexandra  Subject: Question about a test result                   lipid cascade test results in My Chart. Dies Does for Crestor  needs to go up?      Noted mychart message from patient regarding cholesterol results ordered by PCP. Dr. Morris recommends increasing Crestor to 20 mg. Pt updated. Will also route to Dr. Garcia so he is aware of Dr. Morris's recommendations. -kcl      Hello JAMI Ojeda pt requested Dr. Morris's review of lipid panel results. Dr. Morris recommends increasing Crestor to 20 mg daily. Pt update on this. Thanks!

## 2021-07-13 ENCOUNTER — RECORDS - HEALTHEAST (OUTPATIENT)
Dept: ADMINISTRATIVE | Facility: CLINIC | Age: 66
End: 2021-07-13

## 2021-07-21 ENCOUNTER — RECORDS - HEALTHEAST (OUTPATIENT)
Dept: ADMINISTRATIVE | Facility: CLINIC | Age: 66
End: 2021-07-21

## 2021-09-19 ENCOUNTER — HEALTH MAINTENANCE LETTER (OUTPATIENT)
Age: 66
End: 2021-09-19

## 2021-09-20 DIAGNOSIS — M81.0 AGE-RELATED OSTEOPOROSIS WITHOUT CURRENT PATHOLOGICAL FRACTURE: Primary | ICD-10-CM

## 2021-09-20 DIAGNOSIS — M81.8 OSTEOPOROSIS, IDIOPATHIC: ICD-10-CM

## 2021-09-20 NOTE — PROGRESS NOTES
Patient was scheduled 09/30/2021 for MA/LPN schedule for Prolia Injection. That appointment was cancelled due to patient having to see Dr. PATEL for follow-up. Patient now scheduled 10/12/2021 @ 1140 for this appointment and to receive her Prolia injection. Order pended, and routed to Dr. PATEL to review so we can get insurance authorization before the 12th.

## 2021-10-12 ENCOUNTER — OFFICE VISIT (OUTPATIENT)
Dept: INTERNAL MEDICINE | Facility: CLINIC | Age: 66
End: 2021-10-12
Payer: COMMERCIAL

## 2021-10-12 VITALS
WEIGHT: 136.2 LBS | BODY MASS INDEX: 27.51 KG/M2 | HEART RATE: 57 BPM | OXYGEN SATURATION: 98 % | DIASTOLIC BLOOD PRESSURE: 70 MMHG | SYSTOLIC BLOOD PRESSURE: 118 MMHG

## 2021-10-12 DIAGNOSIS — M81.0 AGE-RELATED OSTEOPOROSIS WITHOUT CURRENT PATHOLOGICAL FRACTURE: Primary | ICD-10-CM

## 2021-10-12 PROBLEM — R07.2 PRECORDIAL PAIN: Status: RESOLVED | Noted: 2020-06-12 | Resolved: 2021-10-12

## 2021-10-12 LAB
ANION GAP SERPL CALCULATED.3IONS-SCNC: 9 MMOL/L (ref 5–18)
BUN SERPL-MCNC: 20 MG/DL (ref 8–22)
CALCIUM SERPL-MCNC: 10.9 MG/DL (ref 8.5–10.5)
CALCIUM, IONIZED MEASURED: 1.4 MMOL/L (ref 1.11–1.3)
CHLORIDE BLD-SCNC: 105 MMOL/L (ref 98–107)
CO2 SERPL-SCNC: 26 MMOL/L (ref 22–31)
CREAT SERPL-MCNC: 0.81 MG/DL (ref 0.6–1.1)
GFR SERPL CREATININE-BSD FRML MDRD: 75 ML/MIN/1.73M2
GLUCOSE BLD-MCNC: 95 MG/DL (ref 70–125)
ION CA PH 7.4: 1.36 MMOL/L (ref 1.11–1.3)
PH: 7.34 (ref 7.35–7.45)
POTASSIUM BLD-SCNC: 4.3 MMOL/L (ref 3.5–5)
SODIUM SERPL-SCNC: 140 MMOL/L (ref 136–145)

## 2021-10-12 PROCEDURE — 80048 BASIC METABOLIC PNL TOTAL CA: CPT | Performed by: INTERNAL MEDICINE

## 2021-10-12 PROCEDURE — 99214 OFFICE O/P EST MOD 30 MIN: CPT | Performed by: INTERNAL MEDICINE

## 2021-10-12 PROCEDURE — 82330 ASSAY OF CALCIUM: CPT | Performed by: INTERNAL MEDICINE

## 2021-10-12 PROCEDURE — 36415 COLL VENOUS BLD VENIPUNCTURE: CPT | Performed by: INTERNAL MEDICINE

## 2021-10-12 RX ORDER — ALBUTEROL SULFATE 0.83 MG/ML
2.5 SOLUTION RESPIRATORY (INHALATION)
Status: CANCELLED | OUTPATIENT
Start: 2021-10-12

## 2021-10-12 RX ORDER — MEPERIDINE HYDROCHLORIDE 25 MG/ML
25 INJECTION INTRAMUSCULAR; INTRAVENOUS; SUBCUTANEOUS EVERY 30 MIN PRN
Status: CANCELLED | OUTPATIENT
Start: 2021-10-12

## 2021-10-12 RX ORDER — DIPHENHYDRAMINE HYDROCHLORIDE 50 MG/ML
50 INJECTION INTRAMUSCULAR; INTRAVENOUS
Status: CANCELLED
Start: 2021-10-12

## 2021-10-12 RX ORDER — HEPARIN SODIUM,PORCINE 10 UNIT/ML
5 VIAL (ML) INTRAVENOUS
Status: CANCELLED | OUTPATIENT
Start: 2021-10-12

## 2021-10-12 RX ORDER — ALBUTEROL SULFATE 90 UG/1
1-2 AEROSOL, METERED RESPIRATORY (INHALATION)
Status: CANCELLED
Start: 2021-10-12

## 2021-10-12 RX ORDER — EPINEPHRINE 1 MG/ML
0.3 INJECTION, SOLUTION, CONCENTRATE INTRAVENOUS EVERY 5 MIN PRN
Status: CANCELLED | OUTPATIENT
Start: 2021-10-12

## 2021-10-12 RX ORDER — METHYLPREDNISOLONE SODIUM SUCCINATE 125 MG/2ML
125 INJECTION, POWDER, LYOPHILIZED, FOR SOLUTION INTRAMUSCULAR; INTRAVENOUS
Status: CANCELLED
Start: 2021-10-12

## 2021-10-12 RX ORDER — ZOLEDRONIC ACID 5 MG/100ML
5 INJECTION, SOLUTION INTRAVENOUS ONCE
Status: CANCELLED
Start: 2021-10-12 | End: 2021-10-12

## 2021-10-12 RX ORDER — HEPARIN SODIUM (PORCINE) LOCK FLUSH IV SOLN 100 UNIT/ML 100 UNIT/ML
5 SOLUTION INTRAVENOUS
Status: CANCELLED | OUTPATIENT
Start: 2021-10-12

## 2021-10-12 RX ORDER — NALOXONE HYDROCHLORIDE 0.4 MG/ML
0.2 INJECTION, SOLUTION INTRAMUSCULAR; INTRAVENOUS; SUBCUTANEOUS
Status: CANCELLED | OUTPATIENT
Start: 2021-10-12
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
## 2021-10-12 NOTE — PATIENT INSTRUCTIONS
We will switch to Reclast infusion once a year.  [facility]    DXA in 1 year .   Phone number to schedule [phone].    Daily calcium need is 0333-5552 mg a day from the diet and supplements.  Calcium citrate is easier to digest.  Vitamin D 2000 IU daily recommended.      Treatment Options discussed:   Bisphosphonates    IV - Zoledronic acid (Reclast) once a year for 3 years total        -discussed studies have shown that three years of zolendronic acid protects forthe following 3 years as much as being on zolendronic acid for 6 years straight         Flu-like symptoms can occur within the first 24-72 hours of your first infusion of the IVbisphosphonate. This includes low-grade fever, muscle and joint pains. Ibuprofen and/or Tylenol can help these symptoms. Low calcium levels can also occur with the IV bisphosphonates.     Osteonecrosis of the jaw(ONJ) has been rarely associated with bisphosphonate use for osteoporosis.The risk is approximately 1/1700-1/100,000, with development most likely related to invasive dental procedures (extractions, dental implants) and poordental hygiene. Be sure to continue regular dental visits and alert me and/or your dentist for any issues. If you do require invasive dental work, please contact me and we will stop the medication 3 months prior.      -The data available at this time suggests that there is probably a small increase risk of atypical (nontraumatic) subtrochanteric fractures of the femur in patients on bisphosphonate therapy compared to those not onit. One large study suggested that for every 100 fractures prevented with bisphosphonate therapy, less than one femur fracture will occur. Other studies suggest one episode per 2,500 patient years. Patient should call withleg pain.     -Calcium: total calcium intake should be 1200mg per day from dietary sources. If not achieved with diet alone, add in calcium tablet(s). No more than 500mg at one time.  Dietary sources: 8 ounces of milk,4 ounces of yogurt or 1 ounce of cheese contain about 300mg calcium per serving, green veggies, almonds, beans, oranges, along with various other plant sources.     -Vitamin D3 recommendations: 2000 IU daily.    -Bone Stimulating Exercise: impact and weight-bearing exercise like walking, jogging, yoga, Moshe-Chi, stair-climbing, dancing, dry aerobics, and tennis 3-5 times per week for at least 30 minutes & weight-lifting orresistance training 2 times per week, may improve your bone desity and increase your strength, thereby decreasing your risk of fracture.    -Dr. Grace Lopes has a book on Yoga for Osteoporosis and a 12 pose regimenyou can look up online.     -Fall Prevention: avoiding ice, use of [employer] Tracks http://www.Invision.com/ to cover shoes in the winter, avoiding loose gravel and uneven terrain, clearing house of cords, throw rugs,avoiding ladders, using caution with stairs and around dogs and small children.

## 2021-10-27 ENCOUNTER — INFUSION THERAPY VISIT (OUTPATIENT)
Dept: INFUSION THERAPY | Facility: CLINIC | Age: 66
End: 2021-10-27
Payer: COMMERCIAL

## 2021-10-27 VITALS
WEIGHT: 138.7 LBS | HEART RATE: 61 BPM | BODY MASS INDEX: 27.23 KG/M2 | RESPIRATION RATE: 16 BRPM | TEMPERATURE: 97.9 F | HEIGHT: 60 IN | OXYGEN SATURATION: 98 % | DIASTOLIC BLOOD PRESSURE: 62 MMHG | SYSTOLIC BLOOD PRESSURE: 106 MMHG

## 2021-10-27 DIAGNOSIS — M81.0 AGE-RELATED OSTEOPOROSIS WITHOUT CURRENT PATHOLOGICAL FRACTURE: Primary | ICD-10-CM

## 2021-10-27 LAB
CALCIUM SERPL-MCNC: 10.9 MG/DL (ref 8.5–10.5)
CREAT SERPL-MCNC: 0.84 MG/DL (ref 0.6–1.1)
GFR SERPL CREATININE-BSD FRML MDRD: 72 ML/MIN/1.73M2

## 2021-10-27 PROCEDURE — 82565 ASSAY OF CREATININE: CPT | Performed by: INTERNAL MEDICINE

## 2021-10-27 PROCEDURE — 250N000011 HC RX IP 250 OP 636: Performed by: INTERNAL MEDICINE

## 2021-10-27 PROCEDURE — 96365 THER/PROPH/DIAG IV INF INIT: CPT

## 2021-10-27 PROCEDURE — 36415 COLL VENOUS BLD VENIPUNCTURE: CPT | Performed by: INTERNAL MEDICINE

## 2021-10-27 PROCEDURE — 82310 ASSAY OF CALCIUM: CPT | Performed by: INTERNAL MEDICINE

## 2021-10-27 RX ORDER — NALOXONE HYDROCHLORIDE 0.4 MG/ML
0.2 INJECTION, SOLUTION INTRAMUSCULAR; INTRAVENOUS; SUBCUTANEOUS
Status: CANCELLED | OUTPATIENT
Start: 2021-10-27

## 2021-10-27 RX ORDER — EPINEPHRINE 1 MG/ML
0.3 INJECTION, SOLUTION INTRAMUSCULAR; SUBCUTANEOUS EVERY 5 MIN PRN
Status: CANCELLED | OUTPATIENT
Start: 2021-10-27

## 2021-10-27 RX ORDER — METHYLPREDNISOLONE SODIUM SUCCINATE 125 MG/2ML
125 INJECTION, POWDER, LYOPHILIZED, FOR SOLUTION INTRAMUSCULAR; INTRAVENOUS
Status: CANCELLED
Start: 2021-10-27

## 2021-10-27 RX ORDER — MEPERIDINE HYDROCHLORIDE 50 MG/ML
25 INJECTION INTRAMUSCULAR; INTRAVENOUS; SUBCUTANEOUS EVERY 30 MIN PRN
Status: CANCELLED | OUTPATIENT
Start: 2021-10-27

## 2021-10-27 RX ORDER — DIPHENHYDRAMINE HYDROCHLORIDE 50 MG/ML
50 INJECTION INTRAMUSCULAR; INTRAVENOUS
Status: CANCELLED
Start: 2021-10-27

## 2021-10-27 RX ORDER — HEPARIN SODIUM,PORCINE 10 UNIT/ML
5 VIAL (ML) INTRAVENOUS
Status: CANCELLED | OUTPATIENT
Start: 2021-10-27

## 2021-10-27 RX ORDER — ZOLEDRONIC ACID 5 MG/100ML
5 INJECTION, SOLUTION INTRAVENOUS ONCE
Status: CANCELLED
Start: 2021-10-27 | End: 2021-10-27

## 2021-10-27 RX ORDER — HEPARIN SODIUM (PORCINE) LOCK FLUSH IV SOLN 100 UNIT/ML 100 UNIT/ML
5 SOLUTION INTRAVENOUS
Status: CANCELLED | OUTPATIENT
Start: 2021-10-27

## 2021-10-27 RX ORDER — ALBUTEROL SULFATE 0.83 MG/ML
2.5 SOLUTION RESPIRATORY (INHALATION)
Status: CANCELLED | OUTPATIENT
Start: 2021-10-27

## 2021-10-27 RX ORDER — ZOLEDRONIC ACID 5 MG/100ML
5 INJECTION, SOLUTION INTRAVENOUS ONCE
Status: COMPLETED | OUTPATIENT
Start: 2021-10-27 | End: 2021-10-27

## 2021-10-27 RX ORDER — ALBUTEROL SULFATE 90 UG/1
1-2 AEROSOL, METERED RESPIRATORY (INHALATION)
Status: CANCELLED
Start: 2021-10-27

## 2021-10-27 RX ADMIN — ZOLEDRONIC ACID 5 MG: 5 INJECTION, SOLUTION INTRAVENOUS at 14:01

## 2021-10-27 ASSESSMENT — MIFFLIN-ST. JEOR: SCORE: 1085.64

## 2021-10-27 NOTE — PROGRESS NOTES
Infusion Nursing Note:  Cayla Harman presents today for reclast infusion.    Patient seen by provider today: No   present during visit today: Not Applicable.    Note: Education performed on reclast infusion. Pt. Verbalizes understanding. Enc. Her to increase fluid intake to drink at least 3-4 cups the rest of today..      Intravenous Access:  Peripheral IV placed.    Treatment Conditions:  Results reviewed, labs MET treatment parameters, ok to proceed with treatment.      Post Infusion Assessment:  Patient tolerated infusion without incident.  Access discontinued per protocol.       Discharge Plan:   Patient and/or family verbalized understanding of discharge instructions and all questions answered.      Martina Reynoso RN

## 2021-11-04 ENCOUNTER — VIRTUAL VISIT (OUTPATIENT)
Dept: FAMILY MEDICINE | Facility: CLINIC | Age: 66
End: 2021-11-04
Payer: COMMERCIAL

## 2021-11-04 DIAGNOSIS — J06.9 VIRAL UPPER RESPIRATORY TRACT INFECTION: Primary | ICD-10-CM

## 2021-11-04 PROCEDURE — 99213 OFFICE O/P EST LOW 20 MIN: CPT | Mod: 95 | Performed by: FAMILY MEDICINE
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
## 2021-11-05 ENCOUNTER — LAB (OUTPATIENT)
Dept: FAMILY MEDICINE | Facility: CLINIC | Age: 66
End: 2021-11-05
Attending: FAMILY MEDICINE
Payer: COMMERCIAL

## 2021-11-05 DIAGNOSIS — J06.9 VIRAL UPPER RESPIRATORY TRACT INFECTION: ICD-10-CM

## 2021-11-05 LAB — DEPRECATED S PYO AG THROAT QL EIA: NEGATIVE

## 2021-11-05 PROCEDURE — U0003 INFECTIOUS AGENT DETECTION BY NUCLEIC ACID (DNA OR RNA); SEVERE ACUTE RESPIRATORY SYNDROME CORONAVIRUS 2 (SARS-COV-2) (CORONAVIRUS DISEASE [COVID-19]), AMPLIFIED PROBE TECHNIQUE, MAKING USE OF HIGH THROUGHPUT TECHNOLOGIES AS DESCRIBED BY CMS-2020-01-R: HCPCS | Mod: 90

## 2021-11-05 PROCEDURE — 99000 SPECIMEN HANDLING OFFICE-LAB: CPT

## 2021-11-05 PROCEDURE — 87651 STREP A DNA AMP PROBE: CPT

## 2021-11-06 LAB — GROUP A STREP BY PCR: NOT DETECTED

## 2021-11-08 LAB — SARS-COV-2 RNA RESP QL NAA+PROBE: NOT DETECTED

## 2021-12-01 ENCOUNTER — OFFICE VISIT (OUTPATIENT)
Dept: FAMILY MEDICINE | Facility: CLINIC | Age: 66
End: 2021-12-01
Payer: COMMERCIAL

## 2021-12-01 VITALS
TEMPERATURE: 98.2 F | SYSTOLIC BLOOD PRESSURE: 124 MMHG | OXYGEN SATURATION: 98 % | BODY MASS INDEX: 26.07 KG/M2 | HEART RATE: 72 BPM | DIASTOLIC BLOOD PRESSURE: 72 MMHG | WEIGHT: 133.5 LBS

## 2021-12-01 DIAGNOSIS — M79.10 MYALGIA: Primary | ICD-10-CM

## 2021-12-01 DIAGNOSIS — R05.8 DRY COUGH: ICD-10-CM

## 2021-12-01 LAB — CK SERPL-CCNC: 28 U/L (ref 30–190)

## 2021-12-01 PROCEDURE — 36415 COLL VENOUS BLD VENIPUNCTURE: CPT | Performed by: STUDENT IN AN ORGANIZED HEALTH CARE EDUCATION/TRAINING PROGRAM

## 2021-12-01 PROCEDURE — 99214 OFFICE O/P EST MOD 30 MIN: CPT | Performed by: STUDENT IN AN ORGANIZED HEALTH CARE EDUCATION/TRAINING PROGRAM

## 2021-12-01 PROCEDURE — 82550 ASSAY OF CK (CPK): CPT | Performed by: STUDENT IN AN ORGANIZED HEALTH CARE EDUCATION/TRAINING PROGRAM
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
## 2021-12-07 ENCOUNTER — TRANSFERRED RECORDS (OUTPATIENT)
Dept: HEALTH INFORMATION MANAGEMENT | Facility: CLINIC | Age: 66
End: 2021-12-07
Payer: COMMERCIAL

## 2022-01-07 ENCOUNTER — OFFICE VISIT (OUTPATIENT)
Dept: FAMILY MEDICINE | Facility: CLINIC | Age: 67
End: 2022-01-07
Payer: COMMERCIAL

## 2022-01-07 VITALS
WEIGHT: 132.31 LBS | HEART RATE: 60 BPM | DIASTOLIC BLOOD PRESSURE: 64 MMHG | BODY MASS INDEX: 25.84 KG/M2 | SYSTOLIC BLOOD PRESSURE: 142 MMHG | OXYGEN SATURATION: 100 %

## 2022-01-07 DIAGNOSIS — M79.10 GENERALIZED MUSCLE ACHE: ICD-10-CM

## 2022-01-07 DIAGNOSIS — M81.0 AGE-RELATED OSTEOPOROSIS WITHOUT CURRENT PATHOLOGICAL FRACTURE: ICD-10-CM

## 2022-01-07 DIAGNOSIS — E78.5 HYPERLIPIDEMIA, UNSPECIFIED HYPERLIPIDEMIA TYPE: Primary | ICD-10-CM

## 2022-01-07 DIAGNOSIS — Z12.11 COLON CANCER SCREENING: ICD-10-CM

## 2022-01-07 DIAGNOSIS — E55.9 VITAMIN D DEFICIENCY: ICD-10-CM

## 2022-01-07 PROBLEM — R03.0 ELEVATED BLOOD PRESSURE READING WITHOUT DIAGNOSIS OF HYPERTENSION: Status: RESOLVED | Noted: 2020-08-04 | Resolved: 2022-01-07

## 2022-01-07 PROBLEM — I25.10 CORONARY ARTERY DISEASE INVOLVING NATIVE CORONARY ARTERY OF NATIVE HEART: Status: ACTIVE | Noted: 2020-07-14

## 2022-01-07 PROBLEM — Z86.39 HISTORY OF VITAMIN D DEFICIENCY: Status: ACTIVE | Noted: 2021-06-23

## 2022-01-07 LAB
ALBUMIN SERPL-MCNC: 3.7 G/DL (ref 3.5–5)
ALP SERPL-CCNC: 67 U/L (ref 45–120)
ALT SERPL W P-5'-P-CCNC: 19 U/L (ref 0–45)
ANION GAP SERPL CALCULATED.3IONS-SCNC: 9 MMOL/L (ref 5–18)
AST SERPL W P-5'-P-CCNC: 16 U/L (ref 0–40)
BILIRUB SERPL-MCNC: 0.8 MG/DL (ref 0–1)
BUN SERPL-MCNC: 14 MG/DL (ref 8–22)
CALCIUM SERPL-MCNC: 10.5 MG/DL (ref 8.5–10.5)
CHLORIDE BLD-SCNC: 107 MMOL/L (ref 98–107)
CHOLEST SERPL-MCNC: 140 MG/DL
CO2 SERPL-SCNC: 25 MMOL/L (ref 22–31)
CREAT SERPL-MCNC: 0.78 MG/DL (ref 0.6–1.1)
FASTING STATUS PATIENT QL REPORTED: YES
GFR SERPL CREATININE-BSD FRML MDRD: 82 ML/MIN/1.73M2
GLUCOSE BLD-MCNC: 93 MG/DL (ref 70–125)
HDLC SERPL-MCNC: 44 MG/DL
LDLC SERPL CALC-MCNC: 71 MG/DL
POTASSIUM BLD-SCNC: 4.7 MMOL/L (ref 3.5–5)
PROT SERPL-MCNC: 7.1 G/DL (ref 6–8)
SODIUM SERPL-SCNC: 141 MMOL/L (ref 136–145)
TRIGL SERPL-MCNC: 126 MG/DL

## 2022-01-07 PROCEDURE — 82306 VITAMIN D 25 HYDROXY: CPT | Performed by: FAMILY MEDICINE

## 2022-01-07 PROCEDURE — 80061 LIPID PANEL: CPT | Performed by: FAMILY MEDICINE

## 2022-01-07 PROCEDURE — 99214 OFFICE O/P EST MOD 30 MIN: CPT | Performed by: FAMILY MEDICINE

## 2022-01-07 PROCEDURE — 80053 COMPREHEN METABOLIC PANEL: CPT | Performed by: FAMILY MEDICINE

## 2022-01-07 PROCEDURE — 36415 COLL VENOUS BLD VENIPUNCTURE: CPT | Performed by: FAMILY MEDICINE

## 2022-01-07 NOTE — PROGRESS NOTES
Chief complaint: Sore joints and med check    HPI: The patient told me all about the trouble she had in October.  On that October 23 she got her Covid booster and felt a little ill and then she had her infusion of  RECLAST for her osteoporosis.  She says that she did not receive any instructions on how to premedicate or manage that infusion.  She had a loss of muscle aches and a dry cough so then it looks like she had a telemedicine visit and she was supposed to have an RSV and Covid and a flu swab and evidently the RSV was not completed.  She just had a lot of muscle aches and a cough I believe she was seen at [place] for a visit they did a CK enzyme and that was/low.    She then got physical therapy because her muscles were so tight from all this muscle aches from what ever was going on.    She has not thought to discuss this with her osteoporosis provider.  She spoke with a relative who has taken Reclast infusions and that person was told to premedicate with Tylenol and lots of water and the patient does not remember being told that.  I think at some point she did get instructions but has not thought to call the osteoporosis provider to see if anything else needs to be done or checked.  She is feeling better now.    I think she suspected that this was a physical today and this was just scheduled as a problem focused visit.  She is fasting so we can check her lipids as well as her kidney and liver function and vitamin D because she is been low with vitamin D in the past.    Objective:BP (!) 142/64 (BP Location: Right arm, Patient Position: Sitting, Cuff Size: Adult Regular)   Pulse 60   Wt 60 kg (132 lb 5 oz)   SpO2 100%   BMI 25.84 kg/m    She had to wait in line for quite a long time and we think that that is likely why the blood pressure was elevated.  She has been evaluated for the muscle aches so I did not repeat that.    Assessment: Hyperlipidemia  Osteoporosis  History of vitamin D deficiency    Plan:  She will be notified of the results of her tests and I will refill her medications as needed.  We went through some health maintenance requirements talked about her vaccines and mammogram and I will order a Cologuard because that is due this year as well.  We also made an appointment for her with Dr. Wilver To to discuss her reaction to the request and if we needed to do anything differently the next time she gets 1.    Total time spent was over 20 minutes today reviewing her health history and coming up with a care plan

## 2022-01-08 LAB — DEPRECATED CALCIDIOL+CALCIFEROL SERPL-MC: 63 UG/L (ref 30–80)

## 2022-01-10 DIAGNOSIS — E78.5 HYPERLIPIDEMIA, UNSPECIFIED HYPERLIPIDEMIA TYPE: ICD-10-CM

## 2022-01-10 RX ORDER — ROSUVASTATIN CALCIUM 20 MG/1
20 TABLET, COATED ORAL AT BEDTIME
Qty: 90 TABLET | Refills: 3 | Status: SHIPPED | OUTPATIENT
Start: 2022-01-10 | End: 2023-03-26

## 2022-01-25 ENCOUNTER — OFFICE VISIT (OUTPATIENT)
Dept: INTERNAL MEDICINE | Facility: CLINIC | Age: 67
End: 2022-01-25
Payer: COMMERCIAL

## 2022-01-25 VITALS
HEIGHT: 60 IN | SYSTOLIC BLOOD PRESSURE: 136 MMHG | WEIGHT: 135 LBS | BODY MASS INDEX: 26.5 KG/M2 | OXYGEN SATURATION: 99 % | HEART RATE: 66 BPM | DIASTOLIC BLOOD PRESSURE: 70 MMHG

## 2022-01-25 DIAGNOSIS — M81.0 AGE-RELATED OSTEOPOROSIS WITHOUT CURRENT PATHOLOGICAL FRACTURE: Primary | ICD-10-CM

## 2022-01-25 DIAGNOSIS — R52 BODY ACHES: ICD-10-CM

## 2022-01-25 PROCEDURE — 99213 OFFICE O/P EST LOW 20 MIN: CPT | Performed by: INTERNAL MEDICINE

## 2022-01-25 ASSESSMENT — MIFFLIN-ST. JEOR: SCORE: 1063.86
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
## 2022-01-25 NOTE — PATIENT INSTRUCTIONS
Reported symptoms - dry cough, runny nose, muscle and bone pain, body aches after Reclast.   With all these symptoms, I would probably not do the Reclast again.  When we see the DXA scan in Oct or Nov this year, we will make the decision what to do next.

## 2022-02-01 ENCOUNTER — HOSPITAL ENCOUNTER (OUTPATIENT)
Dept: MAMMOGRAPHY | Facility: CLINIC | Age: 67
Discharge: HOME | End: 2022-02-01
Attending: FAMILY MEDICINE | Admitting: FAMILY MEDICINE
Payer: COMMERCIAL

## 2022-02-01 DIAGNOSIS — Z12.31 VISIT FOR SCREENING MAMMOGRAM: ICD-10-CM

## 2022-02-01 PROCEDURE — 77067 SCR MAMMO BI INCL CAD: CPT

## 2022-04-06 ENCOUNTER — IMMUNIZATION (OUTPATIENT)
Dept: NURSING | Facility: CLINIC | Age: 67
End: 2022-04-06
Payer: COMMERCIAL

## 2022-04-06 PROCEDURE — 91306 COVID-19,PF,MODERNA (18+ YRS BOOSTER .25ML): CPT

## 2022-04-06 PROCEDURE — 0064A COVID-19,PF,MODERNA (18+ YRS BOOSTER .25ML): CPT

## 2022-04-11 LAB — COLOGUARD-ABSTRACT: NEGATIVE

## 2022-05-24 ENCOUNTER — VIRTUAL VISIT (OUTPATIENT)
Dept: FAMILY MEDICINE | Facility: CLINIC | Age: 67
End: 2022-05-24
Payer: COMMERCIAL

## 2022-05-24 DIAGNOSIS — I25.10 CORONARY ARTERY DISEASE INVOLVING NATIVE CORONARY ARTERY OF NATIVE HEART WITHOUT ANGINA PECTORIS: ICD-10-CM

## 2022-05-24 DIAGNOSIS — U07.1 INFECTION DUE TO 2019 NOVEL CORONAVIRUS: Primary | ICD-10-CM

## 2022-05-24 PROCEDURE — 99214 OFFICE O/P EST MOD 30 MIN: CPT | Mod: 95 | Performed by: FAMILY MEDICINE

## 2022-05-24 NOTE — PATIENT INSTRUCTIONS
I am treating your COVID19 with paxlovid which you take two antiviral medications twice a day for 5 days, 3 tabs twice a day.    While you are on the paxlovid please hold hour rosuvastatin medication due to a potential medication interaction.          Thank you for choosing [facility].  You may be receiving an email and/or telephone survey request from [facility] Customer Experience regarding your visit today.  Please take a few minutes to respond to the survey to let us know how we are doing.      If you have questions or concerns, please contact us via [email or web address] or you can contact your care team at [phone] option [phone].    Our Clinic hours are:  Monday - Thursday 7am-6pm  Friday 7am-5pm    The [place] outpatient lab hours are:  Monday - Friday 7am-4:30pm    Appointments are required, call [phone]    If you have clinical questions after hours or would like to schedule an appointment,  call the clinic at [phone].

## 2022-05-24 NOTE — PROGRESS NOTES
Cayla is a 67 year old who is being evaluated via a billable video visit.      How would you like to obtain your AVS? MyChart  If the video visit is dropped, the invitation should be resent by: Send to e-mail at: [email or web address]  Will anyone else be joining your video visit? No    Video Start Time: 0956    Assessment & Plan     Infection due to 2019 novel coronavirus  Patient started to have symptoms on 5/22/2022.  She is at risk with past h/o CAD which was listed on problem list and above age 65.  She would like treatment with paxlovid and discussed side effects and holding statin medication while on this medication.    - nirmatrelvir and ritonavir (PAXLOVID) therapy pack; Take 3 tablets by mouth 2 times daily for 5 days Take 2 Nirmatrelvir tablets and 1 Ritonavir tablet twice daily for 5 days.    Coronary artery disease involving native coronary artery of native heart without angina pectoris  She is on a statin medication, and instructed on holding this medication while on paxlovid.               BMI:   Estimated body mass index is 26.37 kg/m  as calculated from the following:    Height as of 1/25/22: 1.524 m (5').    Weight as of 1/25/22: 61.2 kg (135 lb).       See Patient Instructions    No follow-ups on file.    John Bishop MD  [facility]   Cayla is a 67 year old who presents for the following health issues  accompanied by her spouse.    HPI       COVID-19 Symptom Review  How many days ago did these symptoms start? 05/22/2022    Are any of the following symptoms significant for you?    New or worsening difficulty breathing? No    Worsening cough? No    Fever or chills? No    Headache: yes    Sore throat: no    Chest pain: no    Diarrhea: no    Body aches? no    What treatments has patient tried? Decongestant - oral   Does patient live in a nursing home, group home, or shelter? YES  Does patient have a way to get food/medications during quarantined? Yes, I have  a friend or family member who can help me.            Review of Systems         Objective           Vitals:  No vitals were obtained today due to virtual visit.    Physical Exam   GENERAL: Healthy, alert and no distress  EYES: Eyes grossly normal to inspection.  No discharge or erythema, or obvious scleral/conjunctival abnormalities.  RESP: No audible wheeze, cough, or visible cyanosis.  No visible retractions or increased work of breathing.    SKIN: Visible skin clear. No significant rash, abnormal pigmentation or lesions.  NEURO: Cranial nerves grossly intact.  Mentation and speech appropriate for age.  PSYCH: Mentation appears normal, affect normal/bright, judgement and insight intact, normal speech and appearance well-groomed.               MASSBP Score 5/24/2022   Age Greater than or equal to 65 years 2   BMI greater than or equal to 35 kg/m2 0   Has Diabetes Mellitus 0   Has Chronic Kidney Disease 0   Has Cardiovascular Disease and 55 years or older 2   Has Chronic Respiratory Disease and 55 years or older 0   Has Hypertension and 55 years or older 0   Is Immunocompromised 0   Is Pregnant 0   Member of [facility] community (Black/, /, ,  or , or  or [place] Native)  0   MASSBP Score 4   Has the patient had a positive COVID test outside our system?  Yes   What day did symptoms start?  5/22/2022       Estimated body mass index is 26.37 kg/m  as calculated from the following:    Height as of 1/25/22: 1.524 m (5').    Weight as of 1/25/22: 61.2 kg (135 lb).     GFR Estimate   Date Value Ref Range Status   01/07/2022 82 >60 mL/min/1.73m2 Final     Comment:     Effective December 21, 2021 eGFRcr in adults is calculated using the 2021 CKD-EPI creatinine equation which includes age and gender (Mary martinez al., NEJM, DOI: 10.1056/XSIMmk6816838)   06/25/2021 >60 >60 mL/min/1.73m2 Final        FDA Facts Sheet  Lexicomp Drug Interaction  review    Medications were reviewed with the patient and held or adjusted where applicable.    Current Outpatient Medications   Medication     aspirin 81 mg chewable tablet     CALCIUM CITRATE-VITAMIN D3 ORAL     CHOLECALCIFEROL, VITAMIN D3, (VITAMIN D3 ORAL)     ferrous sulfate 325 (65 FE) MG tablet     nirmatrelvir and ritonavir (PAXLOVID) therapy pack     rosuvastatin (CRESTOR) 20 MG tablet     No current facility-administered medications for this visit.                         -                         Video-Visit Details    Type of service:  Video Visit    Video End Time:1007    Originating Location (pt. Location): Home    Distant Location (provider location):  [facility]     Platform used for Video Visit: [employer]

## 2022-08-04 ENCOUNTER — TELEPHONE (OUTPATIENT)
Dept: FAMILY MEDICINE | Facility: CLINIC | Age: 67
End: 2022-08-04

## 2022-08-19 ENCOUNTER — OFFICE VISIT (OUTPATIENT)
Dept: CARDIOLOGY | Facility: CLINIC | Age: 67
End: 2022-08-19
Payer: COMMERCIAL

## 2022-08-19 VITALS
WEIGHT: 123 LBS | DIASTOLIC BLOOD PRESSURE: 52 MMHG | BODY MASS INDEX: 24.02 KG/M2 | RESPIRATION RATE: 16 BRPM | HEART RATE: 68 BPM | SYSTOLIC BLOOD PRESSURE: 116 MMHG

## 2022-08-19 DIAGNOSIS — I25.10 CORONARY ARTERY DISEASE INVOLVING NATIVE CORONARY ARTERY OF NATIVE HEART WITHOUT ANGINA PECTORIS: Primary | ICD-10-CM

## 2022-08-19 PROCEDURE — 99214 OFFICE O/P EST MOD 30 MIN: CPT | Performed by: INTERNAL MEDICINE

## 2022-08-19 NOTE — LETTER
8/19/2022    Breanna Garcia MD  [address] [address]    RE: Cayla Kimani       Dear Colleague,     I had the pleasure of seeing Cayla Harman in the [facility].      Cardiology Progress Note     Assessment:  Coronary artery disease, status post elective stenting of LAD in July 2020, no angina  Hypercholesterolemia on statin, satisfactory control      Plan:  Continue current medications  I encouraged her to become more physically active    We discussed potential benefits and shortcomings of periodic stress testing.  We decided not to do it at this point.    Follow-up in 1 year    Subjective:   This is 67 year old female who comes in today for follow-up visit.  She has done well.  She denies exertional chest pain or shortness of breath.  She has not had heart palpitations or syncope.  She denies bruising or bleeding bleeding.  She is compliant with all medications    Review of Systems:   Negative other than history of present illness    Objective:   /52 (BP Location: Left arm, Patient Position: Sitting, Cuff Size: Adult Regular)   Pulse 68   Resp 16   Wt 55.8 kg (123 lb)   BMI 24.02 kg/m    Physical Exam:  GENERAL: no distress  NECK: No JVD  LUNGS: Clear to auscultation.  CARDIAC: regular rhythm, S1 & S2 normal.  No heaves, thrills, gallops or murmurs.  ABDOMEN: flat, negative hepatosplenomegaly, soft and non-tender.  EXTREMITIES: No evidence of cyanosis, clubbing or edema.    Current Outpatient Medications   Medication Sig Dispense Refill     aspirin 81 mg chewable tablet [ASPIRIN 81 MG CHEWABLE TABLET] Chew 1 tablet (81 mg total) daily. Take aspirin indefinitely (for the rest of your life). 30 tablet 11     CALCIUM CITRATE-VITAMIN D3 ORAL [CALCIUM CITRATE-VITAMIN D3 ORAL] Take 0.5 tablets by mouth 2 (two) times a day.        CHOLECALCIFEROL, VITAMIN D3, (VITAMIN D3 ORAL) [CHOLECALCIFEROL, VITAMIN D3, (VITAMIN D3 ORAL)] Take 5,000 Units by mouth daily. TABS       ferrous  sulfate 325 (65 FE) MG tablet [FERROUS SULFATE 325 (65 FE) MG TABLET] Take 1 tablet by mouth daily. As directed       rosuvastatin (CRESTOR) 20 MG tablet Take 1 tablet (20 mg) by mouth At Bedtime 90 tablet 3       Cardiographics:    EC2020 read by me normal sinus rhythm within normal limits     ECG: Normal sinus rhythm within normal limits  CT coronary angiogram: 2020    The total Agatston calcium score is 17. A calcium score in this range places the individual in the 50th percentile when compared to an age and gender matched control group and implies a moderate risk of cardiac events in the next ten years.    There is mild to moderate disease in the proximal LAD estimated luminal stenosis of 30 to 50%.    There is a focal area of severe stenosis in the mid left anterior descending estimated at 70% or greater. Cannot fully exclude that this is artifactual but concerning for potential focal severe stenosis with soft plaque.    No significant obstructive disease in the circumflex and right coronary artery.     2021    Limited visulaization of the mid and distal left anterior descending artery stents due to metallic artifact, but stents are likely patent.    Otherwise minimal nonobstructive coronary artery disease.        Coronary angiogram: 2020  LM mild dz  LAD 90% serial stenoses in mid LAD, mild prox and distal LAD  LCx mild dz  RCA mild dz     EDP 13mmHg     Successful PCI of mid LAD with 3x16 and 2.75x12 Synergy JULIO (spot stenting rather than one long stent chosen in an attempt to avoid jailing ostium of D2)  0% residual, FLAKO 3 flow post     Lab Results    Chemistry/lipid CBC Cardiac Enzymes/BNP/TSH/INR   Recent Labs   Lab Test 22  1012   CHOL 140   HDL 44*   LDL 71   TRIG 126     Recent Labs   Lab Test 22  1012 21  0801 20  0907   LDL 71 85 45     Recent Labs   Lab Test 22  1012      POTASSIUM 4.7   CHLORIDE 107   CO2 25   GLC 93   BUN 14   CR 0.78    GFRESTIMATED 82   DAMIR 10.5     Recent Labs   Lab Test 01/07/22  1012 10/27/21  1348 10/12/21  1220   CR 0.78 0.84 0.81     No results for input(s): A1C in the last 55875 hours.       Recent Labs   Lab Test 07/20/20  0730   WBC 6.8   HGB 10.4*   HCT 35.4   MCV 65*        Recent Labs   Lab Test 07/20/20  0730 06/09/20  1308   HGB 10.4* 10.9*    Recent Labs   Lab Test 06/09/20  1308   TROPONINI <0.01     No results for input(s): BNP, NTBNPI, NTBNP in the last 08065 hours.  No results for input(s): TSH in the last 16998 hours.  No results for input(s): INR in the last 85887 hours.             Thank you for allowing me to participate in the care of your patient.    Sincerely,   Say Morris MD   [facility]  cc: No referring provider defined for this encounter.

## 2022-08-19 NOTE — PROGRESS NOTES
Cardiology Progress Note     Assessment:  Coronary artery disease, status post elective stenting of LAD in July 2020, no angina  Hypercholesterolemia on statin, satisfactory control      Plan:  Continue current medications  I encouraged her to become more physically active    We discussed potential benefits and shortcomings of periodic stress testing.  We decided not to do it at this point.    Follow-up in 1 year    Subjective:   This is 67 year old female who comes in today for follow-up visit.  She has done well.  She denies exertional chest pain or shortness of breath.  She has not had heart palpitations or syncope.  She denies bruising or bleeding bleeding.  She is compliant with all medications    Review of Systems:   Negative other than history of present illness    Objective:   /52 (BP Location: Left arm, Patient Position: Sitting, Cuff Size: Adult Regular)   Pulse 68   Resp 16   Wt 55.8 kg (123 lb)   BMI 24.02 kg/m    Physical Exam:  GENERAL: no distress  NECK: No JVD  LUNGS: Clear to auscultation.  CARDIAC: regular rhythm, S1 & S2 normal.  No heaves, thrills, gallops or murmurs.  ABDOMEN: flat, negative hepatosplenomegaly, soft and non-tender.  EXTREMITIES: No evidence of cyanosis, clubbing or edema.    Current Outpatient Medications   Medication Sig Dispense Refill     aspirin 81 mg chewable tablet [ASPIRIN 81 MG CHEWABLE TABLET] Chew 1 tablet (81 mg total) daily. Take aspirin indefinitely (for the rest of your life). 30 tablet 11     CALCIUM CITRATE-VITAMIN D3 ORAL [CALCIUM CITRATE-VITAMIN D3 ORAL] Take 0.5 tablets by mouth 2 (two) times a day.        CHOLECALCIFEROL, VITAMIN D3, (VITAMIN D3 ORAL) [CHOLECALCIFEROL, VITAMIN D3, (VITAMIN D3 ORAL)] Take 5,000 Units by mouth daily. TABS       ferrous sulfate 325 (65 FE) MG tablet [FERROUS SULFATE 325 (65 FE) MG TABLET] Take 1 tablet by mouth daily. As directed       rosuvastatin (CRESTOR) 20 MG tablet Take 1 tablet (20 mg) by mouth At Bedtime 90  tablet 3       Cardiographics:    EC2020 read by me normal sinus rhythm within normal limits     ECG: Normal sinus rhythm within normal limits  CT coronary angiogram: 2020    The total Agatston calcium score is 17. A calcium score in this range places the individual in the 50th percentile when compared to an age and gender matched control group and implies a moderate risk of cardiac events in the next ten years.    There is mild to moderate disease in the proximal LAD estimated luminal stenosis of 30 to 50%.    There is a focal area of severe stenosis in the mid left anterior descending estimated at 70% or greater. Cannot fully exclude that this is artifactual but concerning for potential focal severe stenosis with soft plaque.    No significant obstructive disease in the circumflex and right coronary artery.     2021    Limited visulaization of the mid and distal left anterior descending artery stents due to metallic artifact, but stents are likely patent.    Otherwise minimal nonobstructive coronary artery disease.        Coronary angiogram: 2020  LM mild dz  LAD 90% serial stenoses in mid LAD, mild prox and distal LAD  LCx mild dz  RCA mild dz     EDP 13mmHg     Successful PCI of mid LAD with 3x16 and 2.75x12 Synergy JULIO (spot stenting rather than one long stent chosen in an attempt to avoid jailing ostium of D2)  0% residual, FLAKO 3 flow post     Lab Results    Chemistry/lipid CBC Cardiac Enzymes/BNP/TSH/INR   Recent Labs   Lab Test 22  1012   CHOL 140   HDL 44*   LDL 71   TRIG 126     Recent Labs   Lab Test 22  1012 21  0801 20  0907   LDL 71 85 45     Recent Labs   Lab Test 22  1012      POTASSIUM 4.7   CHLORIDE 107   CO2 25   GLC 93   BUN 14   CR 0.78   GFRESTIMATED 82   DAMIR 10.5     Recent Labs   Lab Test 22  1012 10/27/21  1348 10/12/21  1220   CR 0.78 0.84 0.81     No results for input(s): A1C in the last 64931 hours.       Recent Labs    Lab Test 07/20/20  0730   WBC 6.8   HGB 10.4*   HCT 35.4   MCV 65*        Recent Labs   Lab Test 07/20/20  0730 06/09/20  1308   HGB 10.4* 10.9*    Recent Labs   Lab Test 06/09/20  1308   TROPONINI <0.01     No results for input(s): BNP, NTBNPI, NTBNP in the last 06114 hours.  No results for input(s): TSH in the last 68311 hours.  No results for input(s): INR in the last 92680 hours.

## 2022-08-21 ENCOUNTER — HEALTH MAINTENANCE LETTER (OUTPATIENT)
Age: 67
End: 2022-08-21

## 2022-09-09 ENCOUNTER — OFFICE VISIT (OUTPATIENT)
Dept: INTERNAL MEDICINE | Facility: CLINIC | Age: 67
End: 2022-09-09
Payer: COMMERCIAL

## 2022-09-09 VITALS
WEIGHT: 138 LBS | OXYGEN SATURATION: 98 % | SYSTOLIC BLOOD PRESSURE: 110 MMHG | DIASTOLIC BLOOD PRESSURE: 68 MMHG | HEART RATE: 64 BPM | HEIGHT: 59 IN | BODY MASS INDEX: 27.82 KG/M2

## 2022-09-09 DIAGNOSIS — Z86.39 PERSONAL HISTORY OF OTHER ENDOCRINE, NUTRITIONAL AND METABOLIC DISEASE: ICD-10-CM

## 2022-09-09 DIAGNOSIS — M81.0 AGE-RELATED OSTEOPOROSIS WITHOUT CURRENT PATHOLOGICAL FRACTURE: ICD-10-CM

## 2022-09-09 DIAGNOSIS — I25.10 CORONARY ARTERY DISEASE INVOLVING NATIVE CORONARY ARTERY OF NATIVE HEART WITHOUT ANGINA PECTORIS: ICD-10-CM

## 2022-09-09 DIAGNOSIS — Z13.1 SCREENING FOR DIABETES MELLITUS: ICD-10-CM

## 2022-09-09 DIAGNOSIS — Z23 INFLUENZA VACCINATION ADMINISTERED AT CURRENT VISIT: ICD-10-CM

## 2022-09-09 DIAGNOSIS — E78.2 MIXED HYPERLIPIDEMIA: ICD-10-CM

## 2022-09-09 DIAGNOSIS — Z00.00 ROUTINE GENERAL MEDICAL EXAMINATION AT A HEALTH CARE FACILITY: Primary | ICD-10-CM

## 2022-09-09 DIAGNOSIS — D56.8 OTHER THALASSEMIA (H): ICD-10-CM

## 2022-09-09 PROCEDURE — 90662 IIV NO PRSV INCREASED AG IM: CPT | Performed by: INTERNAL MEDICINE

## 2022-09-09 PROCEDURE — G0008 ADMIN INFLUENZA VIRUS VAC: HCPCS | Performed by: INTERNAL MEDICINE

## 2022-09-09 PROCEDURE — G0439 PPPS, SUBSEQ VISIT: HCPCS | Performed by: INTERNAL MEDICINE

## 2022-09-09 PROCEDURE — 99213 OFFICE O/P EST LOW 20 MIN: CPT | Mod: 25 | Performed by: INTERNAL MEDICINE

## 2022-09-09 ASSESSMENT — ENCOUNTER SYMPTOMS
FREQUENCY: 1
HEMATURIA: 0
HEADACHES: 0
SHORTNESS OF BREATH: 1
CHILLS: 0
NAUSEA: 0
JOINT SWELLING: 0
BREAST MASS: 0
NERVOUS/ANXIOUS: 0
PARESTHESIAS: 0
HEMATOCHEZIA: 0
DIARRHEA: 0
FEVER: 0
ABDOMINAL PAIN: 0
EYE PAIN: 0
DYSURIA: 0
WEAKNESS: 0
ARTHRALGIAS: 1
HEARTBURN: 0
PALPITATIONS: 0
CONSTIPATION: 0
MYALGIAS: 1
SORE THROAT: 0
DIZZINESS: 0
COUGH: 0

## 2022-09-09 ASSESSMENT — ACTIVITIES OF DAILY LIVING (ADL): CURRENT_FUNCTION: NO ASSISTANCE NEEDED

## 2022-09-09 NOTE — PROGRESS NOTES
"SUBJECTIVE:   Cayla Harman is a 67 year old female who presents for Preventive Visit.      Patient has been advised of split billing requirements and indicates understanding: Yes  Are you in the first 12 months of your Medicare coverage?  No    Healthy Habits:     In general, how would you rate your overall health?  Good    Frequency of exercise:  2-3 days/week    Duration of exercise:  Other    Do you usually eat at least 4 servings of fruit and vegetables a day, include whole grains    & fiber and avoid regularly eating high fat or \"junk\" foods?  Yes    Taking medications regularly:  Yes    Medication side effects:  Other    Ability to successfully perform activities of daily living:  No assistance needed    Home Safety:  No safety concerns identified    Hearing Impairment:  No hearing concerns    In the past 6 months, have you been bothered by leaking of urine? Yes    In general, how would you rate your overall mental or emotional health?  Good      PHQ-2 Total Score: 0    Additional concerns today:  Yes    Do you feel safe in your environment? Yes    Have you ever done Advance Care Planning? (For example, a Health Directive, POLST, or a discussion with a medical provider or your loved ones about your wishes): Yes, advance care planning is on file.       Fall risk  Fallen 2 or more times in the past year?: No  Any fall with injury in the past year?: No  0  Cognitive Screening   1) Repeat 3 items (Leader, Season, Table)    2) Clock draw: NORMAL  3) 3 item recall: Recalls 2 objects   Results: NORMAL clock, 1-2 items recalled: COGNITIVE IMPAIRMENT LESS LIKELY    Mini-CogTM Copyright SHERMAN Gonzales. Licensed by the author for use in [facility]; reprinted with permission ([email or web address]). All rights reserved.      Do you have sleep apnea, excessive snoring or daytime drowsiness?: no    Reviewed and updated as needed this visit by clinical staff   Tobacco  Allergies  Meds                Reviewed and updated as " needed this visit by Provider                   Social History     Tobacco Use     Smoking status: Never Smoker     Smokeless tobacco: Never Used   Substance Use Topics     Alcohol use: Yes     Comment: Alcoholic Drinks/day: occasional     If you drink alcohol do you typically have >3 drinks per day or >7 drinks per week? No    Alcohol Use 9/9/2022   Prescreen: >3 drinks/day or >7 drinks/week? No         Current providers sharing in care for this patient include:   Patient Care Team:  Breanna Garcia MD as PCP - General (Family Practice)  Breanna Garcia MD as Assigned PCP  Say Morris MD as Assigned Heart and Vascular Provider    The following health maintenance items are reviewed in Epic and correct as of today:  Health Maintenance Due   Topic Date Due     ANNUAL REVIEW OF  ORDERS  Never done     INFLUENZA VACCINE (1) 09/01/2022       Breast CA Risk Assessment (FHS-7) 9/9/2022   Do you have a family history of breast, colon, or ovarian cancer? No / Unknown       Pertinent mammograms are reviewed under the imaging tab.    Review of Systems   Constitutional: Negative for chills and fever.   HENT: Negative for congestion, ear pain, hearing loss and sore throat.    Eyes: Negative for pain and visual disturbance.   Respiratory: Positive for shortness of breath. Negative for cough.    Cardiovascular: Negative for chest pain, palpitations and peripheral edema.   Gastrointestinal: Negative for abdominal pain, constipation, diarrhea, heartburn, hematochezia and nausea.   Breasts:  Positive for tenderness. Negative for breast mass and discharge.   Genitourinary: Positive for frequency and urgency. Negative for dysuria, genital sores, hematuria, pelvic pain, vaginal bleeding and vaginal discharge.   Musculoskeletal: Positive for arthralgias and myalgias. Negative for joint swelling.   Skin: Negative for rash.   Neurological: Negative for dizziness, weakness, headaches and paresthesias.   Psychiatric/Behavioral:  "Negative for mood changes. The patient is not nervous/anxious.        OBJECTIVE:   /68 (BP Location: Right arm, Patient Position: Sitting, Cuff Size: Adult Large)   Pulse 64   Ht 1.499 m (4' 11\")   Wt 62.6 kg (138 lb)   SpO2 98%   BMI 27.87 kg/m   Estimated body mass index is 27.87 kg/m  as calculated from the following:    Height as of this encounter: 1.499 m (4' 11\").    Weight as of this encounter: 62.6 kg (138 lb).  Physical Exam    General: Alert, in no distress  Skin: No significant lesion seen.  Eyes/nose/throat: Eyes without scleral icterus, eye movements normal, pupils equal and reactive, oropharynx clear, ears with normal TM's  MSK: Neck with good ROM  Lymphatic: Neck without adenopathy or masses  Endocrine: Thyroid with no nodules to palpation  Pulm: Lungs clear to auscultation bilaterally  Cardiac: Heart with regular rate and rhythm, no murmur or gallop  GI: Abdomen soft, nontender. No palpable enlargement of liver or spleen  MSK: Extremities no tenderness or edema  Neuro: Moves all extremities, without focal weakness  Psych: Alert, normal mental status. Normal affect and speech      ASSESSMENT / PLAN:     Annual wellness visit, Ms. Harman is generally feeling well, and was previously seeing Dr. Garcia who is left our organization.    She is a retired [employer] .    We will set her up for fasting blood test on a future morning, at which time we will check lipid panel, A1c test for diabetes, TSH thyroid test, comprehensive metabolic panel, blood cell counts, ferritin and also iron levels, because of her history of thalassemia    We will administer influenza vaccine today.    I reminded her to get the updated bivalent COVID-19 vaccine sometime this autumn.  She is planning a vacation to [place] and [place] in February 2023.    Issues are as follows:    Coronary artery disease, status post elective stenting of LAD in July 2020, no symptoms of angina, heart failure, or arrhythmia, on " aggressive risk factor modification with high intensity rosuvastatin and baby aspirin  Took clopidogrel for 1 year after her angioplasty    S/p PCI of mid LAD with 3x16 and 2.75x12 Synergy JULIO (spot stenting rather than one long stent chosen in an attempt to avoid jailing ostium of D2)  0% residual, FLAKO 3 flow post  January 2021    Limited visulaization of the mid and distal left anterior descending artery stents due to metallic artifact, but stents are likely patent.    Otherwise minimal nonobstructive coronary artery disease.    She maintains a vegetarian diet, and tries to stay physically active    Hyperlipidemia in the context of coronary disease, on high intensity rosuvastatin, target LDL less than 70  rosuvastatin (CRESTOR) 20 MG tablet  1-7-2022  LDL Cholesterol Calculated <=129 mg/dL 71      Direct Measure HDL >=50 mg/dL 44 Low      Triglycerides <=149 mg/dL 126      Cholesterol <=199 mg/dL 140      Microcytic anemia, she has some form of thalassemia.  She recalls that she has had a hemoglobin electrophoresis done many years ago.  Her CBC from July 2020 had a hemoglobin of 10.4 g with a microcytic MCV of 65.  She does take an iron tablet daily.    Will check ferritin and iron levels    Vasomotor versus allergic rhinitis  I told her she could try the over-the-counter nasal steroid fluticasone (brand-name Flonase) 1 or 2 sprays per nostril per day, which can be used throughout allergy season, or even long-term for vasomotor rhinitis.  She has been doing saline nasal irrigation, which is fine, she could certainly continue that, and then use the nasal steroid spray after the irrigation.    Osteoporosis, as of September 2022, she believes she might try Reclast 1 more time but if she has side effects from that infusion, she will probably switch back to Prolia    Took Prolia for approximately 3 years  She has had 1 infusion of Reclast, and may have had a week of side effects either from Reclast or a COVID booster  that she got at about the same time.  She is going to be meeting with Dr. Simpson later this autumn 2022 to decide whether to do another Reclast infusion versus switching to a different osteoporosis drug, possibly return to Prolia    Reported symptoms - dry cough, runny nose, muscle and bone pain, body aches after Reclast.     6- DXA  1. The spine bone density L2-L4 with T-score -2.0, with no statistically significant change compared to the previous DXA scan done in 2019.  2. Femoral bone densities show left femoral neck T- score -1.5 and right femoral neck T-score -2.0, with no statistically significant change compared to the previous DXA scan.  3. Trabecular bone score indicates moderate trabecular bone architecture.  66 y.o. female with LOW BONE DENSITY (OSTEOPENIA) and LOW fracture risk, adjusted for the TBS, with major osteoporotic fracture risk 7.7% and hip fracture risk 1.3%.     Postmenopause  BILATERAL FULL FIELD DIGITAL SCREENING MAMMOGRAM WITH TOMOSYNTHESIS  Performed on: 2/1/22    Constipation, she uses Benefiber which is fine.  If she needs something a bit stronger, she could use the osmotic laxative MiraLAX powder, which is safe to use even on a daily basis.    Primary osteoarthritis right knee  She has been working with [facility], has seen a physical therapist who gave her set of exercises that she has been doing on and off.  I suggested isometric exercise to strengthen the quadriceps muscle, without stressing the joint    I told her she could try topical diclofenac, which is known as Voltaren gel and is available over-the-counter.  Applied 3 or 4 times a day.    Mild stress incontinence and urgency.    Colon screening, next Cologuard test would be 2025  Cologuard 4-  COLOGUARD-ABSTRACT Negative      Tested positive for COVID-19 May 2022, after having had 4 injections of Moderna  I reminded her to get the updated bivalent COVID-19 vaccine against omicron variant, which will be  "available at [facility] starting on September 15, but is available at [facility] now    Has had injections of both pneumococcal vaccines Prevnar 13 and polysaccharide 23  Has had 2 shots of recombinant shingles vaccine  Current tetanus 2018    We will give seasonal influenza vaccine today September 9, 2022    Immunization History   Administered Date(s) Administered     COVID-19,PF,Moderna 02/16/2021, 03/16/2021, 10/23/2021     COVID-19,PF,Moderna Booster 04/06/2022     DT (PEDS <7y) 01/01/2000     FLUAD(HD)65+ QUAD 08/31/2020, 08/28/2021     Flu 65+ Years 09/04/2019     Flu, Unspecified 12/01/2008, 09/01/2012, 10/01/2015, 09/01/2016, 10/01/2017, 09/25/2018, 09/20/2020     HepA, Unspecified 01/01/2000     HepB, Unspecified 01/01/2000     Influenza (H1N1) 11/27/2009     Influenza (IIV3) PF 12/01/2008, 09/01/2012     MMR 01/16/2013     Pneumo Conj 13-V (2010&after) 03/22/2019     Pneumococcal 23 valent 11/06/2020     Poliovirus, inactivated (IPV) 01/16/2013     Td,adult,historic,unspecified 11/05/2008     Tdap (Adacel,Boostrix) 11/05/2008, 09/20/2018     Typhoid IM 10/05/2018     Typhoid Oral 01/16/2013, 09/20/2018     Yellow Fever 09/20/2018     Zoster vaccine recombinant adjuvanted (SHINGRIX) 04/05/2018, 07/27/2018     Zoster vaccine, live 11/30/2015             COUNSELING:  Reviewed preventive health counseling, as reflected in patient instructions       Healthy diet/nutrition    Estimated body mass index is 27.87 kg/m  as calculated from the following:    Height as of this encounter: 1.499 m (4' 11\").    Weight as of this encounter: 62.6 kg (138 lb).      She reports that she has never smoked. She has never used smokeless tobacco.      Appropriate preventive services were discussed with this patient, including applicable screening as appropriate for cardiovascular disease, diabetes, osteopenia/osteoporosis, and glaucoma.  As appropriate for age/gender, discussed screening for colorectal cancer, prostate " cancer, breast cancer, and cervical cancer. Checklist reviewing preventive services available has been given to the patient.    Reviewed patients plan of care and provided an AVS. The Basic Care Plan (routine screening as documented in Health Maintenance) for Cayla meets the Care Plan requirement. This Care Plan has been established and reviewed with the Patient.      HÉCTOR TAN MD  [facility]

## 2022-09-09 NOTE — PATIENT INSTRUCTIONS
Annual wellness visit, Ms. Harman is generally feeling well, and was previously seeing Dr. Garcia who is left our organization.    She is a retired [employer] .    We will set her up for fasting blood test on a future morning, at which time we will check lipid panel, A1c test for diabetes, TSH thyroid test, comprehensive metabolic panel, blood cell counts, ferritin and also iron levels, because of her history of thalassemia    We will administer influenza vaccine today.    I reminded her to get the updated bivalent COVID-19 vaccine sometime this autumn.  She is planning a vacation to [place] and [place] in February 2023.    Issues are as follows:    Coronary artery disease, status post elective stenting of LAD in July 2020, no symptoms of angina, heart failure, or arrhythmia, on aggressive risk factor modification with high intensity rosuvastatin and baby aspirin  Took clopidogrel for 1 year after her angioplasty    S/p PCI of mid LAD with 3x16 and 2.75x12 Synergy JULIO (spot stenting rather than one long stent chosen in an attempt to avoid jailing ostium of D2)  0% residual, FLAKO 3 flow post  January 2021    Limited visulaization of the mid and distal left anterior descending artery stents due to metallic artifact, but stents are likely patent.    Otherwise minimal nonobstructive coronary artery disease.    She maintains a vegetarian diet, and tries to stay physically active    Hyperlipidemia in the context of coronary disease, on high intensity rosuvastatin, target LDL less than 70  rosuvastatin (CRESTOR) 20 MG tablet  1-7-2022  LDL Cholesterol Calculated <=129 mg/dL 71      Direct Measure HDL >=50 mg/dL 44 Low      Triglycerides <=149 mg/dL 126      Cholesterol <=199 mg/dL 140      Microcytic anemia, she has some form of thalassemia.  She recalls that she has had a hemoglobin electrophoresis done many years ago.  Her CBC from July 2020 had a hemoglobin of 10.4 g with a microcytic MCV of 65.  She does take an  iron tablet daily.    Will check ferritin and iron levels    Vasomotor versus allergic rhinitis  I told her she could try the over-the-counter nasal steroid fluticasone (brand-name Flonase) 1 or 2 sprays per nostril per day, which can be used throughout allergy season, or even long-term for vasomotor rhinitis.  She has been doing saline nasal irrigation, which is fine, she could certainly continue that, and then use the nasal steroid spray after the irrigation.    Osteoporosis, as of September 2022, she believes she might try Reclast 1 more time but if she has side effects from that infusion, she will probably switch back to Prolia    Took Prolia for approximately 3 years  She has had 1 infusion of Reclast, and may have had a week of side effects either from Reclast or a COVID booster that she got at about the same time.  She is going to be meeting with Dr. Simpson later this autumn 2022 to decide whether to do another Reclast infusion versus switching to a different osteoporosis drug, possibly return to Prolia    Reported symptoms - dry cough, runny nose, muscle and bone pain, body aches after Reclast.     6- DXA  1. The spine bone density L2-L4 with T-score -2.0, with no statistically significant change compared to the previous DXA scan done in 2019.  2. Femoral bone densities show left femoral neck T- score -1.5 and right femoral neck T-score -2.0, with no statistically significant change compared to the previous DXA scan.  3. Trabecular bone score indicates moderate trabecular bone architecture.  66 y.o. female with LOW BONE DENSITY (OSTEOPENIA) and LOW fracture risk, adjusted for the TBS, with major osteoporotic fracture risk 7.7% and hip fracture risk 1.3%.     Postmenopause  BILATERAL FULL FIELD DIGITAL SCREENING MAMMOGRAM WITH TOMOSYNTHESIS  Performed on: 2/1/22    Constipation, she uses Benefiber which is fine.  If she needs something a bit stronger, she could use the osmotic laxative MiraLAX  powder, which is safe to use even on a daily basis.    Primary osteoarthritis right knee  She has been working with [facility], has seen a physical therapist who gave her set of exercises that she has been doing on and off.  I suggested isometric exercise to strengthen the quadriceps muscle, without stressing the joint    I told her she could try topical diclofenac, which is known as Voltaren gel and is available over-the-counter.  Applied 3 or 4 times a day.    Mild stress incontinence and urgency.    Colon screening, next Cologuard test would be 2025  Cologuard 4-  COLOGUARD-ABSTRACT Negative      Tested positive for COVID-19 May 2022, after having had 4 injections of Moderna  I reminded her to get the updated bivalent COVID-19 vaccine against omicron variant, which will be available at [facility] starting on September 15, but is available at [facility] now    Has had injections of both pneumococcal vaccines Prevnar 13 and polysaccharide 23  Has had 2 shots of recombinant shingles vaccine  Current tetanus 2018    We will give seasonal influenza vaccine today September 9, 2022    Immunization History   Administered Date(s) Administered    COVID-19,PF,Moderna 02/16/2021, 03/16/2021, 10/23/2021    COVID-19,PF,Moderna Booster 04/06/2022    DT (PEDS <7y) 01/01/2000    FLUAD(HD)65+ QUAD 08/31/2020, 08/28/2021    Flu 65+ Years 09/04/2019    Flu, Unspecified 12/01/2008, 09/01/2012, 10/01/2015, 09/01/2016, 10/01/2017, 09/25/2018, 09/20/2020    HepA, Unspecified 01/01/2000    HepB, Unspecified 01/01/2000    Influenza (H1N1) 11/27/2009    Influenza (IIV3) PF 12/01/2008, 09/01/2012    MMR 01/16/2013    Pneumo Conj 13-V (2010&after) 03/22/2019    Pneumococcal 23 valent 11/06/2020    Poliovirus, inactivated (IPV) 01/16/2013    Td,adult,historic,unspecified 11/05/2008    Tdap (Adacel,Boostrix) 11/05/2008, 09/20/2018    Typhoid IM 10/05/2018    Typhoid Oral 01/16/2013, 09/20/2018    Yellow Fever 09/20/2018     Zoster vaccine recombinant adjuvanted (SHINGRIX) 04/05/2018, 07/27/2018    Zoster vaccine, live 11/30/2015

## 2022-09-14 ENCOUNTER — LAB (OUTPATIENT)
Dept: LAB | Facility: CLINIC | Age: 67
End: 2022-09-14
Payer: COMMERCIAL

## 2022-09-14 DIAGNOSIS — E78.2 MIXED HYPERLIPIDEMIA: ICD-10-CM

## 2022-09-14 DIAGNOSIS — Z13.1 SCREENING FOR DIABETES MELLITUS: ICD-10-CM

## 2022-09-14 DIAGNOSIS — Z00.00 ROUTINE GENERAL MEDICAL EXAMINATION AT A HEALTH CARE FACILITY: ICD-10-CM

## 2022-09-14 DIAGNOSIS — Z86.39 PERSONAL HISTORY OF OTHER ENDOCRINE, NUTRITIONAL AND METABOLIC DISEASE: ICD-10-CM

## 2022-09-14 DIAGNOSIS — D56.8 OTHER THALASSEMIA (H): ICD-10-CM

## 2022-09-14 LAB
ALBUMIN SERPL BCG-MCNC: 4 G/DL (ref 3.5–5.2)
ALP SERPL-CCNC: 108 U/L (ref 35–104)
ALT SERPL W P-5'-P-CCNC: 28 U/L (ref 10–35)
ANION GAP SERPL CALCULATED.3IONS-SCNC: 6 MMOL/L (ref 7–15)
AST SERPL W P-5'-P-CCNC: 26 U/L (ref 10–35)
BILIRUB SERPL-MCNC: 0.7 MG/DL
BUN SERPL-MCNC: 15.1 MG/DL (ref 8–23)
CALCIUM SERPL-MCNC: 10.3 MG/DL (ref 8.8–10.2)
CHLORIDE SERPL-SCNC: 107 MMOL/L (ref 98–107)
CHOLEST SERPL-MCNC: 138 MG/DL
CREAT SERPL-MCNC: 0.84 MG/DL (ref 0.51–0.95)
DEPRECATED HCO3 PLAS-SCNC: 29 MMOL/L (ref 22–29)
ERYTHROCYTE [DISTWIDTH] IN BLOOD BY AUTOMATED COUNT: 15.6 % (ref 10–15)
FERRITIN SERPL-MCNC: 137 NG/ML (ref 11–328)
GFR SERPL CREATININE-BSD FRML MDRD: 75 ML/MIN/1.73M2
GLUCOSE SERPL-MCNC: 91 MG/DL (ref 70–99)
HBA1C MFR BLD: 5.8 % (ref 0–5.6)
HCT VFR BLD AUTO: 35.7 % (ref 35–47)
HDLC SERPL-MCNC: 46 MG/DL
HGB BLD-MCNC: 10.9 G/DL (ref 11.7–15.7)
IRON BINDING CAPACITY (ROCHE): 269 UG/DL (ref 240–430)
IRON SATN MFR SERPL: 27 % (ref 15–46)
IRON SERPL-MCNC: 73 UG/DL (ref 37–145)
LDLC SERPL CALC-MCNC: 64 MG/DL
MCH RBC QN AUTO: 19.2 PG (ref 26.5–33)
MCHC RBC AUTO-ENTMCNC: 30.5 G/DL (ref 31.5–36.5)
MCV RBC AUTO: 63 FL (ref 78–100)
NONHDLC SERPL-MCNC: 92 MG/DL
PLATELET # BLD AUTO: 218 10E3/UL (ref 150–450)
POTASSIUM SERPL-SCNC: 4.6 MMOL/L (ref 3.4–5.3)
PROT SERPL-MCNC: 7.2 G/DL (ref 6.4–8.3)
RBC # BLD AUTO: 5.68 10E6/UL (ref 3.8–5.2)
SODIUM SERPL-SCNC: 142 MMOL/L (ref 136–145)
TRIGL SERPL-MCNC: 140 MG/DL
TSH SERPL DL<=0.005 MIU/L-ACNC: 3.78 UIU/ML (ref 0.3–4.2)
WBC # BLD AUTO: 6.1 10E3/UL (ref 4–11)

## 2022-09-14 PROCEDURE — 80053 COMPREHEN METABOLIC PANEL: CPT

## 2022-09-14 PROCEDURE — 83540 ASSAY OF IRON: CPT

## 2022-09-14 PROCEDURE — 83550 IRON BINDING TEST: CPT

## 2022-09-14 PROCEDURE — 80061 LIPID PANEL: CPT

## 2022-09-14 PROCEDURE — 84443 ASSAY THYROID STIM HORMONE: CPT

## 2022-09-14 PROCEDURE — 36415 COLL VENOUS BLD VENIPUNCTURE: CPT

## 2022-09-14 PROCEDURE — 82728 ASSAY OF FERRITIN: CPT

## 2022-09-14 PROCEDURE — 83036 HEMOGLOBIN GLYCOSYLATED A1C: CPT

## 2022-09-14 PROCEDURE — 85027 COMPLETE CBC AUTOMATED: CPT

## 2022-10-10 ENCOUNTER — ANCILLARY PROCEDURE (OUTPATIENT)
Dept: BONE DENSITY | Facility: CLINIC | Age: 67
End: 2022-10-10
Attending: INTERNAL MEDICINE
Payer: COMMERCIAL

## 2022-10-10 DIAGNOSIS — M81.0 AGE-RELATED OSTEOPOROSIS WITHOUT CURRENT PATHOLOGICAL FRACTURE: ICD-10-CM

## 2022-10-10 PROCEDURE — 77080 DXA BONE DENSITY AXIAL: CPT | Mod: TC | Performed by: RADIOLOGY

## 2022-11-02 ENCOUNTER — OFFICE VISIT (OUTPATIENT)
Dept: INTERNAL MEDICINE | Facility: CLINIC | Age: 67
End: 2022-11-02
Payer: COMMERCIAL

## 2022-11-02 VITALS
WEIGHT: 140.9 LBS | OXYGEN SATURATION: 96 % | DIASTOLIC BLOOD PRESSURE: 80 MMHG | HEART RATE: 70 BPM | BODY MASS INDEX: 28.46 KG/M2 | SYSTOLIC BLOOD PRESSURE: 139 MMHG

## 2022-11-02 DIAGNOSIS — Z92.29 PERSONAL HISTORY OF OTHER DRUG THERAPY: ICD-10-CM

## 2022-11-02 DIAGNOSIS — M81.0 AGE-RELATED OSTEOPOROSIS WITHOUT CURRENT PATHOLOGICAL FRACTURE: Primary | ICD-10-CM

## 2022-11-02 DIAGNOSIS — I25.10 CORONARY ARTERY DISEASE INVOLVING NATIVE CORONARY ARTERY OF NATIVE HEART WITHOUT ANGINA PECTORIS: ICD-10-CM

## 2022-11-02 PROCEDURE — 99214 OFFICE O/P EST MOD 30 MIN: CPT | Performed by: INTERNAL MEDICINE

## 2022-11-02 NOTE — PATIENT INSTRUCTIONS
Prolia 13th next week.  Prolia 14th in 6 months with my nurse. I will see you in 1 year.    DXA in 10/2024 .   Phone number to schedule [phone].    Daily calcium need is 9385-8525 mg a day from the diet and supplements.  Calcium citrate is easier to digest.  Vitamin D 2000 IU daily recommended.    Risk of rebound vertebral fractures is higher when Prolia suddenly stopped or dose was missed.      Prolia and Covid vaccine should be  for at least a week.
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
## 2022-11-17 ENCOUNTER — ALLIED HEALTH/NURSE VISIT (OUTPATIENT)
Dept: FAMILY MEDICINE | Facility: CLINIC | Age: 67
End: 2022-11-17
Payer: COMMERCIAL

## 2022-11-17 DIAGNOSIS — M81.0 AGE-RELATED OSTEOPOROSIS WITHOUT CURRENT PATHOLOGICAL FRACTURE: Primary | ICD-10-CM

## 2022-11-17 PROCEDURE — 96372 THER/PROPH/DIAG INJ SC/IM: CPT | Performed by: INTERNAL MEDICINE

## 2022-11-17 PROCEDURE — 99207 PR NO CHARGE NURSE ONLY: CPT

## 2022-11-17 NOTE — PROGRESS NOTES
"Prolia Injection Phone Screen      Screening questions have been asked 2-3 days prior to administration visit for Prolia. If any questions are answered with \"Yes,\" this phone encounter were will routed to ordering provider for further evaluation.     1.  When was the last injection?  2019    2.  Has insurance for this injection been verified?  Yes    3.  Did you experience any new onset achiness or rashes that lasted for over a month with your previous Prolia injection?   No    4.  Do you have a fever over 101?F or a new deep cough that is unusual for you today? No    5.  Have you started any new medications in the last 6 months that you were told could affect your immune system? These may have been prescribed by oncologist, transplant, rheumatology, or dermatology.   No    6.  In the last 6 months have you have gastric bypass or parathyroid surgery?   No    7.  Do you plan dental work requiring drilling into the bone such as implants/extractions or oral surgery in the next 2-3 months?   No    8. Do you have new insurance since the last injection?    9. Have you received the Covid-19 vaccine? Yes  If No - Proceed with Prolia injection  If Yes - Date of vaccination 11/3/2022. Will need to wait until 2 weeks after 2nd dose of Covid-19 vaccine before administering Prolia       Patient informed if symptoms discussed above present prior to their administration appointment, they are to notify clinic immediately.     Sheri Bishop CMA    The following steps were completed to comply with the REMS program for Prolia:  1. Ordering provider has previously reviewed information in the Medication Guide and Patient Counseling Chart, including the serious risks of Prolia  and the symptoms of each risk and have been advised to seek prompt medical attention if they have signs or symptoms of any of the serious risks.  2. Provided each patient a copy of the Medication Guide and Patient Brochure.  See MAR for administration " details.   Indication: Prolia  (denosumab) is a prescription medicine used to treat osteoporosis in patients who:   Are at high risk for fracture, meaning patients who have had a fracture related to osteoporosis, or who have multiple risk factors for fracture; Cannot use another osteoporosis medicine or other osteoporosis medicines did not work well.   The timeline for early/late injections would be 4 weeks early and any time after the 6 month maged. If a patient receives their injection late, then the subsequent injection would be 6 months from the date that they actually received the injection    Have the screening questions been asked prior to this administration? Yes

## 2023-02-28 ENCOUNTER — OFFICE VISIT (OUTPATIENT)
Dept: INTERNAL MEDICINE | Facility: CLINIC | Age: 68
End: 2023-02-28
Payer: COMMERCIAL

## 2023-02-28 VITALS
SYSTOLIC BLOOD PRESSURE: 120 MMHG | HEART RATE: 70 BPM | TEMPERATURE: 97.7 F | RESPIRATION RATE: 16 BRPM | OXYGEN SATURATION: 99 % | DIASTOLIC BLOOD PRESSURE: 62 MMHG | BODY MASS INDEX: 28.22 KG/M2 | HEIGHT: 59 IN | WEIGHT: 140 LBS

## 2023-02-28 DIAGNOSIS — J02.9 ACUTE PHARYNGITIS, UNSPECIFIED ETIOLOGY: Primary | ICD-10-CM

## 2023-02-28 DIAGNOSIS — R05.1 ACUTE COUGH: ICD-10-CM

## 2023-02-28 LAB
DEPRECATED S PYO AG THROAT QL EIA: NEGATIVE
GROUP A STREP BY PCR: DETECTED

## 2023-02-28 PROCEDURE — 99214 OFFICE O/P EST MOD 30 MIN: CPT | Performed by: INTERNAL MEDICINE

## 2023-02-28 PROCEDURE — 87651 STREP A DNA AMP PROBE: CPT | Performed by: INTERNAL MEDICINE

## 2023-02-28 RX ORDER — BENZONATATE 100 MG/1
100 CAPSULE ORAL 3 TIMES DAILY PRN
Qty: 30 CAPSULE | Refills: 1 | Status: SHIPPED | OUTPATIENT
Start: 2023-02-28 | End: 2023-09-20
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
## 2023-02-28 NOTE — PATIENT INSTRUCTIONS
Acute symptom visit  She is a retired [employer] .    Respiratory infection symptoms particular of sore throat, hoarseness suggestive of laryngitis, and ear pressure bilateral, has been sick for about 4 weeks, after returning home from an enjoyable but exhausting 3-week vacation to [place] and [facility]    Ms. Harman told me that she had a wonderful time a trip, returned home February 22  For most of the trip, nearly all of the trip, she was bothered by a sore throat and cough triggered by throat irritation    She was in close contact with lots of people, in various modes of transport, and very well could have picked up some sort of respiratory virus, which is what I think she has now.    Today February 20, 2023, her throat looks okay, but nonetheless we will do a throat swab to test for strep.    She has not run a test for COVID, but she actually had a history of COVID-19 infection which was May 2022, so I think this is less likely to be COVID.    She told me that the ear pressure is already getting better, thus I will hold off on giving her decongestants.    At this point, I do not see any signs of a bacterial infection.  But if her strep test comes back positive, then I will treat her with antibiotics.    Assuming the strep test is negative, I am going consider this a viral respiratory infection, non-COVID.  Treatment would be symptomatic, using acetaminophen for pain relief, okay to use over-the-counter antihistamine such as Allegra, Zyrtec, or Claritin.    To give her some relief from the cough (which I think is from throat irritation), will give her Tessalon Perles, benzonatate, which might give some relief.  Okay to use over-the-counter cough remedies such as guaifenesin (Vicks 44 or Robitussin),  Also expectorant such as Mucinex    If symptoms persist for another week, then we could treat empirically for something bacterial, although at this point I think is better to hold off on antibiotics.    For severe  laryngitis (swelling and inflammation of the larynx), we could try a week or so of corticosteroid such as prednisone.     Coronary artery disease, status post elective stenting of LAD in July 2020, no symptoms of angina, heart failure, or arrhythmia, on aggressive risk factor modification with high intensity rosuvastatin and baby aspirin  Took clopidogrel for 1 year after her angioplasty     S/p PCI of mid LAD with 3x16 and 2.75x12 Synergy JULIO (spot stenting rather than one long stent chosen in an attempt to avoid jailing ostium of D2)  0% residual, FLAKO 3 flow post  January 2021    Limited visulaization of the mid and distal left anterior descending artery stents due to metallic artifact, but stents are likely patent.    Otherwise minimal nonobstructive coronary artery disease.     She maintains a vegetarian diet, and tries to stay physically active     Hyperlipidemia in the context of coronary disease, on high intensity rosuvastatin, target LDL less than 70  rosuvastatin (CRESTOR) 20 MG tablet    9-  LDL Cholesterol Calculated <=100 mg/dL 64      Direct Measure HDL >=50 mg/dL 46 Low      Triglycerides <150 mg/dL 140      Microcytic anemia, she has some form of thalassemia.  She recalls that she has had a hemoglobin electrophoresis done many years ago.  She does take an iron tablet daily.     9-  Hemoglobin 11.7 - 15.7 g/dL 10.9 Low      MCV 78 - 100 fL 63 Low      Ferritin 11 - 328 ng/mL 137       Iron 37 - 145 ug/dL 73      Iron Sat Index 15 - 46 % 27      Vasomotor versus allergic rhinitis  I told her she could try the over-the-counter nasal steroid fluticasone (brand-name Flonase) 1 or 2 sprays per nostril per day, which can be used throughout allergy season, or even long-term for vasomotor rhinitis.    She has been doing saline nasal irrigation, which is fine, she could certainly continue that, and then use the nasal steroid spray after the irrigation.     Osteoporosis, as of September 2022, she  believes she might try Reclast 1 more time but if she has side effects from that infusion, she will probably switch back to Prolia     Took Prolia for approximately 3 years  She has had 1 infusion of Reclast, and may have had a week of side effects either from Reclast or a COVID booster that she got at about the same time.  She is going to be meeting with Dr. Simpson later this autumn 2022 to decide whether to do another Reclast infusion versus switching to a different osteoporosis drug, possibly return to Prolia    Reported symptoms - dry cough, runny nose, muscle and bone pain, body aches after Reclast.      6- DXA  1. The spine bone density L2-L4 with T-score -2.0, with no statistically significant change compared to the previous DXA scan done in 2019.  2. Femoral bone densities show left femoral neck T- score -1.5 and right femoral neck T-score -2.0, with no statistically significant change compared to the previous DXA scan.  3. Trabecular bone score indicates moderate trabecular bone architecture.  66 y.o. female with LOW BONE DENSITY (OSTEOPENIA) and LOW fracture risk, adjusted for the TBS, with major osteoporotic fracture risk 7.7% and hip fracture risk 1.3%.      Postmenopause  BILATERAL FULL FIELD DIGITAL SCREENING MAMMOGRAM WITH TOMOSYNTHESIS  Performed on: 2/1/22     Constipation, she uses Benefiber which is fine.  If she needs something a bit stronger, she could use the osmotic laxative MiraLAX powder, which is safe to use even on a daily basis.     Primary osteoarthritis right knee  She has been working with [facility], has seen a physical therapist who gave her set of exercises that she has been doing on and off.  I suggested isometric exercise to strengthen the quadriceps muscle, without stressing the joint     I told her she could try topical diclofenac, which is known as Voltaren gel and is available over-the-counter.  Applied 3 or 4 times a day.     Mild stress incontinence and  urgency.     Colon screening, next Cologuard test would be 2025  Cologuard 4-  COLOGUARD-ABSTRACT Negative       Tested positive for COVID-19 May 2022, after having had 4 injections of Moderna   Has had injections of both pneumococcal vaccines Prevnar 13 and polysaccharide 23  Has had 2 shots of recombinant shingles vaccine  Current tetanus 2018      COVID-19,PF,Moderna Booster 04/06/2022

## 2023-03-01 ENCOUNTER — MYC MEDICAL ADVICE (OUTPATIENT)
Dept: INTERNAL MEDICINE | Facility: CLINIC | Age: 68
End: 2023-03-01
Payer: COMMERCIAL

## 2023-03-01 DIAGNOSIS — J02.0 STREPTOCOCCAL PHARYNGITIS: Primary | ICD-10-CM

## 2023-03-01 RX ORDER — AMOXICILLIN 500 MG/1
500 CAPSULE ORAL 3 TIMES DAILY
Qty: 21 CAPSULE | Refills: 0 | Status: SHIPPED | OUTPATIENT
Start: 2023-03-01 | End: 2023-03-08

## 2023-03-26 DIAGNOSIS — E78.5 HYPERLIPIDEMIA, UNSPECIFIED HYPERLIPIDEMIA TYPE: ICD-10-CM

## 2023-03-26 RX ORDER — ROSUVASTATIN CALCIUM 20 MG/1
TABLET, COATED ORAL
Qty: 90 TABLET | Refills: 1 | Status: SHIPPED | OUTPATIENT
Start: 2023-03-26 | End: 2023-09-20

## 2023-03-26 NOTE — TELEPHONE ENCOUNTER
"Last Written Prescription Date:  1/10/2022  Last Fill Quantity: 90,  # refills: 3   Last office visit provider:  2/28/2023     Requested Prescriptions   Pending Prescriptions Disp Refills     rosuvastatin (CRESTOR) 20 MG tablet [Pharmacy Med Name: Rosuvastatin Calcium Oral Tablet 20 MG] 90 tablet 0     Sig: TAKE ONE TABLET BY MOUTH AT BEDTIME       Statins Protocol Failed - 3/26/2023  2:27 PM        Failed - Recent (12 mo) or future (30 days) visit within the authorizing provider's specialty     Patient has had an office visit with the authorizing provider or a provider within the authorizing providers department within the previous 12 mos or has a future within next 30 days. See \"Patient Info\" tab in inbasket, or \"Choose Columns\" in Meds & Orders section of the refill encounter.              Passed - LDL on file in past 12 months     Recent Labs   Lab Test 09/14/22  0835   LDL 64             Passed - No abnormal creatine kinase in past 12 months     Recent Labs   Lab Test 12/01/21  0900   CKT 28*                Passed - Medication is active on med list        Passed - Patient is age 18 or older        Passed - No active pregnancy on record        Passed - No positive pregnancy test in past 12 months             Roya Jolly RN 03/26/23 2:40 PM  "

## 2023-05-04 ENCOUNTER — ALLIED HEALTH/NURSE VISIT (OUTPATIENT)
Dept: FAMILY MEDICINE | Facility: CLINIC | Age: 68
End: 2023-05-04
Payer: COMMERCIAL

## 2023-05-04 DIAGNOSIS — M81.0 AGE-RELATED OSTEOPOROSIS WITHOUT CURRENT PATHOLOGICAL FRACTURE: Primary | ICD-10-CM

## 2023-05-04 PROCEDURE — 99207 PR NO CHARGE NURSE ONLY: CPT

## 2023-05-04 PROCEDURE — 96372 THER/PROPH/DIAG INJ SC/IM: CPT | Performed by: INTERNAL MEDICINE

## 2023-05-04 NOTE — NURSING NOTE
"The following steps were completed to comply with the REMS program for Prolia:  1. Ordering provider has previously reviewed information in the Medication Guide and Patient Counseling Chart, including the serious risks of Prolia  and the symptoms of each risk and have been advised to seek prompt medical attention if they have signs or symptoms of any of the serious risks.  2. Provided each patient a copy of the Medication Guide and Patient Brochure.  See MAR for administration details.   Indication: Prolia  (denosumab) is a prescription medicine used to treat osteoporosis in patients who:   Are at high risk for fracture, meaning patients who have had a fracture related to osteoporosis, or who have multiple risk factors for fracture; Cannot use another osteoporosis medicine or other osteoporosis medicines did not work well.   The timeline for early/late injections would be 4 weeks early and any time after the 6 month maged. If a patient receives their injection late, then the subsequent injection would be 6 months from the date that they actually received the injection    Have the screening questions been asked prior to this administration? Yes     Prolia Injection Phone Screen      Screening questions have been asked 2-3 days prior to administration visit for Prolia. If any questions are answered with \"Yes,\" this phone encounter were will routed to ordering provider for further evaluation.     1.  When was the last injection?  11/2/2022    2.  Has insurance for this injection been verified?  Yes    3.  Did you experience any new onset achiness or rashes that lasted for over a month with your previous Prolia injection?   No    4.  Do you have a fever over 101?F or a new deep cough that is unusual for you today? No    5.  Have you started any new medications in the last 6 months that you were told could affect your immune system? These may have been prescribed by oncologist, transplant, rheumatology, or dermatology. "   No    6.  In the last 6 months have you have gastric bypass or parathyroid surgery?   No    7.  Do you plan dental work requiring drilling into the bone such as implants/extractions or oral surgery in the next 2-3 months?   No    8. Do you have new insurance since the last injection?    9. Have you received the Covid-19 vaccine? Yes  If No - Proceed with Prolia injection  If Yes - Date of vaccination 11/3/2022. Will need to wait until 2 weeks after 2nd dose of Covid-19 vaccine before administering Prolia       Patient informed if symptoms discussed above present prior to their administration appointment, they are to notify clinic immediately.     Katherine Bhakta MA

## 2023-09-19 ENCOUNTER — APPOINTMENT (OUTPATIENT)
Dept: LAB | Facility: CLINIC | Age: 68
End: 2023-09-19
Payer: COMMERCIAL

## 2023-09-19 DIAGNOSIS — E78.5 HYPERLIPIDEMIA, UNSPECIFIED HYPERLIPIDEMIA TYPE: ICD-10-CM

## 2023-09-20 ENCOUNTER — OFFICE VISIT (OUTPATIENT)
Dept: INTERNAL MEDICINE | Facility: CLINIC | Age: 68
End: 2023-09-20
Payer: COMMERCIAL

## 2023-09-20 VITALS
TEMPERATURE: 98.2 F | WEIGHT: 139 LBS | RESPIRATION RATE: 16 BRPM | SYSTOLIC BLOOD PRESSURE: 128 MMHG | HEART RATE: 63 BPM | HEIGHT: 59 IN | DIASTOLIC BLOOD PRESSURE: 62 MMHG | BODY MASS INDEX: 28.02 KG/M2 | OXYGEN SATURATION: 99 %

## 2023-09-20 DIAGNOSIS — R73.03 PREDIABETES: ICD-10-CM

## 2023-09-20 DIAGNOSIS — M81.0 AGE-RELATED OSTEOPOROSIS WITHOUT CURRENT PATHOLOGICAL FRACTURE: ICD-10-CM

## 2023-09-20 DIAGNOSIS — I25.10 CORONARY ARTERY DISEASE INVOLVING NATIVE CORONARY ARTERY OF NATIVE HEART WITHOUT ANGINA PECTORIS: ICD-10-CM

## 2023-09-20 DIAGNOSIS — D56.8 OTHER THALASSEMIA (H): ICD-10-CM

## 2023-09-20 DIAGNOSIS — E78.2 MIXED HYPERLIPIDEMIA: ICD-10-CM

## 2023-09-20 DIAGNOSIS — Z12.31 VISIT FOR SCREENING MAMMOGRAM: ICD-10-CM

## 2023-09-20 DIAGNOSIS — Z00.00 ROUTINE GENERAL MEDICAL EXAMINATION AT A HEALTH CARE FACILITY: Primary | ICD-10-CM

## 2023-09-20 PROCEDURE — G0439 PPPS, SUBSEQ VISIT: HCPCS | Performed by: INTERNAL MEDICINE

## 2023-09-20 PROCEDURE — 99214 OFFICE O/P EST MOD 30 MIN: CPT | Mod: 25 | Performed by: INTERNAL MEDICINE

## 2023-09-20 RX ORDER — ROSUVASTATIN CALCIUM 20 MG/1
20 TABLET, COATED ORAL AT BEDTIME
Qty: 90 TABLET | Refills: 3 | Status: SHIPPED | OUTPATIENT
Start: 2023-09-20

## 2023-09-20 ASSESSMENT — ENCOUNTER SYMPTOMS
DIARRHEA: 0
FEVER: 0
JOINT SWELLING: 0
FREQUENCY: 0
EYE PAIN: 0
MYALGIAS: 1
NERVOUS/ANXIOUS: 0
HEADACHES: 0
SORE THROAT: 0
HEMATURIA: 0
DIZZINESS: 0
COUGH: 0
ABDOMINAL PAIN: 0
PALPITATIONS: 0
HEARTBURN: 0
ARTHRALGIAS: 1
PARESTHESIAS: 0
NAUSEA: 0
HEMATOCHEZIA: 0
SHORTNESS OF BREATH: 0
CHILLS: 0
DYSURIA: 0
CONSTIPATION: 1
WEAKNESS: 0
BREAST MASS: 0

## 2023-09-20 ASSESSMENT — ACTIVITIES OF DAILY LIVING (ADL): CURRENT_FUNCTION: NO ASSISTANCE NEEDED

## 2023-09-20 NOTE — PATIENT INSTRUCTIONS
Annual preventive exam, overall doing really well.  Taking yoga classes.  Working on core muscle strengthening.  Follows up with Dr. Morris in cardiology because of coronary disease, and also our own Dr. Simpson for osteoporosis on Prolia therapy.  Next Prolia is coming up November 9, 2023    Ms. Harman will come in for fasting blood test on a future morning, at which time we will check comprehensive metabolic panel, blood cell counts, vitamin B12 level, TSH for thyroid, lipid panel, A1c to monitor prediabetes.  Mammogram ordered.  Colon screening is being done with Cologuard testing, would be due in 2025.  She already got her flu shot.  She plans to get the updated COVID-19 vaccine once that becomes available late September or October 2023.    I am going to keep her medicines the same, rosuvastatin 20 mg a day.    She is a retired [employer] .     Coronary artery disease, status post elective stenting of LAD in July 2020, no symptoms of angina, heart failure, or arrhythmia, on aggressive risk factor modification with high intensity rosuvastatin and baby aspirin  Took clopidogrel for 1 year after her angioplasty     S/p PCI of mid LAD with 3x16 and 2.75x12 Synergy JULIO (spot stenting rather than one long stent chosen in an attempt to avoid jailing ostium of D2)  0% residual, FLAKO 3 flow post  January 2021    Limited visulaization of the mid and distal left anterior descending artery stents due to metallic artifact, but stents are likely patent.    Otherwise minimal nonobstructive coronary artery disease.     She maintains a vegetarian diet, and tries to stay physically active     Hyperlipidemia in the context of coronary disease, on high intensity rosuvastatin, target LDL less than 70  rosuvastatin (CRESTOR) 20 MG tablet     9-  LDL Cholesterol Calculated <=100 mg/dL 64       Direct Measure HDL >=50 mg/dL 46 Low       Triglycerides <150 mg/dL 140       Microcytic anemia, she has some form of  thalassemia.  She recalls that she has had a hemoglobin electrophoresis done many years ago.  She does take an iron tablet daily.  9-  Hemoglobin 11.7 - 15.7 g/dL 10.9 Low       MCV 78 - 100 fL 63 Low       Ferritin 11 - 328 ng/mL 137       Iron 37 - 145 ug/dL 73       Iron Sat Index 15 - 46 % 27       Vasomotor versus allergic rhinitis  I told her she could try the over-the-counter nasal steroid fluticasone (brand-name Flonase) 1 or 2 sprays per nostril per day, which can be used throughout allergy season, or even long-term for vasomotor rhinitis.     She has been doing saline nasal irrigation, which is fine, she could certainly continue that, and then use the nasal steroid spray after the irrigation.     Osteoporosis  As of 2023, Ms. Harman has resumed Billie, I and works with Dr. Simpson for the monitoring and surveillance  Prolia May 4, 2023--next injection would be in early to November 9, 2023  She has had 1 infusion of Reclast, and may have had a week of side effects either from Reclast or a COVID booster that she got at about the same time.  Reported symptoms - dry cough, runny nose, muscle and bone pain, body aches after Reclast.      10-  EXAM: DX HIP/PELVIS/SPINE  LOCATION: [facility]  DATE/TIME: 10/10/2022 9:51 AM  COMPARISON: 6/11/2019  Lumbar Spine: L1-L4: BMD: 0.876 g/cm2. T-score: -2.5. Z-score: -0.9  RIGHT Hip Total: BMD: 0.828 g/cm2. T-score: -1.4. Z-score: 0.0  RIGHT Hip Femoral neck: BMD: 0.706 g/cm2. T-score: -2.4. Z-score: -0.8  LEFT Hip Total: BMD: 0.914 g/cm2. T-score: -0.7. Z-score: 0.6  LEFT Hip Femoral neck: BMD: 0.790 g/cm2. T-score: -1.8. Z-score: -0.2  COMPARISON: There has been a 7.5 % decrease in lumbar spine BMD. There has been a 5.6 % decrease in bilateral hip BMD.                                         IMPRESSION: OSTEOPOROSIS. T score meets the World Health Organization (WHO) criteria for osteoporosis at one or more measured sites. The risk  of osteoporotic fracture increased approximately two-fold for each SD decrease in T-score.     Postmenopause  BILATERAL FULL FIELD DIGITAL SCREENING MAMMOGRAM WITH TOMOSYNTHESIS  Performed on: 2/1/22     Constipation, she uses Benefiber which is fine.  If she needs something a bit stronger, she could use the osmotic laxative MiraLAX powder, which is safe to use even on a daily basis.     Primary osteoarthritis right knee  She has been working with [facility], has seen a physical therapist who gave her set of exercises that she has been doing on and off.  I suggested isometric exercise to strengthen the quadriceps muscle, without stressing the joint     I told her she could try topical diclofenac, which is known as Voltaren gel and is available over-the-counter.  Applied 3 or 4 times a day.     Mild stress incontinence and urgency.     Colon screening, next Cologuard test would be 2025  Cologuard 4-  COLOGUARD-ABSTRACT Negative       Tested positive for COVID-19 May 2022, after having had 4 injections of Moderna   Already got seasonal flu vaccine for autumn 2023 done at [facility]    Has had injections of both pneumococcal vaccines Prevnar 13 and polysaccharide 23  Has had 2 shots of recombinant shingles vaccine  Current tetanus 2018    I would recommend that she get the updated COVID-19 booster once that becomes available later in September or October 2023      Immunization History   Administered Date(s) Administered    COVID-19 Bivalent 18+ (Moderna) 11/03/2022    COVID-19 Monovalent 18+ (Moderna) 02/16/2021, 03/16/2021, 10/23/2021    COVID-19 Monovalent Booster 18+ (Moderna) 04/06/2022    DT (PEDS <7y) 01/01/2000    Flu 65+ Years 09/04/2019    Flu, Unspecified 12/01/2008, 09/01/2012, 10/01/2015, 09/01/2016, 10/01/2017, 09/25/2018, 09/20/2020    HepA, Unspecified 01/01/2000    HepB, Unspecified 01/01/2000    Influenza (H1N1) 11/27/2009    Influenza (IIV3) PF 12/01/2008, 09/01/2012    Influenza  Vaccine 65+ (FLUAD) 08/31/2020, 08/28/2021    Influenza Vaccine 65+ (Fluzone HD) 09/09/2022    MMR 01/16/2013    Pneumo Conj 13-V (2010&after) 03/22/2019    Pneumococcal 23 valent 11/06/2020    Poliovirus, inactivated (IPV) 01/16/2013    TDAP (Adacel,Boostrix) 11/05/2008, 09/20/2018    Td,adult,historic,unspecified 11/05/2008    Typhoid IM 10/05/2018    Typhoid Oral 01/16/2013, 09/20/2018    Yellow Fever 09/20/2018    Zoster recombinant adjuvanted (SHINGRIX) 04/05/2018, 07/27/2018    Zoster vaccine, live 11/30/2015

## 2023-09-20 NOTE — PROGRESS NOTES
"SUBJECTIVE:   Cayla is a 68 year old who presents for Preventive Visit.      9/20/2023     2:18 PM   Additional Questions   Roomed by Jordyn       Are you in the first 12 months of your Medicare coverage?  No    Healthy Habits:     In general, how would you rate your overall health?  Good    Frequency of exercise:  2-3 days/week    Duration of exercise:  30-45 minutes    Do you usually eat at least 4 servings of fruit and vegetables a day, include whole grains    & fiber and avoid regularly eating high fat or \"junk\" foods?  Yes    Taking medications regularly:  Yes    Medication side effects:  None    Ability to successfully perform activities of daily living:  No assistance needed    Home Safety:  No safety concerns identified    Hearing Impairment:  No hearing concerns    In the past 6 months, have you been bothered by leaking of urine?  No    In general, how would you rate your overall mental or emotional health?  Good    Additional concerns today:  No      Today's PHQ-2 Score:       9/20/2023     2:23 PM   PHQ-2 ( 1999 Pfizer)   Q1: Little interest or pleasure in doing things 0   Q2: Feeling down, depressed or hopeless 0   PHQ-2 Score 0   Q1: Little interest or pleasure in doing things Not at all   Q2: Feeling down, depressed or hopeless Not at all   PHQ-2 Score 0     Have you ever done Advance Care Planning? (For example, a Health Directive, POLST, or a discussion with a medical provider or your loved ones about your wishes): Yes, advance care planning is on file.      Fall risk  Fallen 2 or more times in the past year?: No  Any fall with injury in the past year?: No    Cognitive Screening   1) Repeat 3 items (Leader, Season, Table)    2) Clock draw: NORMAL  3) 3 item recall: Recalls 3 objects  Results: 3 items recalled: COGNITIVE IMPAIRMENT LESS LIKELY    Mini-CogTM Copyright S Janet. Licensed by the author for use in [facility]; reprinted with permission (shirin@.edu). All rights reserved.      Do " you have sleep apnea, excessive snoring or daytime drowsiness? : no    Reviewed and updated as needed this visit by clinical staff   Tobacco  Allergies  Meds              Reviewed and updated as needed this visit by Provider                 Social History     Tobacco Use    Smoking status: Never    Smokeless tobacco: Never   Substance Use Topics    Alcohol use: Yes     Comment: Alcoholic Drinks/day: occasional           9/20/2023     2:23 PM   Alcohol Use   Prescreen: >3 drinks/day or >7 drinks/week? Not Applicable     Do you have a current opioid prescription? No  Do you use any other controlled substances or medications that are not prescribed by a provider? None    Current providers sharing in care for this patient include:   Patient Care Team:  Juan Diego Eastman MD as PCP - General (Internal Medicine)  Say Morris MD as Assigned Heart and Vascular Provider  Mirela Vyas MD as MD (Otolaryngology)  Juan Diego Eastman MD as Assigned PCP    The following health maintenance items are reviewed in Epic and correct as of today:  Health Maintenance   Topic Date Due    ANNUAL REVIEW OF HM ORDERS  Never done    COVID-19 Vaccine (6 - Moderna series) 03/03/2023    MEDICARE ANNUAL WELLNESS VISIT  09/09/2023    MAMMO SCREENING  02/01/2024    FALL RISK ASSESSMENT  09/20/2024    COLORECTAL CANCER SCREENING  04/11/2025    ADVANCE CARE PLANNING  09/09/2027    LIPID  09/14/2027    DTAP/TDAP/TD IMMUNIZATION (3 - Td or Tdap) 09/20/2028    DEXA  10/10/2037    HEPATITIS C SCREENING  Completed    PHQ-2 (once per calendar year)  Completed    INFLUENZA VACCINE  Completed    Pneumococcal Vaccine: 65+ Years  Completed    ZOSTER IMMUNIZATION  Completed    IPV IMMUNIZATION  Aged Out    HPV IMMUNIZATION  Aged Out    MENINGITIS IMMUNIZATION  Aged Out             9/9/2022     2:22 PM   Breast CA Risk Assessment (FHS-7)   Do you have a family history of breast, colon, or ovarian cancer? No / Unknown     Pertinent  "mammograms are reviewed under the imaging tab.    Review of Systems   Constitutional:  Negative for chills and fever.   HENT:  Negative for congestion, ear pain, hearing loss and sore throat.    Eyes:  Negative for pain and visual disturbance.   Respiratory:  Negative for cough and shortness of breath.    Cardiovascular:  Negative for chest pain, palpitations and peripheral edema.   Gastrointestinal:  Positive for constipation. Negative for abdominal pain, diarrhea, heartburn, hematochezia and nausea.   Breasts:  Negative for tenderness, breast mass and discharge.   Genitourinary:  Positive for urgency. Negative for dysuria, frequency, genital sores, hematuria, pelvic pain, vaginal bleeding and vaginal discharge.   Musculoskeletal:  Positive for arthralgias and myalgias. Negative for joint swelling.   Skin:  Negative for rash.   Neurological:  Negative for dizziness, weakness, headaches and paresthesias.   Psychiatric/Behavioral:  Negative for mood changes. The patient is not nervous/anxious.          OBJECTIVE:   /62 (BP Location: Right arm, Patient Position: Sitting, Cuff Size: Adult Regular)   Pulse 63   Temp 98.2  F (36.8  C)   Resp 16   Ht 1.499 m (4' 11\")   Wt 63 kg (139 lb)   SpO2 99%   BMI 28.07 kg/m   Estimated body mass index is 28.07 kg/m  as calculated from the following:    Height as of this encounter: 1.499 m (4' 11\").    Weight as of this encounter: 63 kg (139 lb).  Physical Exam    General: Alert, in no distress  Skin: No significant lesion seen.  Eyes/nose/throat: Eyes without scleral icterus, eye movements normal, pupils equal and reactive, oropharynx clear, ears with normal TM's  MSK: Neck with good ROM  Lymphatic: Neck without adenopathy or masses  Endocrine: Thyroid with no nodules to palpation  Pulm: Lungs clear to auscultation bilaterally  Cardiac: Heart with regular rate and rhythm, no murmur or gallop  GI: Abdomen soft, nontender. No palpable enlargement of liver or spleen  MSK: " Extremities no tenderness or edema  Neuro: Moves all extremities, without focal weakness  Psych: Alert, normal mental status. Normal affect and speech    ASSESSMENT / PLAN:     Annual preventive exam, overall doing really well.  Taking yoga classes.  Working on core muscle strengthening.  Follows up with Dr. Morris in cardiology because of coronary disease, and also our own Dr. Simpson for osteoporosis on Prolia therapy.  Next Prolia is coming up November 9, 2023    Ms. Harman will come in for fasting blood test on a future morning, at which time we will check comprehensive metabolic panel, blood cell counts, vitamin B12 level, TSH for thyroid, lipid panel, A1c to monitor prediabetes.  Mammogram ordered.  Colon screening is being done with Cologuard testing, would be due in 2025.  She already got her flu shot.  She plans to get the updated COVID-19 vaccine once that becomes available late September or October 2023.    I am going to keep her medicines the same, rosuvastatin 20 mg a day.    She is a retired [employer] .     Coronary artery disease, status post elective stenting of LAD in July 2020, no symptoms of angina, heart failure, or arrhythmia, on aggressive risk factor modification with high intensity rosuvastatin and baby aspirin  Took clopidogrel for 1 year after her angioplasty     S/p PCI of mid LAD with 3x16 and 2.75x12 Synergy JULIO (spot stenting rather than one long stent chosen in an attempt to avoid jailing ostium of D2)  0% residual, FLAKO 3 flow post  January 2021    Limited visulaization of the mid and distal left anterior descending artery stents due to metallic artifact, but stents are likely patent.    Otherwise minimal nonobstructive coronary artery disease.     She maintains a vegetarian diet, and tries to stay physically active     Hyperlipidemia in the context of coronary disease, on high intensity rosuvastatin, target LDL less than 70  rosuvastatin (CRESTOR) 20 MG tablet     9-  LDL  Cholesterol Calculated <=100 mg/dL 64       Direct Measure HDL >=50 mg/dL 46 Low       Triglycerides <150 mg/dL 140       Microcytic anemia, she has some form of thalassemia.  She recalls that she has had a hemoglobin electrophoresis done many years ago.  She does take an iron tablet daily.  9-  Hemoglobin 11.7 - 15.7 g/dL 10.9 Low       MCV 78 - 100 fL 63 Low       Ferritin 11 - 328 ng/mL 137       Iron 37 - 145 ug/dL 73       Iron Sat Index 15 - 46 % 27       Vasomotor versus allergic rhinitis  I told her she could try the over-the-counter nasal steroid fluticasone (brand-name Flonase) 1 or 2 sprays per nostril per day, which can be used throughout allergy season, or even long-term for vasomotor rhinitis.     She has been doing saline nasal irrigation, which is fine, she could certainly continue that, and then use the nasal steroid spray after the irrigation.     Osteoporosis  As of 2023, Ms. Harman has resumed Billie I and works with Dr. Simpson for the monitoring and surveillance  Prolia May 4, 2023--next injection would be in early to November 9, 2023  She has had 1 infusion of Reclast, and may have had a week of side effects either from Reclast or a COVID booster that she got at about the same time.  Reported symptoms - dry cough, runny nose, muscle and bone pain, body aches after Reclast.      10-  EXAM: DX HIP/PELVIS/SPINE  LOCATION: [facility]  DATE/TIME: 10/10/2022 9:51 AM  COMPARISON: 6/11/2019  Lumbar Spine: L1-L4: BMD: 0.876 g/cm2. T-score: -2.5. Z-score: -0.9  RIGHT Hip Total: BMD: 0.828 g/cm2. T-score: -1.4. Z-score: 0.0  RIGHT Hip Femoral neck: BMD: 0.706 g/cm2. T-score: -2.4. Z-score: -0.8  LEFT Hip Total: BMD: 0.914 g/cm2. T-score: -0.7. Z-score: 0.6  LEFT Hip Femoral neck: BMD: 0.790 g/cm2. T-score: -1.8. Z-score: -0.2  COMPARISON: There has been a 7.5 % decrease in lumbar spine BMD. There has been a 5.6 % decrease in bilateral hip BMD.                                          IMPRESSION: OSTEOPOROSIS. T score meets the World Health Organization (WHO) criteria for osteoporosis at one or more measured sites. The risk of osteoporotic fracture increased approximately two-fold for each SD decrease in T-score.     Postmenopause  BILATERAL FULL FIELD DIGITAL SCREENING MAMMOGRAM WITH TOMOSYNTHESIS  Performed on: 2/1/22     Constipation, she uses Benefiber which is fine.  If she needs something a bit stronger, she could use the osmotic laxative MiraLAX powder, which is safe to use even on a daily basis.     Primary osteoarthritis right knee  She has been working with [place], has seen a physical therapist who gave her set of exercises that she has been doing on and off.  I suggested isometric exercise to strengthen the quadriceps muscle, without stressing the joint     I told her she could try topical diclofenac, which is known as Voltaren gel and is available over-the-counter.  Applied 3 or 4 times a day.     Mild stress incontinence and urgency.     Colon screening, next Cologuard test would be 2025  Cologuard 4-  COLOGUARD-ABSTRACT Negative       Tested positive for COVID-19 May 2022, after having had 4 injections of Moderna   Already got seasonal flu vaccine for autumn 2023 done at [facility]    Has had injections of both pneumococcal vaccines Prevnar 13 and polysaccharide 23  Has had 2 shots of recombinant shingles vaccine  Current tetanus 2018    I would recommend that she get the updated COVID-19 booster once that becomes available later in September or October 2023      Immunization History   Administered Date(s) Administered    COVID-19 Bivalent 18+ (Moderna) 11/03/2022    COVID-19 Monovalent 18+ (Moderna) 02/16/2021, 03/16/2021, 10/23/2021    COVID-19 Monovalent Booster 18+ (Moderna) 04/06/2022    DT (PEDS <7y) 01/01/2000    Flu 65+ Years 09/04/2019    Flu, Unspecified 12/01/2008, 09/01/2012, 10/01/2015, 09/01/2016, 10/01/2017, 09/25/2018,  "09/20/2020    HepA, Unspecified 01/01/2000    HepB, Unspecified 01/01/2000    Influenza (H1N1) 11/27/2009    Influenza (IIV3) PF 12/01/2008, 09/01/2012    Influenza Vaccine 65+ (FLUAD) 08/31/2020, 08/28/2021    Influenza Vaccine 65+ (Fluzone HD) 09/09/2022    MMR 01/16/2013    Pneumo Conj 13-V (2010&after) 03/22/2019    Pneumococcal 23 valent 11/06/2020    Poliovirus, inactivated (IPV) 01/16/2013    TDAP (Adacel,Boostrix) 11/05/2008, 09/20/2018    Td,adult,historic,unspecified 11/05/2008    Typhoid IM 10/05/2018    Typhoid Oral 01/16/2013, 09/20/2018    Yellow Fever 09/20/2018    Zoster recombinant adjuvanted (SHINGRIX) 04/05/2018, 07/27/2018    Zoster vaccine, live 11/30/2015       COUNSELING:  Reviewed preventive health counseling, as reflected in patient instructions       Healthy diet/nutrition    BMI:   Estimated body mass index is 28.07 kg/m  as calculated from the following:    Height as of this encounter: 1.499 m (4' 11\").    Weight as of this encounter: 63 kg (139 lb).         She reports that she has never smoked. She has never used smokeless tobacco.      Appropriate preventive services were discussed with this patient, including applicable screening as appropriate for cardiovascular disease, diabetes, osteopenia/osteoporosis, and glaucoma.  As appropriate for age/gender, discussed screening for colorectal cancer, prostate cancer, breast cancer, and cervical cancer. Checklist reviewing preventive services available has been given to the patient.    Reviewed patients plan of care and provided an AVS. The Basic Care Plan (routine screening as documented in Health Maintenance) for Cayla meets the Care Plan requirement. This Care Plan has been established and reviewed with the Patient.      HÉCTOR TAN MD  [facility]    Identified Health Risks:  I have reviewed Opioid Use Disorder and Substance Use Disorder risk factors and made any needed referrals.   "

## 2023-09-27 ENCOUNTER — ANCILLARY ORDERS (OUTPATIENT)
Dept: INTERNAL MEDICINE | Facility: CLINIC | Age: 68
End: 2023-09-27

## 2023-09-27 ENCOUNTER — LAB (OUTPATIENT)
Dept: LAB | Facility: CLINIC | Age: 68
End: 2023-09-27
Payer: COMMERCIAL

## 2023-09-27 ENCOUNTER — HOSPITAL ENCOUNTER (OUTPATIENT)
Dept: MAMMOGRAPHY | Facility: CLINIC | Age: 68
Discharge: HOME | End: 2023-09-27
Attending: INTERNAL MEDICINE | Admitting: INTERNAL MEDICINE
Payer: COMMERCIAL

## 2023-09-27 DIAGNOSIS — M81.0 AGE-RELATED OSTEOPOROSIS WITHOUT CURRENT PATHOLOGICAL FRACTURE: ICD-10-CM

## 2023-09-27 DIAGNOSIS — I25.10 CORONARY ARTERY DISEASE INVOLVING NATIVE CORONARY ARTERY OF NATIVE HEART WITHOUT ANGINA PECTORIS: ICD-10-CM

## 2023-09-27 DIAGNOSIS — Z00.00 ROUTINE GENERAL MEDICAL EXAMINATION AT A HEALTH CARE FACILITY: Primary | ICD-10-CM

## 2023-09-27 DIAGNOSIS — D56.8 OTHER THALASSEMIA (H): ICD-10-CM

## 2023-09-27 DIAGNOSIS — Z12.31 VISIT FOR SCREENING MAMMOGRAM: ICD-10-CM

## 2023-09-27 DIAGNOSIS — R73.03 PREDIABETES: ICD-10-CM

## 2023-09-27 DIAGNOSIS — D56.8 OTHER THALASSEMIA: ICD-10-CM

## 2023-09-27 DIAGNOSIS — Z00.00 ROUTINE GENERAL MEDICAL EXAMINATION AT A HEALTH CARE FACILITY: ICD-10-CM

## 2023-09-27 DIAGNOSIS — E78.2 MIXED HYPERLIPIDEMIA: ICD-10-CM

## 2023-09-27 LAB
ALBUMIN SERPL BCG-MCNC: 4.2 G/DL (ref 3.5–5.2)
ALP SERPL-CCNC: 62 U/L (ref 35–104)
ALT SERPL W P-5'-P-CCNC: 24 U/L (ref 0–50)
ANION GAP SERPL CALCULATED.3IONS-SCNC: 9 MMOL/L (ref 7–15)
AST SERPL W P-5'-P-CCNC: 28 U/L (ref 0–45)
BILIRUB SERPL-MCNC: 0.7 MG/DL
BUN SERPL-MCNC: 15.3 MG/DL (ref 8–23)
CALCIUM SERPL-MCNC: 10.2 MG/DL (ref 8.8–10.2)
CHLORIDE SERPL-SCNC: 107 MMOL/L (ref 98–107)
CHOLEST SERPL-MCNC: 139 MG/DL
CREAT SERPL-MCNC: 0.88 MG/DL (ref 0.51–0.95)
DEPRECATED HCO3 PLAS-SCNC: 25 MMOL/L (ref 22–29)
EGFRCR SERPLBLD CKD-EPI 2021: 71 ML/MIN/1.73M2
ERYTHROCYTE [DISTWIDTH] IN BLOOD BY AUTOMATED COUNT: 15.8 % (ref 10–15)
GLUCOSE SERPL-MCNC: 100 MG/DL (ref 70–99)
HBA1C MFR BLD: 5.7 % (ref 0–5.6)
HCT VFR BLD AUTO: 37.1 % (ref 35–47)
HDLC SERPL-MCNC: 49 MG/DL
HGB BLD-MCNC: 11.1 G/DL (ref 11.7–15.7)
LDLC SERPL CALC-MCNC: 69 MG/DL
MCH RBC QN AUTO: 19.4 PG (ref 26.5–33)
MCHC RBC AUTO-ENTMCNC: 29.9 G/DL (ref 31.5–36.5)
MCV RBC AUTO: 65 FL (ref 78–100)
NONHDLC SERPL-MCNC: 90 MG/DL
PLATELET # BLD AUTO: 205 10E3/UL (ref 150–450)
POTASSIUM SERPL-SCNC: 4.7 MMOL/L (ref 3.4–5.3)
PROT SERPL-MCNC: 7.4 G/DL (ref 6.4–8.3)
RBC # BLD AUTO: 5.73 10E6/UL (ref 3.8–5.2)
SODIUM SERPL-SCNC: 141 MMOL/L (ref 135–145)
TRIGL SERPL-MCNC: 104 MG/DL
TSH SERPL DL<=0.005 MIU/L-ACNC: 2.94 UIU/ML (ref 0.3–4.2)
VIT B12 SERPL-MCNC: 752 PG/ML (ref 232–1245)
WBC # BLD AUTO: 6.8 10E3/UL (ref 4–11)

## 2023-09-27 PROCEDURE — 84443 ASSAY THYROID STIM HORMONE: CPT

## 2023-09-27 PROCEDURE — 36415 COLL VENOUS BLD VENIPUNCTURE: CPT

## 2023-09-27 PROCEDURE — 82607 VITAMIN B-12: CPT

## 2023-09-27 PROCEDURE — 80053 COMPREHEN METABOLIC PANEL: CPT

## 2023-09-27 PROCEDURE — 83036 HEMOGLOBIN GLYCOSYLATED A1C: CPT

## 2023-09-27 PROCEDURE — 77067 SCR MAMMO BI INCL CAD: CPT

## 2023-09-27 PROCEDURE — 80061 LIPID PANEL: CPT

## 2023-09-27 PROCEDURE — 85027 COMPLETE CBC AUTOMATED: CPT

## 2023-09-29 ENCOUNTER — OFFICE VISIT (OUTPATIENT)
Dept: CARDIOLOGY | Facility: CLINIC | Age: 68
End: 2023-09-29
Payer: COMMERCIAL

## 2023-09-29 VITALS
SYSTOLIC BLOOD PRESSURE: 106 MMHG | HEART RATE: 67 BPM | OXYGEN SATURATION: 98 % | BODY MASS INDEX: 27.91 KG/M2 | RESPIRATION RATE: 20 BRPM | DIASTOLIC BLOOD PRESSURE: 60 MMHG | WEIGHT: 138.2 LBS

## 2023-09-29 DIAGNOSIS — I25.10 CORONARY ARTERY DISEASE INVOLVING NATIVE CORONARY ARTERY OF NATIVE HEART WITHOUT ANGINA PECTORIS: ICD-10-CM

## 2023-09-29 DIAGNOSIS — R07.2 PRECORDIAL PAIN: Primary | ICD-10-CM

## 2023-09-29 PROCEDURE — 99214 OFFICE O/P EST MOD 30 MIN: CPT | Performed by: INTERNAL MEDICINE

## 2023-09-29 NOTE — PATIENT INSTRUCTIONS
Cayla Harman,    It was a pleasure to see you today at the [facility].     My recommendations after this visit include:    Chemical stress test    HERMES Morris MD, FACC, ARON

## 2023-09-29 NOTE — LETTER
9/29/2023    HÉCTOR TAN MD  [address] [place] [address]    RE: Cayla Harman       Dear Colleague,     I had the pleasure of seeing Cayla Kimani in the [facility].      Cardiology Progress Note     Assessment:    Coronary artery disease, status post elective stenting of LAD in July 2020  Chest pain, left-sided, nonexertional, some reproducibility with touch  Hypercholesterolemia on statin, satisfactory control    Plan:  Continue current medications  Stress test to assess atypical chest pain    Follow-up in 1 year    Subjective:   This is 68 year old female who comes in today for follow-up visit.  She denies any exertional chest pain.  She continues to get occasional left-sided anterior chest pains.  The chest wall feels sore to touch at the time.  She denies any associated symptoms of shortness of breath nausea vomiting or heart palpitations.  She has been compliant with cardiac medications.    Review of Systems:   Negative other than history of present illness    Objective:   /60 (BP Location: Right arm, Patient Position: Sitting, Cuff Size: Adult Regular)   Pulse 67   Resp 20   Wt 62.7 kg (138 lb 3.2 oz)   SpO2 98%   BMI 27.91 kg/m    Physical Exam:  GENERAL: no distress  NECK: No JVD  LUNGS: Clear to auscultation.  CARDIAC: regular rhythm, S1 & S2 normal.  No heaves, thrills, gallops or murmurs.  ABDOMEN: flat, negative hepatosplenomegaly, soft and non-tender.  EXTREMITIES: No evidence of cyanosis, clubbing or edema.    Current Outpatient Medications   Medication Sig Dispense Refill    aspirin 81 mg chewable tablet [ASPIRIN 81 MG CHEWABLE TABLET] Chew 1 tablet (81 mg total) daily. Take aspirin indefinitely (for the rest of your life). 30 tablet 11    CALCIUM CITRATE-VITAMIN D3 ORAL Take 0.5 tablets by mouth daily      CHOLECALCIFEROL, VITAMIN D3, (VITAMIN D3 ORAL) [CHOLECALCIFEROL, VITAMIN D3, (VITAMIN D3 ORAL)] Take 5,000 Units by mouth daily. TABS      ferrous sulfate  325 (65 FE) MG tablet [FERROUS SULFATE 325 (65 FE) MG TABLET] Take 1 tablet by mouth daily. As directed      rosuvastatin (CRESTOR) 20 MG tablet TAKE 1 TABLET BY MOUTH AT BEDTIME 90 tablet 3       Cardiographics:    EC2020 read by me normal sinus rhythm within normal limits     ECG: Normal sinus rhythm within normal limits  CT coronary angiogram: 2020  The total Agatston calcium score is 17. A calcium score in this range places the individual in the 50th percentile when compared to an age and gender matched control group and implies a moderate risk of cardiac events in the next ten years.  There is mild to moderate disease in the proximal LAD estimated luminal stenosis of 30 to 50%.  There is a focal area of severe stenosis in the mid left anterior descending estimated at 70% or greater. Cannot fully exclude that this is artifactual but concerning for potential focal severe stenosis with soft plaque.  No significant obstructive disease in the circumflex and right coronary artery.     2021  Limited visulaization of the mid and distal left anterior descending artery stents due to metallic artifact, but stents are likely patent.  Otherwise minimal nonobstructive coronary artery disease.        Coronary angiogram: 2020  LM mild dz  LAD 90% serial stenoses in mid LAD, mild prox and distal LAD  LCx mild dz  RCA mild dz     EDP 13mmHg     Successful PCI of mid LAD with 3x16 and 2.75x12 Synergy JULIO (spot stenting rather than one long stent chosen in an attempt to avoid jailing ostium of D2)  0% residual, FLAKO 3 flow post     Lab Results    Chemistry/lipid CBC Cardiac Enzymes/BNP/TSH/INR   Recent Labs   Lab Test 23  0749   CHOL 139   HDL 49*   LDL 69   TRIG 104     Recent Labs   Lab Test 23  0749 22  0835 22  1012   LDL 69 64 71     Recent Labs   Lab Test 23  0749      POTASSIUM 4.7   CHLORIDE 107   CO2 25   *   BUN 15.3   CR 0.88   GFRESTIMATED 71   DAMIR  10.2     Recent Labs   Lab Test 09/27/23  0749 09/14/22  0835 01/07/22  1012   CR 0.88 0.84 0.78     Recent Labs   Lab Test 09/27/23  0749 09/14/22  0835   A1C 5.7* 5.8*          Recent Labs   Lab Test 09/27/23  0749   WBC 6.8   HGB 11.1*   HCT 37.1   MCV 65*        Recent Labs   Lab Test 09/27/23  0749 09/14/22  0835 07/20/20  0730   HGB 11.1* 10.9* 10.4*    Recent Labs   Lab Test 06/09/20  1308   TROPONINI <0.01     No results for input(s): BNP, NTBNPI, NTBNP in the last 71455 hours.  Recent Labs   Lab Test 09/27/23  0749   TSH 2.94     No results for input(s): INR in the last 19565 hours.                      Thank you for allowing me to participate in the care of your patient.      Sincerely,     Say Morris MD     [facility]  cc:   No referring provider defined for this encounter.

## 2023-09-29 NOTE — PROGRESS NOTES
Cardiology Progress Note     Assessment:    Coronary artery disease, status post elective stenting of LAD in July 2020  Chest pain, left-sided, nonexertional, some reproducibility with touch  Hypercholesterolemia on statin, satisfactory control    Plan:  Continue current medications  Stress test to assess atypical chest pain    Follow-up in 1 year    Subjective:   This is 68 year old female who comes in today for follow-up visit.  She denies any exertional chest pain.  She continues to get occasional left-sided anterior chest pains.  The chest wall feels sore to touch at the time.  She denies any associated symptoms of shortness of breath nausea vomiting or heart palpitations.  She has been compliant with cardiac medications.    Review of Systems:   Negative other than history of present illness    Objective:   /60 (BP Location: Right arm, Patient Position: Sitting, Cuff Size: Adult Regular)   Pulse 67   Resp 20   Wt 62.7 kg (138 lb 3.2 oz)   SpO2 98%   BMI 27.91 kg/m    Physical Exam:  GENERAL: no distress  NECK: No JVD  LUNGS: Clear to auscultation.  CARDIAC: regular rhythm, S1 & S2 normal.  No heaves, thrills, gallops or murmurs.  ABDOMEN: flat, negative hepatosplenomegaly, soft and non-tender.  EXTREMITIES: No evidence of cyanosis, clubbing or edema.    Current Outpatient Medications   Medication Sig Dispense Refill    aspirin 81 mg chewable tablet [ASPIRIN 81 MG CHEWABLE TABLET] Chew 1 tablet (81 mg total) daily. Take aspirin indefinitely (for the rest of your life). 30 tablet 11    CALCIUM CITRATE-VITAMIN D3 ORAL Take 0.5 tablets by mouth daily      CHOLECALCIFEROL, VITAMIN D3, (VITAMIN D3 ORAL) [CHOLECALCIFEROL, VITAMIN D3, (VITAMIN D3 ORAL)] Take 5,000 Units by mouth daily. TABS      ferrous sulfate 325 (65 FE) MG tablet [FERROUS SULFATE 325 (65 FE) MG TABLET] Take 1 tablet by mouth daily. As directed      rosuvastatin (CRESTOR) 20 MG tablet TAKE 1 TABLET BY MOUTH AT BEDTIME 90 tablet 3        Cardiographics:    EC2020 read by me normal sinus rhythm within normal limits     ECG: Normal sinus rhythm within normal limits  CT coronary angiogram: 2020  The total Agatston calcium score is 17. A calcium score in this range places the individual in the 50th percentile when compared to an age and gender matched control group and implies a moderate risk of cardiac events in the next ten years.  There is mild to moderate disease in the proximal LAD estimated luminal stenosis of 30 to 50%.  There is a focal area of severe stenosis in the mid left anterior descending estimated at 70% or greater. Cannot fully exclude that this is artifactual but concerning for potential focal severe stenosis with soft plaque.  No significant obstructive disease in the circumflex and right coronary artery.     2021  Limited visulaization of the mid and distal left anterior descending artery stents due to metallic artifact, but stents are likely patent.  Otherwise minimal nonobstructive coronary artery disease.        Coronary angiogram: 2020  LM mild dz  LAD 90% serial stenoses in mid LAD, mild prox and distal LAD  LCx mild dz  RCA mild dz     EDP 13mmHg     Successful PCI of mid LAD with 3x16 and 2.75x12 Synergy JULIO (spot stenting rather than one long stent chosen in an attempt to avoid jailing ostium of D2)  0% residual, FLAKO 3 flow post     Lab Results    Chemistry/lipid CBC Cardiac Enzymes/BNP/TSH/INR   Recent Labs   Lab Test 23  0749   CHOL 139   HDL 49*   LDL 69   TRIG 104     Recent Labs   Lab Test 23  0749 22  0835 22  1012   LDL 69 64 71     Recent Labs   Lab Test 23  0749      POTASSIUM 4.7   CHLORIDE 107   CO2 25   *   BUN 15.3   CR 0.88   GFRESTIMATED 71   DAMIR 10.2     Recent Labs   Lab Test 23  0749 22  0835 22  1012   CR 0.88 0.84 0.78     Recent Labs   Lab Test 23  0749 22  0835   A1C 5.7* 5.8*          Recent Labs    Lab Test 09/27/23  0749   WBC 6.8   HGB 11.1*   HCT 37.1   MCV 65*        Recent Labs   Lab Test 09/27/23  0749 09/14/22  0835 07/20/20  0730   HGB 11.1* 10.9* 10.4*    Recent Labs   Lab Test 06/09/20  1308   TROPONINI <0.01     No results for input(s): BNP, NTBNPI, NTBNP in the last 23802 hours.  Recent Labs   Lab Test 09/27/23  0749   TSH 2.94     No results for input(s): INR in the last 25506 hours.

## 2023-10-02 ENCOUNTER — HOSPITAL ENCOUNTER (OUTPATIENT)
Dept: NUCLEAR MEDICINE | Facility: CLINIC | Age: 68
Discharge: HOME | End: 2023-10-02
Attending: INTERNAL MEDICINE
Payer: COMMERCIAL

## 2023-10-02 ENCOUNTER — HOSPITAL ENCOUNTER (OUTPATIENT)
Dept: CARDIOLOGY | Facility: CLINIC | Age: 68
Discharge: HOME | End: 2023-10-02
Attending: INTERNAL MEDICINE
Payer: COMMERCIAL

## 2023-10-02 DIAGNOSIS — R07.2 PRECORDIAL PAIN: ICD-10-CM

## 2023-10-02 LAB
CV STRESS CURRENT BP HE: NORMAL
CV STRESS CURRENT HR HE: 58
CV STRESS CURRENT HR HE: 62
CV STRESS CURRENT HR HE: 67
CV STRESS CURRENT HR HE: 70
CV STRESS CURRENT HR HE: 72
CV STRESS CURRENT HR HE: 73
CV STRESS CURRENT HR HE: 78
CV STRESS CURRENT HR HE: 78
CV STRESS CURRENT HR HE: 81
CV STRESS CURRENT HR HE: 81
CV STRESS CURRENT HR HE: 86
CV STRESS CURRENT HR HE: 87
CV STRESS CURRENT HR HE: 87
CV STRESS CURRENT HR HE: 88
CV STRESS DEVIATION TIME HE: NORMAL
CV STRESS ECHO PERCENT HR HE: NORMAL
CV STRESS EXERCISE STAGE HE: NORMAL
CV STRESS FINAL RESTING BP HE: NORMAL
CV STRESS FINAL RESTING HR HE: 70
CV STRESS MAX HR HE: 88
CV STRESS MAX TREADMILL GRADE HE: 0
CV STRESS MAX TREADMILL SPEED HE: 0
CV STRESS PEAK DIA BP HE: NORMAL
CV STRESS PEAK SYS BP HE: NORMAL
CV STRESS PHASE HE: NORMAL
CV STRESS PROTOCOL HE: NORMAL
CV STRESS RESTING PT POSITION HE: NORMAL
CV STRESS ST DEVIATION AMOUNT HE: NORMAL
CV STRESS ST DEVIATION ELEVATION HE: NORMAL
CV STRESS ST EVELATION AMOUNT HE: NORMAL
CV STRESS TEST TYPE HE: NORMAL
CV STRESS TOTAL STAGE TIME MIN 1 HE: NORMAL
RATE PRESSURE PRODUCT: NORMAL
STRESS ECHO BASELINE DIASTOLIC HE: 75
STRESS ECHO BASELINE HR: 56
STRESS ECHO BASELINE SYSTOLIC BP: 197
STRESS ECHO CALCULATED PERCENT HR: 58 %
STRESS ECHO LAST STRESS DIASTOLIC BP: 68
STRESS ECHO LAST STRESS HR: 81
STRESS ECHO LAST STRESS SYSTOLIC BP: 159
STRESS ECHO TARGET HR: 151
STRESS/REST PERFUSION RATIO: 1.45

## 2023-10-02 PROCEDURE — 93016 CV STRESS TEST SUPVJ ONLY: CPT | Performed by: INTERNAL MEDICINE

## 2023-10-02 PROCEDURE — 93018 CV STRESS TEST I&R ONLY: CPT | Performed by: INTERNAL MEDICINE

## 2023-10-02 PROCEDURE — A9500 TC99M SESTAMIBI: HCPCS | Performed by: INTERNAL MEDICINE

## 2023-10-02 PROCEDURE — 343N000001 HC RX 343: Performed by: INTERNAL MEDICINE

## 2023-10-02 PROCEDURE — 250N000011 HC RX IP 250 OP 636: Performed by: INTERNAL MEDICINE

## 2023-10-02 PROCEDURE — 78452 HT MUSCLE IMAGE SPECT MULT: CPT

## 2023-10-02 PROCEDURE — 93017 CV STRESS TEST TRACING ONLY: CPT

## 2023-10-02 PROCEDURE — 78452 HT MUSCLE IMAGE SPECT MULT: CPT | Mod: 26 | Performed by: INTERNAL MEDICINE

## 2023-10-02 RX ORDER — AMINOPHYLLINE 25 MG/ML
50 INJECTION, SOLUTION INTRAVENOUS
Status: DISCONTINUED | OUTPATIENT
Start: 2023-10-02 | End: 2023-10-02 | Stop reason: HOSPADM

## 2023-10-02 RX ORDER — REGADENOSON 0.08 MG/ML
0.4 INJECTION, SOLUTION INTRAVENOUS ONCE
Status: COMPLETED | OUTPATIENT
Start: 2023-10-02 | End: 2023-10-02

## 2023-10-02 RX ORDER — CAFFEINE CITRATE 20 MG/ML
60 SOLUTION INTRAVENOUS
Status: DISCONTINUED | OUTPATIENT
Start: 2023-10-02 | End: 2023-10-02 | Stop reason: HOSPADM

## 2023-10-02 RX ORDER — ALBUTEROL SULFATE 0.83 MG/ML
2.5 SOLUTION RESPIRATORY (INHALATION)
Status: DISCONTINUED | OUTPATIENT
Start: 2023-10-02 | End: 2023-10-02 | Stop reason: HOSPADM

## 2023-10-02 RX ORDER — CAFFEINE 200 MG
200 TABLET ORAL
Status: DISCONTINUED | OUTPATIENT
Start: 2023-10-02 | End: 2023-10-02 | Stop reason: HOSPADM

## 2023-10-02 RX ADMIN — Medication 29.9 MILLICURIE: at 08:40

## 2023-10-02 RX ADMIN — Medication 8.56 MILLICURIE: at 07:15

## 2023-10-02 RX ADMIN — REGADENOSON 0.4 MG: 0.08 INJECTION, SOLUTION INTRAVENOUS at 08:21

## 2023-10-17 DIAGNOSIS — M81.0 AGE-RELATED OSTEOPOROSIS WITHOUT CURRENT PATHOLOGICAL FRACTURE: Primary | ICD-10-CM

## 2023-10-17 DIAGNOSIS — Z92.29 PERSONAL HISTORY OF OTHER DRUG THERAPY: ICD-10-CM

## 2023-11-09 ENCOUNTER — OFFICE VISIT (OUTPATIENT)
Dept: INTERNAL MEDICINE | Facility: CLINIC | Age: 68
End: 2023-11-09
Payer: COMMERCIAL

## 2023-11-09 VITALS
HEART RATE: 77 BPM | RESPIRATION RATE: 16 BRPM | WEIGHT: 140 LBS | OXYGEN SATURATION: 97 % | HEIGHT: 59 IN | SYSTOLIC BLOOD PRESSURE: 120 MMHG | DIASTOLIC BLOOD PRESSURE: 62 MMHG | BODY MASS INDEX: 28.22 KG/M2

## 2023-11-09 DIAGNOSIS — M81.0 AGE-RELATED OSTEOPOROSIS WITHOUT CURRENT PATHOLOGICAL FRACTURE: Primary | ICD-10-CM

## 2023-11-09 PROCEDURE — 96372 THER/PROPH/DIAG INJ SC/IM: CPT | Performed by: INTERNAL MEDICINE

## 2023-11-09 PROCEDURE — 99213 OFFICE O/P EST LOW 20 MIN: CPT | Mod: 25 | Performed by: INTERNAL MEDICINE
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
## 2023-11-09 NOTE — PATIENT INSTRUCTIONS
Prolia 15th today.  Prolia 16th in 6 months with my nurse. I will see you in 1 year.    DXA in 10/2024 .   Phone number to schedule [phone].    Daily calcium need is 9032-5959 mg a day from the diet and supplements.  Calcium citrate is easier to digest.  Vitamin D 2000 IU daily recommended.    Risk of rebound vertebral fractures is higher when Prolia suddenly stopped or dose was missed.      Prolia and Covid vaccine should be  for at least a week.

## 2024-05-10 ENCOUNTER — TELEPHONE (OUTPATIENT)
Dept: INTERNAL MEDICINE | Facility: CLINIC | Age: 69
End: 2024-05-10

## 2024-05-10 ENCOUNTER — ALLIED HEALTH/NURSE VISIT (OUTPATIENT)
Dept: FAMILY MEDICINE | Facility: CLINIC | Age: 69
End: 2024-05-10
Payer: COMMERCIAL

## 2024-05-10 DIAGNOSIS — M81.0 AGE-RELATED OSTEOPOROSIS WITHOUT CURRENT PATHOLOGICAL FRACTURE: Primary | ICD-10-CM

## 2024-05-10 PROCEDURE — 96372 THER/PROPH/DIAG INJ SC/IM: CPT | Performed by: INTERNAL MEDICINE

## 2024-05-10 PROCEDURE — 99207 PR NO CHARGE NURSE ONLY: CPT

## 2024-05-10 NOTE — PROGRESS NOTES
Patient had a Dexa 10/10/22.   Did Prolia 16th today. Scheduled for Prolia 17th on 11/12/24.     Patient is due for another ELEAZAR and would like to do it before the next visit.     Keo MORALES RN

## 2024-05-10 NOTE — TELEPHONE ENCOUNTER
Call to notify patient of Dexa scan ordered, no answer.     [employer] message sent to patient.     Closing encounter.     Keo MORALES RN

## 2024-05-10 NOTE — PROGRESS NOTES
Indication: Prolia  (denosumab) is a prescription medicine used to treat osteoporosis in patients who:   Are at high risk for fracture, meaning patients who have had a fracture related to osteoporosis, or who have multiple risk factors for fracture   Cannot use another osteoporosis medicine or other osteoporosis medicines did not work well   The timeline for early/late injections would be 4 weeks early and any time after the 6 month maged. If a patient receives their injection late, then the subsequent injection would be 6 months from the date that they actually received the injection    1.  When was the last injection?  11/9/23  2.  Did they check with their insurance for this calendar year?  Yes   3.  Is there an order in the chart?  Yes   4.  Has the patient had dental work involving the bone in the past month or will have work in the next 6 months?  No   5.  Did you have the patient wait 15 minutes after the injection?  Yes   6.  Remember to use .injection under the medication notes    The following steps were completed to comply with the REMS program for Prolia:  Reviewed information in the Medication Guide and Patient Counseling Chart, including the serious risks of Prolia  and the symptoms of each risk.  Advised patient to seek prompt medical attention if they have signs or symptoms of any of the serious risks.  Provided each patient a copy of the Medication Guide and Patient Brochure.     Clinic Administered Medication Documentation      Prolia Documentation    Indication: Prolia  (denosumab) is a prescription medicine used to treat osteoporosis in patients who:   Are at high risk for fracture, meaning patients who have had a fracture related to osteoporosis, or who have multiple risk factors for fracture.  Cannot use another osteoporosis medicine or other osteoporosis medicines did not work well.  The timeline for early/late injections would be 4 weeks early and any time after the 6 month maged. If a patient  receives their injection late, then the subsequent injection would be 6 months from the date that they actually received the injection.    When was the last injection?  23  Was the last injection at least 6 months ago? Yes  Has the prior authorization been completed?  Yes  Is there an active order (written within the past 365 days, with administrations remaining, not ) in the chart?  Yes  Patient denies any dental work involving the bone (e.g. tooth extraction or dental implants) in the past 4 weeks?  Yes  Patient denies plans for any dental work involving the bone (e.g. tooth extraction or dental implants) in the next 4 weeks? Yes    The following steps were completed to comply with the REMS program for Prolia:  Reviewed information in the Medication Guide and Patient Counseling Chart, including the serious risks of Prolia  and the symptoms of each risk.  Advised patient to seek prompt medical attention if they have signs or symptoms of any of the serious risks.  Provided each patient a copy of the Medication Guide and Patient Brochure.    Prior to injection, verified patient identity using patient's name and date of birth. Medication was administered. Please see MAR and medication order for additional information. Patient instructed to remain in clinic for 15 minutes and report any adverse reaction to staff immediately.    Vial/Syringe: Syringe  Was this medication supplied by the patient? No  Verified that the patient has refills remaining in their prescription.     Keo MORALES RN

## 2024-09-11 ENCOUNTER — DOCUMENTATION ONLY (OUTPATIENT)
Dept: LAB | Facility: CLINIC | Age: 69
End: 2024-09-11
Payer: COMMERCIAL

## 2024-09-11 DIAGNOSIS — E78.2 MIXED HYPERLIPIDEMIA: ICD-10-CM

## 2024-09-11 DIAGNOSIS — R73.03 PREDIABETES: Primary | ICD-10-CM

## 2024-09-11 DIAGNOSIS — Z00.00 ROUTINE GENERAL MEDICAL EXAMINATION AT A HEALTH CARE FACILITY: ICD-10-CM

## 2024-09-11 DIAGNOSIS — D56.8 OTHER THALASSEMIA (H): ICD-10-CM

## 2024-09-11 NOTE — PROGRESS NOTES
Cayla Harman has an upcoming lab appointment:    Future Appointments   Date Time Provider Department Center   9/25/2024  1:30 PM Juan Diego Eastman MD [facility]   9/26/2024  8:00 AM [facility] LAB [facility]   9/30/2024  8:00 AM [id number]   11/6/2024  2:00 PM [facility] DXA [facility]XA [facility]   11/12/2024  9:20 AM Marylin Simpson MD [facility]   11/12/2024  2:50 PM [facility]     Patient is scheduled for the following lab(s):     There is no order available. Please review and place either future orders or HMPO (Review of Health Maintenance Protocol Orders), as appropriate.    Health Maintenance Due   Topic    ANNUAL REVIEW OF HM ORDERS     STEVE Bynum

## 2024-09-12 ENCOUNTER — TELEPHONE (OUTPATIENT)
Dept: INTERNAL MEDICINE | Facility: CLINIC | Age: 69
End: 2024-09-12
Payer: COMMERCIAL

## 2024-09-12 ENCOUNTER — NURSE TRIAGE (OUTPATIENT)
Dept: INTERNAL MEDICINE | Facility: CLINIC | Age: 69
End: 2024-09-12
Payer: COMMERCIAL

## 2024-09-12 ENCOUNTER — MYC MEDICAL ADVICE (OUTPATIENT)
Dept: INTERNAL MEDICINE | Facility: CLINIC | Age: 69
End: 2024-09-12
Payer: COMMERCIAL

## 2024-09-12 DIAGNOSIS — U07.1 INFECTION DUE TO 2019 NOVEL CORONAVIRUS: Primary | ICD-10-CM

## 2024-09-12 NOTE — TELEPHONE ENCOUNTER
Symptoms started Tuesday 9/10/24. Chills, congestion, fatigue. Denies difficulty breathing and chest pain. Positive COVID test Today. Educated on red flag symptoms and when to call the clinic. Patient stated understanding. Paxlovid protocol completed and prescribed. Instructed pt to stop rosuvastatin while taking medication and to resume 1 day after completing treatment. Patient stated understanding.     Reason for Disposition   COVID-19 diagnosed by positive lab test (e.g., PCR, rapid self-test kit) and mild symptoms (e.g., cough, fever, others) and no complications or SOB    Additional Information   Negative: SEVERE difficulty breathing (e.g., struggling for each breath, speaks in single words)   Negative: Difficult to awaken or acting confused (e.g., disoriented, slurred speech)   Negative: Bluish (or gray) lips or face now   Negative: Shock suspected (e.g., cold/pale/clammy skin, too weak to stand, low BP, rapid pulse)   Negative: Sounds like a life-threatening emergency to the triager   Negative: Diagnosed or suspected COVID-19 and symptoms lasting 3 or more weeks   Negative: COVID-19 exposure and no symptoms   Negative: COVID-19 vaccine reaction suspected (e.g., fever, headache, muscle aches) occurring 1 to 3 days after getting vaccine   Negative: COVID-19 vaccine, questions about   Negative: Lives with someone known to have influenza (flu test positive) and flu-like symptoms (e.g., cough, runny nose, sore throat, SOB; with or without fever)   Negative: Possible COVID-19 symptoms and triager concerned about severity of symptoms or other causes   Negative: COVID-19 and breastfeeding, questions about   Negative: SEVERE or constant chest pain or pressure  (Exception: Mild central chest pain, present only when coughing.)   Negative: MODERATE difficulty breathing (e.g., speaks in phrases, SOB even at rest, pulse 100-120)   Negative: Headache and stiff neck (can't touch chin to chest)   Negative: Oxygen level (e.g.,  pulse oximetry) 90% or lower   Negative: Chest pain or pressure  (Exception: MILD central chest pain, present only when coughing.)   Negative: Drinking very little and dehydration suspected (e.g., no urine > 12 hours, very dry mouth, very lightheaded)   Negative: Patient sounds very sick or weak to the triager   Negative: MILD difficulty breathing (e.g., minimal/no SOB at rest, SOB with walking, pulse <100)   Negative: Fever > 103 F (39.4 C)   Negative: Fever > 101 F (38.3 C) and over 60 years of age   Negative: Fever > 100.0 F (37.8 C) and bedridden (e.g., CVA, chronic illness, recovering from surgery)   Negative: HIGH RISK patient (e.g., weak immune system, age > 64 years, obesity with BMI of 30 or higher, pregnant, chronic lung disease or other chronic medical condition) and COVID symptoms (e.g., cough, fever)  (Exceptions: Already seen by doctor or NP/PA and no new or worsening symptoms.)   Negative: HIGH RISK patient and influenza is widespread in the community and ONE OR MORE respiratory symptoms: cough, sore throat, runny or stuffy nose   Negative: HIGH RISK patient and influenza exposure within the last 7 days and ONE OR MORE respiratory symptoms: cough, sore throat, runny or stuffy nose   Negative: Oxygen level (e.g., pulse oximetry) 91 to 94%   Negative: COVID-19 infection suspected by caller or triager and mild symptoms (cough, fever, or others) and negative COVID-19 rapid test   Negative: Fever present > 3 days (72 hours)   Negative: Fever returns after gone for over 24 hours and symptoms worse or not improved   Negative: Continuous (nonstop) coughing interferes with work or school and no improvement using cough treatment per Care Advice   Negative: Cough present > 3 weeks   Negative: COVID-19 diagnosed by positive lab test (e.g., PCR, rapid self-test kit) and NO symptoms (e.g., cough, fever, others)    Protocols used: Coronavirus (COVID-19) Diagnosed or [id number]

## 2024-09-12 NOTE — TELEPHONE ENCOUNTER
General Call    Contacts       Contact Date/Time Type Contact Phone/Fax    09/12/2024 09:29 AM CDT Phone (Incoming) Cayla Harman (Self) [phone] (M)          Reason for Call:  Covid Test    What are your questions or concerns:  [employer] and Pt on the line to discuss at home covid tests.  [employer] was saying that we are responsible for paying for at home covid test.  TC advised to Pt that the clinic does not get involved in any at home tests nor do we pay for them.  TC did advise Pt that if she takes one and it's positive, she can do an E visit to see if she is a Paxlovid candidate.     Mal Ferro

## 2024-09-12 NOTE — TELEPHONE ENCOUNTER
Reason for Call: Request for an order or referral:    Order or referral being requested: paxlovid    Date needed: as soon as possible    Has the patient been seen by the PCP for this problem? Not Applicable    Additional comments: Patient tested positive for covid today with a home test. Rquesting covid treatment. Please reach out to further assist.    Phone number Patient can be reached at:  Cell number on file:    Telephone Information:   Mobile [phone]       Best Time:  anytime    Can we leave a detailed message on this number?  YES    Call taken on 9/12/2024 at 4:57 PM by Diamond Bynum

## 2024-09-12 NOTE — TELEPHONE ENCOUNTER
Cristel calling with [employer] to get [facility] information- gave her clinic address and phone number and fax.

## 2024-09-12 NOTE — TELEPHONE ENCOUNTER
RN COVID TREATMENT VISIT  09/12/24      The patient has been triaged and does not require a higher level of care.    Cayla Harman  69 year old  Current weight? 140 lbs    Has the patient been seen by a primary care provider at an [facility] or [facility] within the past two years? Yes.   Have you been in close proximity to/do you have a known exposure to a person with a confirmed case of influenza? No.     General treatment eligibility:  Date of positive COVID test (PCR or at home)?  9/12/24    Are you or have you been hospitalized for this COVID-19 infection? No.   Have you received monoclonal antibodies or antiviral treatment for COVID-19 since this positive test? No.   Do you have any of the following conditions that place you at risk of being very sick from COVID-19?   - Age 50 years or older  - Heart conditions such as cardiomyopathies, congenital heart defects, coronary artery disease, heart arrhythmias, heart failure, hypertension, valve disorders   Yes, patient has at least one high risk condition as noted above.     Current COVID symptoms:   - fever or chills  - fatigue  - congestion or runny nose  Yes. Patient has at least one symptom as selected.     How many days since symptoms started? 5 days or less. Established patient, 12 years or older weighing at least 88.2 lbs, who has symptoms that started in the past 5 days, has not been hospitalized nor received treatment already, and is at risk for being very sick from COVID-19.     Treatment eligibility by RN:  Are you currently pregnant or nursing? No  Do you have a clinically significant hypersensitivity to nirmatrelvir or ritonavir, or toxic epidermal necrolysis (TEN) or Menon-Jaleel Syndrome? No  Do you have a history of hepatitis, any hepatic impairment on the Problem List (such as Child-Sepulveda Class C, cirrhosis, fatty liver disease, alcoholic liver disease), or was the last liver lab (hepatic panel, ALT, AST, ALK Phos,  bilirubin) elevated in the past 6 months? No  Do you have any history of severe renal impairment (eGFR < 30mL/min)? No    Is patient eligible to continue? Yes, patient meets all eligibility requirements for the RN COVID treatment (as denoted by all no responses above).     Current Outpatient Medications   Medication Sig Dispense Refill    aspirin 81 mg chewable tablet [ASPIRIN 81 MG CHEWABLE TABLET] Chew 1 tablet (81 mg total) daily. Take aspirin indefinitely (for the rest of your life). 30 tablet 11    CALCIUM CITRATE-VITAMIN D3 ORAL Take 0.5 tablets by mouth daily      CHOLECALCIFEROL, VITAMIN D3, (VITAMIN D3 ORAL) [CHOLECALCIFEROL, VITAMIN D3, (VITAMIN D3 ORAL)] Take 5,000 Units by mouth daily. TABS      ferrous sulfate 325 (65 FE) MG tablet [FERROUS SULFATE 325 (65 FE) MG TABLET] Take 1 tablet by mouth daily. As directed      rosuvastatin (CRESTOR) 20 MG tablet TAKE 1 TABLET BY MOUTH AT BEDTIME 90 tablet 3       Medications from List 1 of the standing order (on medications that exclude the use of Paxlovid) that patient is taking: NONE. Is patient taking Nena's Wort? No  Is patient taking Enigma's Wort or any meds from List 1? No.   Medications from List 2 of the standing order (on meds that provider needs to adjust) that patient is taking: NONE. Is patient on any of the meds from List 2? No.   Medications from List 3 of standing order (on meds that a RN needs to adjust) that patient is taking: rosuvastatin (Crestor): Instructed patient to stop rosuvastatin while taking Paxlovid and restart rosuvastatin 1 day after the completion of Paxlovid.  Is patient on any meds from List 3? Yes. Patient is on meds from list 3. No meds require a provider visit and at least one med required RN to adjust.     Paxlovid has an approximate 90% reduction in hospitalization. Paxlovid can possibly cause altered sense of taste, diarrhea (loose, watery stools), high blood pressure, muscle aches.     Would patient like a Paxlovid  prescription?   Yes.   Lab Results   Component Value Date    GFRESTIMATED 71 09/27/2023       Was last eGFR reduced? No, eGFR 60 or greater/ No Result on record. Patient can receive the normal renal function dose. Paxlovid Rx sent to [facility]    Temporary change to home medications: rosuvastatin (Crestor): Instructed patient to stop rosuvastatin while taking Paxlovid and restart rosuvastatin 1 day after the completion of Paxlovid.     All medication adjustments (holds, etc) were discussed with the patient and patient was asked to repeat back (teachback) their med adjustment.  Did patient understand med adjustment? Yes, patient repeated back and understood correctly.        Reviewed the following instructions with the patient:    Paxlovid (nimatrelvir and ritonavir)    How it works  Two medicines (nirmatrelvir and ritonavir) are taken together. They stop the virus from growing. Less amount of virus is easier for your body to fight.    How to take  Medicine comes in a daily container with both medicine tablets. Take by mouth twice daily (once in the morning, once at night) for 5 days.  The number of tablets to take varies by patient.  Don't chew or break capsules. Swallow whole.    When to take  Take as soon as possible after positive COVID-19 test result, and within 5 days of your first symptoms.    Possible side effects  Can cause altered sense of taste, diarrhea (loose, watery stools), high blood pressure, muscle aches.    Viridiana Narvaez RN

## 2024-09-25 ENCOUNTER — ANCILLARY PROCEDURE (OUTPATIENT)
Dept: GENERAL RADIOLOGY | Facility: CLINIC | Age: 69
End: 2024-09-25
Attending: INTERNAL MEDICINE
Payer: COMMERCIAL

## 2024-09-25 ENCOUNTER — OFFICE VISIT (OUTPATIENT)
Dept: INTERNAL MEDICINE | Facility: CLINIC | Age: 69
End: 2024-09-25
Payer: COMMERCIAL

## 2024-09-25 VITALS
TEMPERATURE: 97.9 F | HEART RATE: 64 BPM | OXYGEN SATURATION: 99 % | SYSTOLIC BLOOD PRESSURE: 112 MMHG | WEIGHT: 138 LBS | DIASTOLIC BLOOD PRESSURE: 56 MMHG | RESPIRATION RATE: 16 BRPM | HEIGHT: 60 IN | BODY MASS INDEX: 27.09 KG/M2

## 2024-09-25 DIAGNOSIS — E78.2 MIXED HYPERLIPIDEMIA: ICD-10-CM

## 2024-09-25 DIAGNOSIS — Z00.00 ROUTINE GENERAL MEDICAL EXAMINATION AT A HEALTH CARE FACILITY: Primary | ICD-10-CM

## 2024-09-25 DIAGNOSIS — M54.2 NECK PAIN, MUSCULOSKELETAL: ICD-10-CM

## 2024-09-25 DIAGNOSIS — M47.816 SPONDYLOSIS OF LUMBAR REGION WITHOUT MYELOPATHY OR RADICULOPATHY: ICD-10-CM

## 2024-09-25 DIAGNOSIS — M81.0 AGE-RELATED OSTEOPOROSIS WITHOUT CURRENT PATHOLOGICAL FRACTURE: ICD-10-CM

## 2024-09-25 DIAGNOSIS — D56.8 OTHER THALASSEMIA (H): ICD-10-CM

## 2024-09-25 PROCEDURE — 90662 IIV NO PRSV INCREASED AG IM: CPT | Performed by: INTERNAL MEDICINE

## 2024-09-25 PROCEDURE — 99214 OFFICE O/P EST MOD 30 MIN: CPT | Mod: 25 | Performed by: INTERNAL MEDICINE

## 2024-09-25 PROCEDURE — 72040 X-RAY EXAM NECK SPINE 2-3 VW: CPT | Mod: TC | Performed by: RADIOLOGY

## 2024-09-25 PROCEDURE — G0008 ADMIN INFLUENZA VIRUS VAC: HCPCS | Performed by: INTERNAL MEDICINE

## 2024-09-25 PROCEDURE — 72100 X-RAY EXAM L-S SPINE 2/3 VWS: CPT | Mod: TC | Performed by: RADIOLOGY

## 2024-09-25 PROCEDURE — G0439 PPPS, SUBSEQ VISIT: HCPCS | Performed by: INTERNAL MEDICINE

## 2024-09-25 NOTE — PROGRESS NOTES
Preventive Care Visit  [facility]  HÉCTOR TAN MD, Internal Medicine  Sep 25, 2024    Barbara Kulkarni is a 69 year old, presenting for the following:  Physical        9/25/2024     1:10 PM   Additional Questions   Roomed by Jordyn     Health Care Directive  Patient does not have a Health Care Directive or Living Will: Advance Directive received and scanned. Click on Code in the patient header to view.    HPI        9/25/2024   General Health   How would you rate your overall physical health? Good   Feel stress (tense, anxious, or unable to sleep) Not at all          9/25/2024   Nutrition   Diet: Vegetarian/vegan            9/25/2024   Exercise   Days per week of moderate/strenous exercise 3 days          9/25/2024   Social Factors   Frequency of gathering with friends or relatives Once a week   Worry food won't last until get money to buy more Patient declined   Food not last or not have enough money for food? Patient declined   Do you have housing? (Housing is defined as stable permanent housing and does not include staying ouside in a car, in a tent, in an abandoned building, in an overnight shelter, or couch-surfing.) Patient declined   Are you worried about losing your housing? Patient declined   Lack of transportation? Patient declined   Unable to get utilities (heat,electricity)? Patient declined          9/25/2024   Fall Risk   Fallen 2 or more times in the past year? No    No   Trouble with walking or balance? No    No       Multiple values from one day are sorted in reverse-chronological order          9/25/2024   Activities of Daily Living- Home Safety   Needs help with the following daily activites None of the above   Safety concerns in the home None of the above          9/25/2024   Dental   Dentist two times every year? Yes          9/25/2024   Hearing Screening   Hearing concerns? None of the above          9/25/2024   Driving Risk Screening   Patient/family members have  concerns about driving No          9/25/2024   General Alertness/Fatigue Screening   Have you been more tired than usual lately? No            9/25/2024   Urinary Incontinence Screening   Bothered by leaking urine in past 6 months No            9/25/2024   TB Screening   Were you born outside of the [place]? Decline            Today's PHQ-2 Score:       9/25/2024     1:10 PM   PHQ-2 ( 1999 Pfizer)   Q1: Little interest or pleasure in doing things 0   Q2: Feeling down, depressed or hopeless 0   PHQ-2 Score 0   Q1: Little interest or pleasure in doing things Not at all   Q2: Feeling down, depressed or hopeless Not at all   PHQ-2 Score 0         9/25/2024   Substance Use   Alcohol more than 3/day or more than 7/wk No   Do you have a current opioid prescription? No   How severe/bad is pain from 1 to 10? 0/10 (No Pain)   Do you use any other substances recreationally? No      Social History     Tobacco Use    Smoking status: Never     Passive exposure: Never    Smokeless tobacco: Never   Vaping Use    Vaping status: Never Used   Substance Use Topics    Alcohol use: Yes     Comment: Alcoholic Drinks/day: occasional    Drug use: No         9/27/2023   LAST FHS-7 RESULTS   1st degree relative breast or ovarian cancer No   Any relative bilateral breast cancer No   Any male have breast cancer No   Any ONE woman have BOTH breast AND ovarian cancer No   Any woman with breast cancer before 50yrs No   2 or more relatives with breast AND/OR ovarian cancer No   2 or more relatives with breast AND/OR bowel cancer No            Latest Ref Rng & Units 3/22/2019     9:53 AM 7/26/2016    10:59 AM 12/1/2014     5:11 PM   PAP / HPV   PAP Negative for squamous intraepithelial lesion or malignancy. Atypical squamous cells of undetermined significance  Electronically signed by Zoe Malone MD on 4/2/2019 at  5:01 PM    Negative for squamous intraepithelial lesion or malignancy  Electronically signed by Kiley Amaral CT (ASCP) on 8/2/2016  at 12:05 PM    Atypical squamous cells of undetermined significance  Electronically signed by Real Bernstein MD on 12/10/2014 at 11:18 AM      HPV 16 DNA NEG Negative      HPV 18 DNA NEG Negative      Other HR HPV NEG Negative        ASCVD Risk   The 10-year ASCVD risk score (Gregory FLANAGAN, et al., 2019) is: 6.8%    Values used to calculate the score:      Age: 69 years      Sex: Female      Is Non- : No      Diabetic: No      Tobacco smoker: No      Systolic Blood Pressure: 112 mmHg      Is BP treated: No      HDL Cholesterol: 49 mg/dL      Total Cholesterol: 139 mg/dL      Current providers sharing in care for this patient include:  Patient Care Team:  Juan Diego Eastman MD as PCP - General (Internal Medicine)  Mirela Vyas MD as MD (Otolaryngology)  Juan Diego Eastman MD as Assigned PCP  Davey Morris MD as Assigned Heart and Vascular Provider    The following health maintenance items are reviewed in Epic and correct as of today:  Health Maintenance   Topic Date Due    ANNUAL REVIEW OF HM ORDERS  Never done    MEDICARE ANNUAL WELLNESS VISIT  09/20/2024    LIPID  09/27/2024    COLORECTAL CANCER SCREENING  04/11/2025    FALL RISK ASSESSMENT  09/25/2025    MAMMO SCREENING  09/27/2025    GLUCOSE  09/27/2026    DTAP/TDAP/TD IMMUNIZATION (3 - Td or Tdap) 09/20/2028    ADVANCE CARE PLANNING  09/25/2029    DEXA  10/10/2037    HEPATITIS C SCREENING  Completed    PHQ-2 (once per calendar year)  Completed    INFLUENZA VACCINE  Completed    Pneumococcal Vaccine: 65+ Years  Completed    ZOSTER IMMUNIZATION  Completed    RSV VACCINE  Completed    COVID-19 Vaccine  Completed    HPV IMMUNIZATION  Aged Out    MENINGITIS IMMUNIZATION  Aged Out    RSV MONOCLONAL ANTIBODY  Aged Out       Review of Systems  Constitutional, HEENT, cardiovascular, pulmonary, gi and gu systems are negative, except as otherwise noted.     Objective    Exam  /56 (BP Location: Right arm, Patient  "Position: Sitting, Cuff Size: Adult Regular)   Pulse 64   Temp 97.9  F (36.6  C)   Resp 16   Ht 1.511 m (4' 11.5\")   Wt 62.6 kg (138 lb)   LMP  (LMP Unknown)   SpO2 99%   BMI 27.41 kg/m     Estimated body mass index is 27.41 kg/m  as calculated from the following:    Height as of this encounter: 1.511 m (4' 11.5\").    Weight as of this encounter: 62.6 kg (138 lb).      Physical Exam  General: Alert, in no distress  Skin: No significant lesion seen.  Eyes/nose/throat: Eyes without scleral icterus, eye movements normal, pupils equal and reactive, oropharynx clear, ears with normal TM's  MSK: Neck with good ROM  Lymphatic: Neck without adenopathy or masses  Endocrine: Thyroid with no nodules to palpation  Pulm: Lungs clear to auscultation bilaterally  Cardiac: Heart with regular rate and rhythm, no murmur or gallop  GI: Abdomen soft, nontender. No palpable enlargement of liver or spleen  MSK: Extremities no tenderness or edema  Neuro: Moves all extremities, without focal weakness  Psych: Alert, normal mental status. Normal affect and speech        9/25/2024   Mini Cog   Clock Draw Score 2 Normal   3 Item Recall 3 objects recalled   Mini Cog Total Score 5            ASSESSMENT / PLAN:      Annual preventive exam, overall doing really well.  Taking yoga classes.  Working on core muscle strengthening.  Follows up with Dr. Morris in cardiology because of coronary disease, and also our own Dr. Simpson for osteoporosis on Prolia therapy.     Ms. Harman will come in for fasting blood test on a future morning, at which time we will check comprehensive metabolic panel, blood cell counts, vitamin B12 level, TSH for thyroid, lipid panel, A1c to monitor prediabetes.      Colon screening is being done with Cologuard testing, would be due in 2025.  She already got her flu shot.     I am going to keep her medicines the same, rosuvastatin 20 mg a day.     She is a retired [employer] .    Mechanical low back pain and neck " pain  She reports pain in the midline of the lumbar area, without signs of radiculopathy.  I am pretty sure this is mechanical low back pain, probably generated from facet joints.  Lets get lumbar spine x-ray to see what degree of degenerative arthritic changes she may already have.  Will have her see physical therapy to work on core muscle strengthening, and flexibility.    She has similar mechanical neck pain, and is slightly tender at the insertion of the extensor muscles on the base of the skull.  Again, no radiculopathy symptoms.  Similarly we will get C-spine x-ray, and have physical therapy work on neck strengthening.  I demonstrated for her the chin tuck exercise.  I asked her to be particularly cautious with range of motion neck exercises, avoiding the extremes of flexion or extension.    Protein powder-- recommend erythritol-free    Coronary artery disease, status post elective stenting of LAD in July 2020, no symptoms of angina, heart failure, or arrhythmia, on aggressive risk factor modification with high intensity rosuvastatin and baby aspirin  Took clopidogrel for 1 year after her angioplasty     S/p PCI of mid LAD with 3x16 and 2.75x12 Synergy JULIO (spot stenting rather than one long stent chosen in an attempt to avoid jailing ostium of D2)  0% residual, FLAKO 3 flow post  January 2021    Limited visulaization of the mid and distal left anterior descending artery stents due to metallic artifact, but stents are likely patent.    Otherwise minimal nonobstructive coronary artery disease.     She maintains a vegetarian diet, and tries to stay physically active     Hyperlipidemia in the context of coronary disease, on high intensity rosuvastatin, target LDL less than 70  rosuvastatin (CRESTOR) 20 MG tablet    9-  LDL Cholesterol Calculated  <=100 mg/dL 69     Direct Measure HDL  >=50 mg/dL 49 Low      Triglycerides  <150 mg/dL 104     Microcytic anemia, she has some form of thalassemia.  She recalls  that she has had a hemoglobin electrophoresis done many years ago.  She does take an iron tablet daily.    9-  Hemoglobin  11.7 - 15.7 g/dL 11.1 Low      MCV  78 - 100 fL 65 Low      9-  Hemoglobin 11.7 - 15.7 g/dL 10.9 Low       MCV 78 - 100 fL 63 Low       Ferritin 11 - 328 ng/mL 137       Iron 37 - 145 ug/dL 73       Iron Sat Index 15 - 46 % 27       Vasomotor versus allergic rhinitis  I told her she could try the over-the-counter nasal steroid fluticasone (brand-name Flonase) 1 or 2 sprays per nostril per day, which can be used throughout allergy season, or even long-term for vasomotor rhinitis.     She has been doing saline nasal irrigation, which is fine, she could certainly continue that, and then use the nasal steroid spray after the irrigation.     Osteoporosis  As of 2023, Ms. Harman has resumed Prolia, I and works with Dr. Simpson for the monitoring and surveillance  Prolia 16th 5-  Scheduled for Prolia 17th on 11/12/24.     She has had 1 infusion of Reclast, and may have had a week of side effects either from Reclast or a COVID booster that she got at about the same time.  Reported symptoms - dry cough, runny nose, muscle and bone pain, body aches after Reclast.     10-  EXAM: DX HIP/PELVIS/SPINE  LOCATION: [facility]  DATE/TIME: 10/10/2022 9:51 AM  COMPARISON: 6/11/2019  Lumbar Spine: L1-L4: BMD: 0.876 g/cm2. T-score: -2.5. Z-score: -0.9  RIGHT Hip Total: BMD: 0.828 g/cm2. T-score: -1.4. Z-score: 0.0  RIGHT Hip Femoral neck: BMD: 0.706 g/cm2. T-score: -2.4. Z-score: -0.8  LEFT Hip Total: BMD: 0.914 g/cm2. T-score: -0.7. Z-score: 0.6  LEFT Hip Femoral neck: BMD: 0.790 g/cm2. T-score: -1.8. Z-score: -0.2  COMPARISON: There has been a 7.5 % decrease in lumbar spine BMD. There has been a 5.6 % decrease in bilateral hip BMD.                   IMPRESSION: OSTEOPOROSIS. T score meets the World Health Organization (WHO) criteria for osteoporosis at one or  more measured sites. The risk of osteoporotic fracture increased approximately two-fold for each SD decrease in T-score.      Postmenopause  BILATERAL FULL FIELD DIGITAL SCREENING MAMMOGRAM WITH TOMOSYNTHESIS  Performed on: 9/27/23     Constipation, she uses Benefiber which is fine.  If she needs something a bit stronger, she could use the osmotic laxative MiraLAX powder, which is safe to use even on a daily basis.     Primary osteoarthritis right knee  She has been working with [facility], has seen a physical therapist who gave her set of exercises that she has been doing on and off.  I suggested isometric exercise to strengthen the quadriceps muscle, without stressing the joint     I told her she could try topical diclofenac, which is known as Voltaren gel and is available over-the-counter.  Applied 3 or 4 times a day.     Mild stress incontinence and urgency     Colon screening, next Cologuard test would be 2025  Cologuard 4-  COLOGUARD-ABSTRACT Negative       Tested positive for COVID-19 May 2022, after having had 4 injections of Moderna      Immunization History   Administered Date(s) Administered    COVID-19 12+ (MODERNA) 01/03/2024, 09/05/2024    COVID-19 Bivalent 18+ (Moderna) 11/03/2022    COVID-19 Monovalent 18+ (Moderna) 02/16/2021, 03/16/2021, 10/23/2021    COVID-19 Monovalent Booster 18+ (Moderna) 04/06/2022    DT (PEDS <7y) 01/01/2000    Flu 65+ (Fluad) 09/04/2019    Flu, Unspecified 12/01/2008, 09/01/2012, 10/01/2015, 09/01/2016, 10/01/2017, 09/25/2018, 09/20/2020    HepA, Unspecified 01/01/2000    HepB, Unspecified 01/01/2000    Influenza (H1N1) 11/27/2009    Influenza (High Dose) Trivalent,PF (Fluzone) 09/25/2024    Influenza (IIV3) PF 12/01/2008, 09/01/2012    Influenza Vaccine 65+ (FLUAD) 08/31/2020, 08/28/2021, 08/31/2023    Influenza Vaccine 65+ (Fluzone HD) 09/09/2022    MMR 01/16/2013    Pneumo Conj 13-V (2010&after) 03/22/2019    Pneumococcal 23 valent 11/06/2020    Poliovirus,  inactivated (IPV) 01/16/2013    RSV Vaccine (Abrysvo) 12/18/2023    TDAP (Adacel,Boostrix) 11/05/2008, 09/20/2018    Td,adult,historic,unspecified 11/05/2008    Typhoid IM 10/05/2018    Typhoid Oral 01/16/2013, 09/20/2018    Yellow Fever 09/20/2018    Zoster recombinant adjuvanted (SHINGRIX) 04/05/2018, 07/27/2018    Zoster vaccine, live 11/30/2015       Signed Electronically by: HÉCTOR TAN MD

## 2024-09-25 NOTE — PATIENT INSTRUCTIONS
Annual preventive exam, overall doing really well.  Taking yoga classes.  Working on core muscle strengthening.  Follows up with Dr. Morris in cardiology because of coronary disease, and also our own Dr. Simpson for osteoporosis on Prolia therapy.     Ms. Harman will come in for fasting blood test on a future morning, at which time we will check comprehensive metabolic panel, blood cell counts, vitamin B12 level, TSH for thyroid, lipid panel, A1c to monitor prediabetes.      Colon screening is being done with Cologuard testing, would be due in 2025.  She already got her flu shot.     I am going to keep her medicines the same, rosuvastatin 20 mg a day.     She is a retired [employer] .    Mechanical low back pain and neck pain  She reports pain in the midline of the lumbar area, without signs of radiculopathy.  I am pretty sure this is mechanical low back pain, probably generated from facet joints.  Lets get lumbar spine x-ray to see what degree of degenerative arthritic changes she may already have.  Will have her see physical therapy to work on core muscle strengthening, and flexibility.    She has similar mechanical neck pain, and is slightly tender at the insertion of the extensor muscles on the base of the skull.  Again, no radiculopathy symptoms.  Similarly we will get C-spine x-ray, and have physical therapy work on neck strengthening.  I demonstrated for her the chin tuck exercise.  I asked her to be particularly cautious with range of motion neck exercises, avoiding the extremes of flexion or extension.    Protein powder-- recommend erythritol-free    Coronary artery disease, status post elective stenting of LAD in July 2020, no symptoms of angina, heart failure, or arrhythmia, on aggressive risk factor modification with high intensity rosuvastatin and baby aspirin  Took clopidogrel for 1 year after her angioplasty     S/p PCI of mid LAD with 3x16 and 2.75x12 Synergy JULIO (spot stenting rather than one long  stent chosen in an attempt to avoid jailing ostium of D2)  0% residual, FLAKO 3 flow post  January 2021    Limited visulaization of the mid and distal left anterior descending artery stents due to metallic artifact, but stents are likely patent.    Otherwise minimal nonobstructive coronary artery disease.     She maintains a vegetarian diet, and tries to stay physically active     Hyperlipidemia in the context of coronary disease, on high intensity rosuvastatin, target LDL less than 70  rosuvastatin (CRESTOR) 20 MG tablet    9-  LDL Cholesterol Calculated  <=100 mg/dL 69     Direct Measure HDL  >=50 mg/dL 49 Low      Triglycerides  <150 mg/dL 104     Microcytic anemia, she has some form of thalassemia.  She recalls that she has had a hemoglobin electrophoresis done many years ago.  She does take an iron tablet daily.    9-  Hemoglobin  11.7 - 15.7 g/dL 11.1 Low      MCV  78 - 100 fL 65 Low      9-  Hemoglobin 11.7 - 15.7 g/dL 10.9 Low       MCV 78 - 100 fL 63 Low       Ferritin 11 - 328 ng/mL 137       Iron 37 - 145 ug/dL 73       Iron Sat Index 15 - 46 % 27       Vasomotor versus allergic rhinitis  I told her she could try the over-the-counter nasal steroid fluticasone (brand-name Flonase) 1 or 2 sprays per nostril per day, which can be used throughout allergy season, or even long-term for vasomotor rhinitis.     She has been doing saline nasal irrigation, which is fine, she could certainly continue that, and then use the nasal steroid spray after the irrigation.     Osteoporosis  As of 2023, Ms. Harman has resumed Prolia, I and works with Dr. Simpson for the monitoring and surveillance  Prolia 16th 5-  Scheduled for Prolia 17th on 11/12/24.     She has had 1 infusion of Reclast, and may have had a week of side effects either from Reclast or a COVID booster that she got at about the same time.  Reported symptoms - dry cough, runny nose, muscle and bone pain, body aches after Reclast.      10-  EXAM: DX HIP/PELVIS/SPINE  LOCATION: [facility]  DATE/TIME: 10/10/2022 9:51 AM  COMPARISON: 6/11/2019  Lumbar Spine: L1-L4: BMD: 0.876 g/cm2. T-score: -2.5. Z-score: -0.9  RIGHT Hip Total: BMD: 0.828 g/cm2. T-score: -1.4. Z-score: 0.0  RIGHT Hip Femoral neck: BMD: 0.706 g/cm2. T-score: -2.4. Z-score: -0.8  LEFT Hip Total: BMD: 0.914 g/cm2. T-score: -0.7. Z-score: 0.6  LEFT Hip Femoral neck: BMD: 0.790 g/cm2. T-score: -1.8. Z-score: -0.2  COMPARISON: There has been a 7.5 % decrease in lumbar spine BMD. There has been a 5.6 % decrease in bilateral hip BMD.                   IMPRESSION: OSTEOPOROSIS. T score meets the World Health Organization (WHO) criteria for osteoporosis at one or more measured sites. The risk of osteoporotic fracture increased approximately two-fold for each SD decrease in T-score.      Postmenopause  BILATERAL FULL FIELD DIGITAL SCREENING MAMMOGRAM WITH TOMOSYNTHESIS  Performed on: 9/27/23     Constipation, she uses Benefiber which is fine.  If she needs something a bit stronger, she could use the osmotic laxative MiraLAX powder, which is safe to use even on a daily basis.     Primary osteoarthritis right knee  She has been working with [facility], has seen a physical therapist who gave her set of exercises that she has been doing on and off.  I suggested isometric exercise to strengthen the quadriceps muscle, without stressing the joint     I told her she could try topical diclofenac, which is known as Voltaren gel and is available over-the-counter.  Applied 3 or 4 times a day.     Mild stress incontinence and urgency     Colon screening, next Cologuard test would be 2025  Cologuard 4-  COLOGUARD-ABSTRACT Negative       Tested positive for COVID-19 May 2022, after having had 4 injections of Moderna      Immunization History   Administered Date(s) Administered    COVID-19 12+ (MODERNA) 01/03/2024, 09/05/2024    COVID-19 Bivalent 18+ (Moderna)  11/03/2022    COVID-19 Monovalent 18+ (Moderna) 02/16/2021, 03/16/2021, 10/23/2021    COVID-19 Monovalent Booster 18+ (Moderna) 04/06/2022    DT (PEDS <7y) 01/01/2000    Flu 65+ (Fluad) 09/04/2019    Flu, Unspecified 12/01/2008, 09/01/2012, 10/01/2015, 09/01/2016, 10/01/2017, 09/25/2018, 09/20/2020    HepA, Unspecified 01/01/2000    HepB, Unspecified 01/01/2000    Influenza (H1N1) 11/27/2009    Influenza (High Dose) Trivalent,PF (Fluzone) 09/25/2024    Influenza (IIV3) PF 12/01/2008, 09/01/2012    Influenza Vaccine 65+ (FLUAD) 08/31/2020, 08/28/2021, 08/31/2023    Influenza Vaccine 65+ (Fluzone HD) 09/09/2022    MMR 01/16/2013    Pneumo Conj 13-V (2010&after) 03/22/2019    Pneumococcal 23 valent 11/06/2020    Poliovirus, inactivated (IPV) 01/16/2013    RSV Vaccine (Abrysvo) 12/18/2023    TDAP (Adacel,Boostrix) 11/05/2008, 09/20/2018    Td,adult,historic,unspecified 11/05/2008    Typhoid IM 10/05/2018    Typhoid Oral 01/16/2013, 09/20/2018    Yellow Fever 09/20/2018    Zoster recombinant adjuvanted (SHINGRIX) 04/05/2018, 07/27/2018    Zoster vaccine, live 11/30/2015

## 2024-09-26 ENCOUNTER — LAB (OUTPATIENT)
Dept: LAB | Facility: CLINIC | Age: 69
End: 2024-09-26
Payer: COMMERCIAL

## 2024-09-26 DIAGNOSIS — E78.2 MIXED HYPERLIPIDEMIA: ICD-10-CM

## 2024-09-26 DIAGNOSIS — R73.03 PREDIABETES: ICD-10-CM

## 2024-09-26 DIAGNOSIS — Z00.00 ROUTINE GENERAL MEDICAL EXAMINATION AT A HEALTH CARE FACILITY: ICD-10-CM

## 2024-09-26 LAB
ALBUMIN SERPL BCG-MCNC: 4.1 G/DL (ref 3.5–5.2)
ALP SERPL-CCNC: 63 U/L (ref 40–150)
ALT SERPL W P-5'-P-CCNC: 25 U/L (ref 0–50)
ANION GAP SERPL CALCULATED.3IONS-SCNC: 8 MMOL/L (ref 7–15)
AST SERPL W P-5'-P-CCNC: 20 U/L (ref 0–45)
BILIRUB SERPL-MCNC: 0.7 MG/DL
BUN SERPL-MCNC: 17.3 MG/DL (ref 8–23)
CALCIUM SERPL-MCNC: 10.1 MG/DL (ref 8.8–10.4)
CHLORIDE SERPL-SCNC: 107 MMOL/L (ref 98–107)
CHOLEST SERPL-MCNC: 163 MG/DL
CREAT SERPL-MCNC: 0.85 MG/DL (ref 0.51–0.95)
EGFRCR SERPLBLD CKD-EPI 2021: 73 ML/MIN/1.73M2
ERYTHROCYTE [DISTWIDTH] IN BLOOD BY AUTOMATED COUNT: 16.9 % (ref 10–15)
EST. AVERAGE GLUCOSE BLD GHB EST-MCNC: 131 MG/DL
FASTING STATUS PATIENT QL REPORTED: YES
FASTING STATUS PATIENT QL REPORTED: YES
GLUCOSE SERPL-MCNC: 100 MG/DL (ref 70–99)
HBA1C MFR BLD: 6.2 % (ref 0–5.6)
HCO3 SERPL-SCNC: 26 MMOL/L (ref 22–29)
HCT VFR BLD AUTO: 36.6 % (ref 35–47)
HDLC SERPL-MCNC: 43 MG/DL
HGB BLD-MCNC: 11.2 G/DL (ref 11.7–15.7)
LDLC SERPL CALC-MCNC: 91 MG/DL
MCH RBC QN AUTO: 19.3 PG (ref 26.5–33)
MCHC RBC AUTO-ENTMCNC: 30.6 G/DL (ref 31.5–36.5)
MCV RBC AUTO: 63 FL (ref 78–100)
NONHDLC SERPL-MCNC: 120 MG/DL
PLATELET # BLD AUTO: 220 10E3/UL (ref 150–450)
POTASSIUM SERPL-SCNC: 4.4 MMOL/L (ref 3.4–5.3)
PROT SERPL-MCNC: 7.2 G/DL (ref 6.4–8.3)
RBC # BLD AUTO: 5.8 10E6/UL (ref 3.8–5.2)
SODIUM SERPL-SCNC: 141 MMOL/L (ref 135–145)
T4 FREE SERPL-MCNC: 1.16 NG/DL (ref 0.9–1.7)
TRIGL SERPL-MCNC: 145 MG/DL
TSH SERPL DL<=0.005 MIU/L-ACNC: 4.23 UIU/ML (ref 0.3–4.2)
WBC # BLD AUTO: 6.5 10E3/UL (ref 4–11)

## 2024-09-26 PROCEDURE — 84439 ASSAY OF FREE THYROXINE: CPT

## 2024-09-26 PROCEDURE — 36415 COLL VENOUS BLD VENIPUNCTURE: CPT

## 2024-09-26 PROCEDURE — 80061 LIPID PANEL: CPT

## 2024-09-26 PROCEDURE — 84443 ASSAY THYROID STIM HORMONE: CPT

## 2024-09-26 PROCEDURE — 80053 COMPREHEN METABOLIC PANEL: CPT

## 2024-09-26 PROCEDURE — 85027 COMPLETE CBC AUTOMATED: CPT

## 2024-09-26 PROCEDURE — 83036 HEMOGLOBIN GLYCOSYLATED A1C: CPT

## 2024-09-30 ENCOUNTER — HOSPITAL ENCOUNTER (OUTPATIENT)
Dept: MAMMOGRAPHY | Facility: CLINIC | Age: 69
Discharge: HOME | End: 2024-09-30
Admitting: INTERNAL MEDICINE
Payer: COMMERCIAL

## 2024-09-30 DIAGNOSIS — Z12.31 VISIT FOR SCREENING MAMMOGRAM: ICD-10-CM

## 2024-09-30 PROCEDURE — 77063 BREAST TOMOSYNTHESIS BI: CPT

## 2024-10-01 ENCOUNTER — MYC MEDICAL ADVICE (OUTPATIENT)
Dept: INTERNAL MEDICINE | Facility: CLINIC | Age: 69
End: 2024-10-01
Payer: COMMERCIAL

## 2024-10-01 NOTE — TELEPHONE ENCOUNTER
Please send this message to our Pharmacy group. I am not sure if Jubbonti is available as a treatment option in [place].

## 2024-10-01 NOTE — TELEPHONE ENCOUNTER
Please advise.     Debora GRANT RN      LOV 11/9/23 for osteo visit    (M81.0) Age-related osteoporosis without current pathological fracture  (primary encounter diagnosis)  Patient was diagnosed with premature menopause at age 60 and was started on Prolia by dr Matos in 2015.   Last year we switched from Prolia to Reclast. She has Reclast infusion in Oct 2021  She had a lot of muscle aches and a cough, I believe she was seen at [facility] for a visit they did a CK enzyme and that was/low. She then got physical therapy because her muscles were so tight from all this muscle aches from what ever was going on. She had a Covid booster few days prior Reclast, so we are not sure if the described symptoms were related to Reclast only.

## 2024-10-02 ENCOUNTER — THERAPY VISIT (OUTPATIENT)
Dept: PHYSICAL THERAPY | Facility: REHABILITATION | Age: 69
End: 2024-10-02
Attending: INTERNAL MEDICINE
Payer: COMMERCIAL

## 2024-10-02 DIAGNOSIS — M47.816 SPONDYLOSIS OF LUMBAR REGION WITHOUT MYELOPATHY OR RADICULOPATHY: ICD-10-CM

## 2024-10-02 DIAGNOSIS — M54.2 NECK PAIN, MUSCULOSKELETAL: ICD-10-CM

## 2024-10-02 PROCEDURE — 97161 PT EVAL LOW COMPLEX 20 MIN: CPT | Mod: GP | Performed by: PHYSICAL THERAPIST

## 2024-10-02 PROCEDURE — 97110 THERAPEUTIC EXERCISES: CPT | Mod: GP | Performed by: PHYSICAL THERAPIST

## 2024-10-02 NOTE — PROGRESS NOTES
PHYSICAL THERAPY EVALUATION  Type of Visit: Evaluation        Fall Risk Screen:  Fall screen completed by: PT  Have you fallen 2 or more times in the past year?: No  Have you fallen and had an injury in the past year?: No  Is patient a fall risk?: No    Subjective       Presenting condition or subjective complaint: Pt has neck and back pain.  She has a history of back pain.  She got an massage chair 13 years ago and has helped manage her pain. Since Covid she has started doing yoga a couple days a week and a couple days of [employer] class.  This seems to help as she doesn't feel she needs the massage chair. More recently she has felt on Sundays she needs to use the chair due to a slight soreness. The pain is intermittent and working in yard, sitting on couch prolonged times, and a lot of activity (standing too long to cook) may increase back pain. changing positions, exercise and massage chair may decrease symptoms.  Pain is center of low back bilateral. Pain is described as dull and achy. Pt has had neck pain which started more recently, maybe >6 months.  Pain feels like it is twisting something inside described as sharp, and is bilateral with no UE involvement. Pain is intermittent. Reaching up and styleing hair is when symptoms occur the most. She has been doing neck motion for stretching which has helped and has not had many symptoms for a couple weeks.  She does have some OA pain in her R knee at times.   Date of onset: 09/25/24 (date of referral)    Relevant medical history: Osteoporosis (CAD)   Dates & types of surgery:      Prior diagnostic imaging/testing results: X-ray     Prior therapy history for the same diagnosis, illness or injury:        Prior Level of Function  Transfers:   Ambulation:   ADL:   IADL:     Living Environment  Social support: With a significant other or spouse   Type of home: House   Stairs to enter the home: Yes 3 Is there a railing: No     Ramp:     Stairs inside the home: Yes 21 Is there  a railing: Yes     Help at home: None  Equipment owned:       Employment: No    Hobbies/Interests: yoga    Patient goals for therapy:      Pain assessment:      Objective       LUMBAR AND CERVICAL SPINE EVALUATION  PAIN:   INTEGUMENTARY (edema, incisions):   POSTURE:   GAIT:   Weightbearing Status:   Assistive Device(s):   Gait Deviations:   BALANCE/PROPRIOCEPTION:   WEIGHTBEARING ALIGNMENT:   NON-WEIGHTBEARING ALIGNMENT:    ROM:   (Degrees) Left AROM Left PROM  Right AROM Right PROM   Hip Flexion       Hip Extension       Hip Abduction       Hip Adduction       Hip Internal Rotation       Hip External Rotation       Knee Flexion       Knee Extension       Lumbar Side Bending     Lumbar Rotation WFL-stretch WFL-stretch   Lumbar Flexion WFL   Lumbar Extension WFL   Pain:   End feel:     Cervical AROM: WFL throughout.  B UE AROM WNL throughout, no pain today, B Hip AROM WFL throughout       STRENGTH (MYOTOMES): B LE and B UE strength normal throughout.  Slight pain on right when resisting SB left      */5 Left Right   Hip Flexion (L2)     Hip Extension (L3)     Hip Abduction     Hip Adduction      Hip Internal Rotation     Hip External Rotation     Knee Flexion     Knee Extension     Dorsiflexion (L4)     Great Toe Extension (L5)     Plantarflexion (S1)           DTR'S:   CORD SIGNS:   DERMATOMES:   FLEXIBILITY: WFL      LUMBAR/HIP Special Tests:   Lumbar Special Tests Left Right   Quadrant test     Straight leg raise neg neg   Crossover response     Slump neg neg   Sit-up test    Trunk extensor endurance test    Prone instability test    Natalio's    Repeated flexion    Repeated extension    Other:    SI Tests Left Right   SI Compression neg neg   SI Distraction     POSH (Thigh Thrust)     Sacral Thrust     FADIR     NATI Griffith's Test     Resisted Abduction     Pubic shotgun     Other:     Cervical Special Tests: compression and distraction negative     PALPATION: pain over R levator scapula, scalene and upper  trapezius, B SO region, cervical extensors.  B lumbar paraspinals, QL, glutes    SPINAL SEGMENTAL CONCLUSIONS: WFL     Assessment & Plan   CLINICAL IMPRESSIONS  Medical Diagnosis: Cervical and Lumbar Pain    Treatment Diagnosis: Cervical and Lumbar Pain   Impression/Assessment: Patient is a 69 year old female with minor cervical and lumbar soress complaints.  The following significant findings have been identified: Pain, Impaired muscle performance, and Decreased activity tolerance. These impairments interfere with their ability to perform  prolonged positions like standing or sitting, sitting on the couch, reaching up to style hair  as compared to previous level of function.     Clinical Decision Making (Complexity):  Clinical Presentation: Stable/Uncomplicated  Clinical Presentation Rationale: based on medical and personal factors listed in PT evaluation  Clinical Decision Making (Complexity): Low complexity    PLAN OF CARE  Treatment Interventions:  Modalities: Dry Needling, Mechanical Traction  Interventions: Manual Therapy, Neuromuscular Re-education, Therapeutic Activity, Therapeutic Exercise, Self-Care/Home Management    Long Term Goals     PT Goal 1  Goal Identifier: reaching up  Goal Description: Pt will be able to reach up and style hair with pain 0-1/10  Target Date: 12/31/24  PT Goal 2  Goal Identifier: HEP  Goal Description: Pt will demonstrate a good understanding of an extensive HEP for strength and stability of her spine to off support and prevent symptoms from occuring  Target Date: 12/31/24      Frequency of Treatment: 1x/week  Duration of Treatment: 10 visits    Recommended Referrals to Other Professionals:   Education Assessment:   Learner/Method: Patient    Risks and benefits of evaluation/treatment have been explained.   Patient/Family/caregiver agrees with Plan of Care.     Evaluation Time:     PT Eval, Low Complexity Minutes (52581): 17       Signing Clinician: THELMA Valencia  [facility]                                                                                   OUTPATIENT PHYSICAL THERAPY      PLAN OF TREATMENT FOR OUTPATIENT REHABILITATION   Patient's Last Name, First Name, Cayla Vuong    YOB: 1955   Provider's Name   DONN [facility]   Medical Record No.  [id number]     Onset Date: 09/25/24 (date of referral)  Start of Care Date: 10/02/24     Medical Diagnosis:  Cervical and Lumbar Pain      PT Treatment Diagnosis:  Cervical and Lumbar Pain Plan of Treatment  Frequency/Duration: 1x/week/ 10 visits    Certification date from 10/02/24 to 12/31/24         See note for plan of treatment details and functional goals     Bette Jewell, PT                         I CERTIFY THE NEED FOR THESE SERVICES FURNISHED UNDER        THIS PLAN OF TREATMENT AND WHILE UNDER MY CARE     (Physician attestation of this document indicates review and certification of the therapy plan).              Referring Provider:  Juan Diego Eastman    Initial Assessment  See Epic Evaluation- Start of Care Date: 10/02/24

## 2024-10-02 NOTE — TELEPHONE ENCOUNTER
Checking in with my clinical supervisor regarding if we can get this medication in clinic or not.     Debora GRANT RN     Principal Discharge DX:	Depression, unspecified depression type

## 2024-10-16 ENCOUNTER — THERAPY VISIT (OUTPATIENT)
Dept: PHYSICAL THERAPY | Facility: REHABILITATION | Age: 69
End: 2024-10-16
Attending: INTERNAL MEDICINE
Payer: COMMERCIAL

## 2024-10-16 DIAGNOSIS — M47.816 SPONDYLOSIS OF LUMBAR REGION WITHOUT MYELOPATHY OR RADICULOPATHY: ICD-10-CM

## 2024-10-16 DIAGNOSIS — M54.2 NECK PAIN, MUSCULOSKELETAL: Primary | ICD-10-CM

## 2024-10-16 PROCEDURE — 97110 THERAPEUTIC EXERCISES: CPT | Mod: GP | Performed by: PHYSICAL THERAPIST

## 2024-10-18 DIAGNOSIS — E78.5 HYPERLIPIDEMIA, UNSPECIFIED HYPERLIPIDEMIA TYPE: ICD-10-CM

## 2024-10-21 ENCOUNTER — MYC REFILL (OUTPATIENT)
Dept: INTERNAL MEDICINE | Facility: CLINIC | Age: 69
End: 2024-10-21
Payer: COMMERCIAL

## 2024-10-21 DIAGNOSIS — E78.5 HYPERLIPIDEMIA, UNSPECIFIED HYPERLIPIDEMIA TYPE: ICD-10-CM

## 2024-10-21 RX ORDER — ROSUVASTATIN CALCIUM 20 MG/1
20 TABLET, COATED ORAL AT BEDTIME
Qty: 90 TABLET | Refills: 2 | Status: SHIPPED | OUTPATIENT
Start: 2024-10-21 | End: 2024-11-12

## 2024-10-21 RX ORDER — ROSUVASTATIN CALCIUM 20 MG/1
20 TABLET, COATED ORAL AT BEDTIME
Qty: 90 TABLET | Refills: 0 | Status: SHIPPED | OUTPATIENT
Start: 2024-10-21

## 2024-10-23 DIAGNOSIS — M81.0 AGE-RELATED OSTEOPOROSIS WITHOUT CURRENT PATHOLOGICAL FRACTURE: Primary | ICD-10-CM

## 2024-10-23 DIAGNOSIS — Z92.29 PERSONAL HISTORY OF OTHER DRUG THERAPY: ICD-10-CM

## 2024-10-31 ENCOUNTER — TELEPHONE (OUTPATIENT)
Dept: INTERNAL MEDICINE | Facility: CLINIC | Age: 69
End: 2024-10-31
Payer: COMMERCIAL

## 2024-10-31 NOTE — PROGRESS NOTES
DISCHARGE  Reason for Discharge: Patient chooses to discontinue therapy.  In my Chart said condition has improved.     Equipment Issued:     Discharge Plan: Patient to continue home program.    Referring Provider:  Juan Diego Eastman         10/16/24 0500   Appointment Info   Signing clinician's name / credentials Bette Jewell, PT   Visits Used 2   Medical Diagnosis Cervical and Lumbar Pain   PT Tx Diagnosis Cervical and Lumbar Pain   Progress Note/Certification   Start of Care Date 10/02/24   Onset of illness/injury or Date of Surgery 09/25/24  (date of referral)   Therapy Frequency 1x/week   Predicted Duration 10 visits   Certification date from 10/02/24   Certification date to 12/31/24   Progress Note Due Date 12/31/24   PT Goal 1   Goal Identifier reaching up   Goal Description Pt will be able to reach up and style hair with pain 0-1/10   Target Date 12/31/24   PT Goal 2   Goal Identifier HEP   Goal Description Pt will demonstrate a good understanding of an extensive HEP for strength and stability of her spine to off support and prevent symptoms from occuring   Target Date 12/31/24   Subjective Report   Subjective Report I have been trying to do the chin tuck but unsure if I am doing it correct.  It did hurt a couple days when I did it.  The pain is intermittent   Objective Measure 1   Objective Measure NDI: 28.89%   Objective Measure 2   Objective Measure MIRTA: 20%   Therapeutic Procedure/Exercise   Therapeutic Procedures: strength, endurance, ROM, flexibility minutes (21248) 44   Ther Proc 1 Nu-step, 5 min warm up, workload 5   Ther Proc 1 - Details chin tuck and scapular retraction hold 10 seconds X 10 reps   Patient Response/Progress struggles with chin tucks and only applying a mild resistance with some of the exercises.   Ther Proc 2 stretches: scalene and levator scapule: hold 30 seconds x 1-3 reps B   Ther Proc 2 - Details stretches: single knee to chest, quadratus lumborum,  piriformis above and  below 90, sitting: piriformis and hip external rotation: hold 30 seconds X 1-3 reps B   Ther Proc 3 core engagement: TAr and buttocks   Ther Proc 3 - Details pelvic tilt-review, pt already doing   Skilled Intervention warm up, stretches and strenghtening for mobility, flexibility, strength and stability   Education   Learner/Method Patient   Plan   Plan for next session review stretches, core and midback strengthening, ? manual therapy   Comments   Comments Assessment:  Pt returns to physical therapy with neck and back pain.  She was still slightly confused on chin tucks so reviewed ex issued last visit.  She was very aggressive with the chin tucks and scapular retraction so edu on how to back the pressure off by at least half.  She struggles with many of the exercises and relaxing while doing them.  Many strategies were attempted to have pt work on this.  Recommend continued skilled physical therapy to address deficits and return patient to highest level of function. Therapy interventions being performed are medically necessary, are being delivered according to accepted standards of medical practice, and require the skills of a therapist to perform the services.   Total Session Time   Timed Code Treatment Minutes 44   Total Treatment Time (sum of timed and untimed services) 44

## 2024-10-31 NOTE — TELEPHONE ENCOUNTER
Reason for Call:  Appointment Request    Patient requesting this type of appt:  Nurse only appt for Prolia #17, currently scheduled for 11/11/24 but pt has conflict    Requested provider: MA/LINDA/LPN Visit    Reason patient unable to be scheduled: Appointment requires orders that are not found in chart    When does patient want to be seen/preferred time:  Needs to be/before 11/10/2024, pt has conflict on 11/11/24    Comments: PT was told by provider that she cannot have this appt later than six months from her last one which was 5/10/24. There is also no order for this in pt chart so unable to reschedule for pt with [facility].     Could we send this information to you in [facility] or would you prefer to receive a phone call?:   Patient would prefer a phone call   Okay to leave a detailed message?: Yes at Cell number on file:    Telephone Information:   Mobile [phone]       Call taken on 10/31/2024 at 8:37 AM by Brittany Kendrick

## 2024-11-06 ENCOUNTER — ANCILLARY PROCEDURE (OUTPATIENT)
Dept: BONE DENSITY | Facility: CLINIC | Age: 69
End: 2024-11-06
Attending: INTERNAL MEDICINE
Payer: COMMERCIAL

## 2024-11-06 DIAGNOSIS — M81.0 AGE-RELATED OSTEOPOROSIS WITHOUT CURRENT PATHOLOGICAL FRACTURE: ICD-10-CM

## 2024-11-06 PROCEDURE — 77080 DXA BONE DENSITY AXIAL: CPT | Mod: TC | Performed by: PHYSICIAN ASSISTANT

## 2024-11-06 PROCEDURE — 77091 TBS TECHL CALCULATION ONLY: CPT | Performed by: PHYSICIAN ASSISTANT

## 2024-11-12 ENCOUNTER — OFFICE VISIT (OUTPATIENT)
Dept: CARDIOLOGY | Facility: CLINIC | Age: 69
End: 2024-11-12
Payer: COMMERCIAL

## 2024-11-12 ENCOUNTER — ALLIED HEALTH/NURSE VISIT (OUTPATIENT)
Dept: FAMILY MEDICINE | Facility: CLINIC | Age: 69
End: 2024-11-12
Payer: COMMERCIAL

## 2024-11-12 VITALS
RESPIRATION RATE: 20 BRPM | OXYGEN SATURATION: 91 % | BODY MASS INDEX: 27.51 KG/M2 | DIASTOLIC BLOOD PRESSURE: 62 MMHG | WEIGHT: 138.5 LBS | SYSTOLIC BLOOD PRESSURE: 120 MMHG | HEART RATE: 75 BPM

## 2024-11-12 DIAGNOSIS — I25.10 CORONARY ARTERY DISEASE INVOLVING NATIVE CORONARY ARTERY OF NATIVE HEART WITHOUT ANGINA PECTORIS: ICD-10-CM

## 2024-11-12 DIAGNOSIS — M81.0 AGE-RELATED OSTEOPOROSIS WITHOUT CURRENT PATHOLOGICAL FRACTURE: Primary | ICD-10-CM

## 2024-11-12 DIAGNOSIS — R07.2 PRECORDIAL PAIN: Primary | ICD-10-CM

## 2024-11-12 PROCEDURE — G2211 COMPLEX E/M VISIT ADD ON: HCPCS | Performed by: INTERNAL MEDICINE

## 2024-11-12 PROCEDURE — 96372 THER/PROPH/DIAG INJ SC/IM: CPT | Performed by: INTERNAL MEDICINE

## 2024-11-12 PROCEDURE — 99207 PR NO CHARGE NURSE ONLY: CPT

## 2024-11-12 PROCEDURE — 99214 OFFICE O/P EST MOD 30 MIN: CPT | Performed by: INTERNAL MEDICINE

## 2024-11-12 NOTE — PROGRESS NOTES
Cardiology Progress Note     Assessment:  Coronary artery disease, status post elective stenting of LAD in July 2020  Chest pain, left-sided, nonexertional, chronic  Hypercholesterolemia on statin, suboptimal control      Plan:  Increase rosuvastatin to 40 mg a day recheck lipids in 2 months    She continues to have atypical left-sided chest pain.  The pain does not sound much like angina but she had somewhat similar pain before we stented her severe LAD stenosis.  Last year nuclear stress test did not show ischemia but she did have dilatation of the left ventricle during stress.  This really may indicate ischemia.  Because of persistence of the symptoms we will proceed with different form of imaging.  Will perform stress MR.    Follow-up in 6 to 12 months  The longitudinal plan of care for the diagnosis(es)/condition(s) as documented were addressed during this visit. Due to the added complexity in care, I will continue to support Cayla in the subsequent management and with ongoing continuity of care.   Subjective:   This is 69 year old female who comes in today for follow-up visit.  She continues to stay active.  She does yoga classes few times a week.  She continues to have left-sided chest pain the pain is not clearly exertional.  She has not had syncope or near syncope.  Her weight has been stable.  She is compliant with the medications.    Review of Systems:   Negative other than history of present illness    Objective:   /62 (BP Location: Right arm, Patient Position: Sitting, Cuff Size: Adult Regular)   Pulse 75   Resp 20   Wt 62.8 kg (138 lb 8 oz)   LMP  (LMP Unknown)   SpO2 91%   BMI 27.51 kg/m    Physical Exam:  GENERAL: no distress  NECK: No JVD  LUNGS: Clear to auscultation.  CARDIAC: regular rhythm, S1 & S2 normal.  No heaves, thrills, gallops or murmurs.  ABDOMEN: flat, negative hepatosplenomegaly, soft and non-tender.  EXTREMITIES: No evidence of cyanosis, clubbing or edema.    Current  Outpatient Medications   Medication Sig Dispense Refill    aspirin 81 mg chewable tablet [ASPIRIN 81 MG CHEWABLE TABLET] Chew 1 tablet (81 mg total) daily. Take aspirin indefinitely (for the rest of your life). 30 tablet 11    CHOLECALCIFEROL, VITAMIN D3, (VITAMIN D3 ORAL) [CHOLECALCIFEROL, VITAMIN D3, (VITAMIN D3 ORAL)] Take 5,000 Units by mouth daily. TABS      ferrous sulfate 325 (65 FE) MG tablet [FERROUS SULFATE 325 (65 FE) MG TABLET] Take 1 tablet by mouth daily. As directed      rosuvastatin (CRESTOR) 20 MG tablet TAKE 1 TABLET BY MOUTH AT BEDTIME 90 tablet 0    UNABLE TO FIND Take 1 tablet by mouth daily. MEDICATION NAME: Calcium      CALCIUM CITRATE-VITAMIN D3 ORAL Take 0.5 tablets by mouth daily (Patient not taking: Reported on 2024)         Cardiographics:    EC2020 read by me normal sinus rhythm within normal limits     ECG: Normal sinus rhythm within normal limits  CT coronary angiogram: 2020  The total Agatston calcium score is 17. A calcium score in this range places the individual in the 50th percentile when compared to an age and gender matched control group and implies a moderate risk of cardiac events in the next ten years.  There is mild to moderate disease in the proximal LAD estimated luminal stenosis of 30 to 50%.  There is a focal area of severe stenosis in the mid left anterior descending estimated at 70% or greater. Cannot fully exclude that this is artifactual but concerning for potential focal severe stenosis with soft plaque.  No significant obstructive disease in the circumflex and right coronary artery.     2021  Limited visulaization of the mid and distal left anterior descending artery stents due to metallic artifact, but stents are likely patent.  Otherwise minimal nonobstructive coronary artery disease.        Coronary angiogram: 2020  LM mild dz  LAD 90% serial stenoses in mid LAD, mild prox and distal LAD  LCx mild dz  RCA mild dz     EDP 13mmHg    "  Successful PCI of mid LAD with 3x16 and 2.75x12 Synergy JULIO (spot stenting rather than one long stent chosen in an attempt to avoid jailing ostium of D2)  0% residual, FLAKO 3 flow post    Stress test: October 2023    Lexiscan stress ECG negative for ischemia.    The nuclear stress test is probably negative for inducible myocardial ischemia or infarction.    Incidental finding of stress to rest cavity ratio is 1.45.  This may be a nonspecific finding but can be associated with multivessel disease   Lab Results    Chemistry/lipid CBC Cardiac Enzymes/BNP/TSH/INR   Recent Labs   Lab Test 09/26/24  0808   CHOL 163   HDL 43*   LDL 91   TRIG 145     Recent Labs   Lab Test 09/26/24  0808 09/27/23  0749 09/14/22  0835   LDL 91 69 64     Recent Labs   Lab Test 09/26/24  0808      POTASSIUM 4.4   CHLORIDE 107   CO2 26   *   BUN 17.3   CR 0.85   GFRESTIMATED 73   DAMIR 10.1     Recent Labs   Lab Test 09/26/24  0808 09/27/23  0749 09/14/22  0835   CR 0.85 0.88 0.84     Recent Labs   Lab Test 09/26/24  0808 09/27/23  0749 09/14/22  0835   A1C 6.2* 5.7* 5.8*          Recent Labs   Lab Test 09/26/24  0808   WBC 6.5   HGB 11.2*   HCT 36.6   MCV 63*        Recent Labs   Lab Test 09/26/24  0808 09/27/23  0749 09/14/22  0835   HGB 11.2* 11.1* 10.9*    Recent Labs   Lab Test 06/09/20  1308   TROPONINI <0.01     No results for input(s): \"BNP\", \"NTBNPI\", \"NTBNP\" in the last 79735 hours.  Recent Labs   Lab Test 09/26/24  0808   TSH 4.23*     No results for input(s): \"INR\" in the last 43675 hours.                  "

## 2024-11-12 NOTE — LETTER
11/12/2024    HÉCTOR TAN MD  [address] [place] [address]    RE: Cayla Kimani       Dear Colleague,     I had the pleasure of seeing Cayla Harman in the [facility].      Cardiology Progress Note     Assessment:  Coronary artery disease, status post elective stenting of LAD in July 2020  Chest pain, left-sided, nonexertional, chronic  Hypercholesterolemia on statin, suboptimal control      Plan:  Increase rosuvastatin to 40 mg a day recheck lipids in 2 months    She continues to have atypical left-sided chest pain.  The pain does not sound much like angina but she had somewhat similar pain before we stented her severe LAD stenosis.  Last year nuclear stress test did not show ischemia but she did have dilatation of the left ventricle during stress.  This really may indicate ischemia.  Because of persistence of the symptoms we will proceed with different form of imaging.  Will perform stress MR.    Follow-up in 6 to 12 months  The longitudinal plan of care for the diagnosis(es)/condition(s) as documented were addressed during this visit. Due to the added complexity in care, I will continue to support Cayla in the subsequent management and with ongoing continuity of care.   Subjective:   This is 69 year old female who comes in today for follow-up visit.  She continues to stay active.  She does yoga classes few times a week.  She continues to have left-sided chest pain the pain is not clearly exertional.  She has not had syncope or near syncope.  Her weight has been stable.  She is compliant with the medications.    Review of Systems:   Negative other than history of present illness    Objective:   /62 (BP Location: Right arm, Patient Position: Sitting, Cuff Size: Adult Regular)   Pulse 75   Resp 20   Wt 62.8 kg (138 lb 8 oz)   LMP  (LMP Unknown)   SpO2 91%   BMI 27.51 kg/m    Physical Exam:  GENERAL: no distress  NECK: No JVD  LUNGS: Clear to auscultation.  CARDIAC: regular  rhythm, S1 & S2 normal.  No heaves, thrills, gallops or murmurs.  ABDOMEN: flat, negative hepatosplenomegaly, soft and non-tender.  EXTREMITIES: No evidence of cyanosis, clubbing or edema.    Current Outpatient Medications   Medication Sig Dispense Refill     aspirin 81 mg chewable tablet [ASPIRIN 81 MG CHEWABLE TABLET] Chew 1 tablet (81 mg total) daily. Take aspirin indefinitely (for the rest of your life). 30 tablet 11     CHOLECALCIFEROL, VITAMIN D3, (VITAMIN D3 ORAL) [CHOLECALCIFEROL, VITAMIN D3, (VITAMIN D3 ORAL)] Take 5,000 Units by mouth daily. TABS       ferrous sulfate 325 (65 FE) MG tablet [FERROUS SULFATE 325 (65 FE) MG TABLET] Take 1 tablet by mouth daily. As directed       rosuvastatin (CRESTOR) 20 MG tablet TAKE 1 TABLET BY MOUTH AT BEDTIME 90 tablet 0     UNABLE TO FIND Take 1 tablet by mouth daily. MEDICATION NAME: Calcium       CALCIUM CITRATE-VITAMIN D3 ORAL Take 0.5 tablets by mouth daily (Patient not taking: Reported on 2024)         Cardiographics:    EC2020 read by me normal sinus rhythm within normal limits     ECG: Normal sinus rhythm within normal limits  CT coronary angiogram: 2020  The total Agatston calcium score is 17. A calcium score in this range places the individual in the 50th percentile when compared to an age and gender matched control group and implies a moderate risk of cardiac events in the next ten years.  There is mild to moderate disease in the proximal LAD estimated luminal stenosis of 30 to 50%.  There is a focal area of severe stenosis in the mid left anterior descending estimated at 70% or greater. Cannot fully exclude that this is artifactual but concerning for potential focal severe stenosis with soft plaque.  No significant obstructive disease in the circumflex and right coronary artery.     2021  Limited visulaization of the mid and distal left anterior descending artery stents due to metallic artifact, but stents are likely  "patent.  Otherwise minimal nonobstructive coronary artery disease.        Coronary angiogram: July 2020  LM mild dz  LAD 90% serial stenoses in mid LAD, mild prox and distal LAD  LCx mild dz  RCA mild dz     EDP 13mmHg     Successful PCI of mid LAD with 3x16 and 2.75x12 Synergy JULIO (spot stenting rather than one long stent chosen in an attempt to avoid jailing ostium of D2)  0% residual, FLAKO 3 flow post    Stress test: October 2023     Lexiscan stress ECG negative for ischemia.     The nuclear stress test is probably negative for inducible myocardial ischemia or infarction.     Incidental finding of stress to rest cavity ratio is 1.45.  This may be a nonspecific finding but can be associated with multivessel disease   Lab Results    Chemistry/lipid CBC Cardiac Enzymes/BNP/TSH/INR   Recent Labs   Lab Test 09/26/24  0808   CHOL 163   HDL 43*   LDL 91   TRIG 145     Recent Labs   Lab Test 09/26/24  0808 09/27/23  0749 09/14/22  0835   LDL 91 69 64     Recent Labs   Lab Test 09/26/24  0808      POTASSIUM 4.4   CHLORIDE 107   CO2 26   *   BUN 17.3   CR 0.85   GFRESTIMATED 73   DAMIR 10.1     Recent Labs   Lab Test 09/26/24  0808 09/27/23  0749 09/14/22  0835   CR 0.85 0.88 0.84     Recent Labs   Lab Test 09/26/24  0808 09/27/23  0749 09/14/22  0835   A1C 6.2* 5.7* 5.8*          Recent Labs   Lab Test 09/26/24  0808   WBC 6.5   HGB 11.2*   HCT 36.6   MCV 63*        Recent Labs   Lab Test 09/26/24  0808 09/27/23  0749 09/14/22  0835   HGB 11.2* 11.1* 10.9*    Recent Labs   Lab Test 06/09/20  1308   TROPONINI <0.01     No results for input(s): \"BNP\", \"NTBNPI\", \"NTBNP\" in the last 32290 hours.  Recent Labs   Lab Test 09/26/24  0808   TSH 4.23*     No results for input(s): \"INR\" in the last 11458 hours.                      Thank you for allowing me to participate in the care of your patient.      Sincerely,     Say Alexandra, MD     [facility]  cc: "   Juan Diego Eastman MD  [address] [place]  [place] [address]

## 2024-11-12 NOTE — PATIENT INSTRUCTIONS
Cayla Harman,    It was a pleasure to see you today at [facility].     My recommendations after this visit include:    Increase rosuvastatin to 40 mg  a day  Check lipids in 2 months  Stress MR  to assess for blockages    HERMES Morris MD, FACC, FASE

## 2024-11-12 NOTE — PROGRESS NOTES
Clinic Administered Medication Documentation      Prolia Documentation    Indication: Prolia  (denosumab) is a prescription medicine used to treat osteoporosis in patients who:   Are at high risk for fracture, meaning patients who have had a fracture related to osteoporosis, or who have multiple risk factors for fracture.  Cannot use another osteoporosis medicine or other osteoporosis medicines did not work well.  The timeline for early/late injections would be 4 weeks early and any time after the 6 month maged. If a patient receives their injection late, then the subsequent injection would be 6 months from the date that they actually received the injection.    When was the last injection?  05/10/2024  Was the last injection at least 6 months ago? Yes  Has the prior authorization been completed?  Yes  Is there an active order (written within the past 365 days, with administrations remaining, not ) in the chart?  Yes   GFR Estimate   Date Value Ref Range Status   2024 73 >60 mL/min/1.73m2 Final     Comment:     eGFR calculated using  CKD-EPI equation.   2021 >60 >60 mL/min/1.73m2 Final     Has patient had a GFR within the last 12 months? Yes   Is GFR under 30, or patient has a diagnosis of CKD4 or CKD5? No   Patient denies gastric bypass or parathyroid surgery in past 6 months? Yes - patient denies.   Patient denies dental work in the past two months involving drilling into the bone, such as implants/extractions, oral surgery or a tooth extraction that has not healed yet?  Yes  Patient denies plans for an emergency tooth extraction within the next week? Yes    The following steps were completed to comply with the REMS program for Prolia:  Reviewed information in the Medication Guide, including the serious risks of Prolia  and the symptoms of each risk.  Advised patient to seek prompt medical attention if they have signs or symptoms of any of the serious risks.  Provided each patient a copy of  the Medication Guide and Patient Guide.    Prior to injection, verified patient identity using patient's name and date of birth. Medication was administered. Please see MAR and medication order for additional information. Patient instructed to remain in clinic for 15 minutes and report any adverse reaction to staff immediately.    Vial/Syringe: Syringe  Was this medication supplied by the patient? No  Verified that the patient has administrations remaining in their prescription.

## 2024-11-25 ENCOUNTER — LAB (OUTPATIENT)
Dept: LAB | Facility: CLINIC | Age: 69
End: 2024-11-25
Payer: COMMERCIAL

## 2024-11-25 DIAGNOSIS — E78.2 MIXED HYPERLIPIDEMIA: ICD-10-CM

## 2024-11-25 LAB — TSH SERPL DL<=0.005 MIU/L-ACNC: 3.83 UIU/ML (ref 0.3–4.2)

## 2024-11-25 PROCEDURE — 36415 COLL VENOUS BLD VENIPUNCTURE: CPT

## 2024-11-25 PROCEDURE — 84443 ASSAY THYROID STIM HORMONE: CPT

## 2024-12-05 ENCOUNTER — OFFICE VISIT (OUTPATIENT)
Dept: INTERNAL MEDICINE | Facility: CLINIC | Age: 69
End: 2024-12-05
Payer: COMMERCIAL

## 2024-12-05 VITALS
OXYGEN SATURATION: 97 % | RESPIRATION RATE: 16 BRPM | SYSTOLIC BLOOD PRESSURE: 116 MMHG | TEMPERATURE: 97.7 F | DIASTOLIC BLOOD PRESSURE: 62 MMHG | HEART RATE: 70 BPM

## 2024-12-05 DIAGNOSIS — M81.0 AGE-RELATED OSTEOPOROSIS WITHOUT CURRENT PATHOLOGICAL FRACTURE: Primary | ICD-10-CM

## 2024-12-05 DIAGNOSIS — R05.1 ACUTE COUGH: ICD-10-CM

## 2024-12-05 PROBLEM — R07.2 PRECORDIAL PAIN: Status: RESOLVED | Noted: 2020-06-12 | Resolved: 2024-12-05

## 2024-12-05 RX ORDER — BENZONATATE 100 MG/1
100 CAPSULE ORAL 3 TIMES DAILY PRN
Qty: 20 CAPSULE | Refills: 1 | Status: SHIPPED | OUTPATIENT
Start: 2024-12-05

## 2024-12-05 NOTE — PATIENT INSTRUCTIONS
Prolia 18th in May 2025 with my nurse.  Prolia 19th in 6 months with me.    You can checkl vit D level on Jan 13th when coming for fasting labs. I placed the order.    DXA in 11/2026 .   Phone number to schedule [phone].    Daily calcium need is 2773-7459 mg a day from the diet and supplements.  Calcium citrate is easier to digest.  Vitamin D 2000 IU daily recommended.    Risk of rebound vertebral fractures is higher when Prolia suddenly stopped or dose was missed.      Prolia and Covid vaccine should be  for at least a week.    Patient education:  Patients advised to maintain good oral hygiene during treatment, because of the risk for osteonecrosis of the jaw.   The risk of osteonecrosis of the jaw with Prolia therapy is extremely low.   If a patient is late for Prolia therapy (>3 wks) a rapid rebound of bone loss can occur which is highly concerning and important to try to prevent to optimize bone health.   Therefore if an invasive dental procedure is required, primarily extraction or implant, while on Prolia therapy, the recommendation is to time the dental procedure optimally 4 months after the most recent dose of Prolia allowing 6-8 weeks for healing prior to next dose of Prolia.   This is based on the updated 2022 Recommendations from the American Association of Oral and Maxillofacial Surgeons.   We also recommend discussing with dentist or oral surgeon if pretreatment prophylactic oral rinses and antibiotics would be beneficial.   Optimizing oral hygiene before any invasive dental procedure significantly lowers ONJ risk.     There is a greater risk of severe hypocalcemia following denosumab administration and patient is advised that we will have to monitor calcium, phosphorous, and magnesium levels. Risk of infection is increased with denosumab use, so patient will inform me if has more frequent infections.     Atypical femur fracture  has been reported in patients receiving denosumab. The  fractures may occur anywhere along the femoral shaft (may be bilateral) and commonly occur with minimal to no trauma to the area. Some patients experience prodromal thigh or groin pain weeks or months before the fracture occurs. Contralateral limb should be assessed if AFF occurs.     Multiple vertebral column fracture has been reported following discontinuation of therapy. Hypertension and increased cholesterol were reported with denosumab use.      Discussed 10-year data of Prolia. Discussed the importance of being on time and consistent with Prolia use to prevent rapid bone of osteoclastic activity.  Discussed that if Prolia is discontinued we will likely need to utilize an antiresorptive, such as Reclast, to help prevent rapid rebound of osteoclast activity. Often more than 1 dose is required.  Labs yearly to ensure calcium and vitamin D optimized while patient on Prolia.
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
## 2024-12-30 ENCOUNTER — MYC MEDICAL ADVICE (OUTPATIENT)
Dept: INTERNAL MEDICINE | Facility: CLINIC | Age: 69
End: 2024-12-30
Payer: COMMERCIAL

## 2024-12-31 ENCOUNTER — OFFICE VISIT (OUTPATIENT)
Dept: INTERNAL MEDICINE | Facility: CLINIC | Age: 69
End: 2024-12-31
Payer: COMMERCIAL

## 2024-12-31 VITALS
HEART RATE: 64 BPM | TEMPERATURE: 97.8 F | BODY MASS INDEX: 27.09 KG/M2 | SYSTOLIC BLOOD PRESSURE: 152 MMHG | HEIGHT: 60 IN | WEIGHT: 138 LBS | RESPIRATION RATE: 16 BRPM | OXYGEN SATURATION: 98 % | DIASTOLIC BLOOD PRESSURE: 76 MMHG

## 2024-12-31 DIAGNOSIS — I25.10 CORONARY ARTERY DISEASE DUE TO CALCIFIED CORONARY LESION: ICD-10-CM

## 2024-12-31 DIAGNOSIS — R03.0 ELEVATED BLOOD PRESSURE READING WITHOUT DIAGNOSIS OF HYPERTENSION: ICD-10-CM

## 2024-12-31 DIAGNOSIS — J32.9 CHRONIC SINUSITIS, UNSPECIFIED LOCATION: Primary | ICD-10-CM

## 2024-12-31 DIAGNOSIS — I25.84 CORONARY ARTERY DISEASE DUE TO CALCIFIED CORONARY LESION: ICD-10-CM

## 2024-12-31 PROCEDURE — 99214 OFFICE O/P EST MOD 30 MIN: CPT | Performed by: INTERNAL MEDICINE

## 2024-12-31 RX ORDER — FLUTICASONE PROPIONATE 50 MCG
2 SPRAY, SUSPENSION (ML) NASAL DAILY
Qty: 16 G | Refills: 2 | Status: SHIPPED | OUTPATIENT
Start: 2024-12-31

## 2024-12-31 RX ORDER — AZITHROMYCIN 250 MG/1
TABLET, FILM COATED ORAL
Qty: 6 TABLET | Refills: 0 | Status: SHIPPED | OUTPATIENT
Start: 2024-12-31 | End: 2025-01-05

## 2024-12-31 ASSESSMENT — ENCOUNTER SYMPTOMS: COUGH: 1

## 2024-12-31 NOTE — PATIENT INSTRUCTIONS
I suspect you have chronic sinusitis with postnasal drip cough.  You have significant drainage down the back of your throat.    Treat sinusitis with azithromycin antibiotic for 5 days.  Use saline irrigation or Faviola pot once or twice a day.  Okay to use cough lozenges and stay well-hydrated to reduce throat irritation.    Use nasal steroid, fluticasone/Flonase 2 sprays each nostril once a day for at least 2-3 weeks to reduce inflammation of your nose.    You should be better in 1-2 weeks.  If you are not better or if worsening symptoms, get reevaluated.  You could consider seeing an ENT clinic next if needed.    Check your blood pressure on your own.  Goal blood pressure less than 135/85.  If it is running higher than that, make an appointment to be evaluated in clinic for blood pressure
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
## 2024-12-31 NOTE — TELEPHONE ENCOUNTER
Incoming call from patient. Please see [employer] message. She denies fevers. Reports it is a dry cough. Medication helped but her cough is still present. She continues to have a strange feeling in her throat and requests to be seen. Appointment scheduled in open spot with available provider.

## 2025-01-06 ENCOUNTER — TELEPHONE (OUTPATIENT)
Dept: CARDIOLOGY | Facility: HOSPITAL | Age: 70
End: 2025-01-06
Payer: COMMERCIAL

## 2025-01-07 ENCOUNTER — HOSPITAL ENCOUNTER (OUTPATIENT)
Dept: MRI IMAGING | Facility: HOSPITAL | Age: 70
Discharge: HOME | End: 2025-01-07
Attending: INTERNAL MEDICINE
Payer: COMMERCIAL

## 2025-01-07 VITALS — HEART RATE: 81 BPM | DIASTOLIC BLOOD PRESSURE: 54 MMHG | OXYGEN SATURATION: 97 % | SYSTOLIC BLOOD PRESSURE: 114 MMHG

## 2025-01-07 DIAGNOSIS — I51.7 TRANSIENT ISCHEMIC DILATION OF LEFT VENTRICLE OF HEART: ICD-10-CM

## 2025-01-07 DIAGNOSIS — I25.10 CORONARY ARTERY DISEASE INVOLVING NATIVE CORONARY ARTERY OF NATIVE HEART: Primary | ICD-10-CM

## 2025-01-07 DIAGNOSIS — R07.9 CHEST PAIN, UNSPECIFIED TYPE: ICD-10-CM

## 2025-01-07 DIAGNOSIS — Z95.5 S/P PRIMARY ANGIOPLASTY WITH CORONARY STENT: ICD-10-CM

## 2025-01-07 LAB
ATRIAL RATE - MUSE: 65 BPM
ATRIAL RATE - MUSE: 66 BPM
DIASTOLIC BLOOD PRESSURE - MUSE: NORMAL MMHG
DIASTOLIC BLOOD PRESSURE - MUSE: NORMAL MMHG
INTERPRETATION ECG - MUSE: NORMAL
INTERPRETATION ECG - MUSE: NORMAL
P AXIS - MUSE: 10 DEGREES
P AXIS - MUSE: 34 DEGREES
PR INTERVAL - MUSE: 140 MS
PR INTERVAL - MUSE: 150 MS
QRS DURATION - MUSE: 82 MS
QRS DURATION - MUSE: 82 MS
QT - MUSE: 410 MS
QT - MUSE: 426 MS
QTC - MUSE: 429 MS
QTC - MUSE: 443 MS
R AXIS - MUSE: 102 DEGREES
R AXIS - MUSE: 93 DEGREES
SYSTOLIC BLOOD PRESSURE - MUSE: NORMAL MMHG
SYSTOLIC BLOOD PRESSURE - MUSE: NORMAL MMHG
T AXIS - MUSE: 19 DEGREES
T AXIS - MUSE: 32 DEGREES
VENTRICULAR RATE- MUSE: 65 BPM
VENTRICULAR RATE- MUSE: 66 BPM

## 2025-01-07 PROCEDURE — 255N000002 HC RX 255 OP 636: Performed by: INTERNAL MEDICINE

## 2025-01-07 PROCEDURE — A9585 GADOBUTROL INJECTION: HCPCS | Performed by: INTERNAL MEDICINE

## 2025-01-07 PROCEDURE — 93016 CV STRESS TEST SUPVJ ONLY: CPT | Performed by: INTERNAL MEDICINE

## 2025-01-07 PROCEDURE — 93018 CV STRESS TEST I&R ONLY: CPT | Performed by: INTERNAL MEDICINE

## 2025-01-07 PROCEDURE — 75563 CARD MRI W/STRESS IMG & DYE: CPT

## 2025-01-07 PROCEDURE — 250N000011 HC RX IP 250 OP 636: Performed by: INTERNAL MEDICINE

## 2025-01-07 PROCEDURE — 75563 CARD MRI W/STRESS IMG & DYE: CPT | Mod: 26 | Performed by: INTERNAL MEDICINE

## 2025-01-07 PROCEDURE — 93005 ELECTROCARDIOGRAM TRACING: CPT

## 2025-01-07 PROCEDURE — 999N000122 MR MYOCARDIUM  OVERREAD

## 2025-01-07 RX ORDER — REGADENOSON 0.08 MG/ML
0.4 INJECTION, SOLUTION INTRAVENOUS ONCE
Status: COMPLETED | OUTPATIENT
Start: 2025-01-07 | End: 2025-01-07

## 2025-01-07 RX ORDER — GADOBUTROL 604.72 MG/ML
12 INJECTION INTRAVENOUS ONCE
Status: COMPLETED | OUTPATIENT
Start: 2025-01-07 | End: 2025-01-07

## 2025-01-07 RX ADMIN — REGADENOSON 0.4 MG: 0.08 INJECTION, SOLUTION INTRAVENOUS at 09:45

## 2025-01-07 RX ADMIN — GADOBUTROL 12 ML: 604.72 INJECTION INTRAVENOUS at 09:25

## 2025-01-07 NOTE — PROGRESS NOTES
Having fluttering in the left side of chest which similar to last time needed stent.  Denies shortness of breath.  Mild fatigue.  Here for further cardiac evaluation.  Beth Huang RN

## 2025-01-13 ENCOUNTER — LAB (OUTPATIENT)
Dept: CARDIOLOGY | Facility: CLINIC | Age: 70
End: 2025-01-13
Payer: COMMERCIAL

## 2025-01-13 DIAGNOSIS — M81.0 AGE-RELATED OSTEOPOROSIS WITHOUT CURRENT PATHOLOGICAL FRACTURE: ICD-10-CM

## 2025-01-13 DIAGNOSIS — I25.10 CORONARY ARTERY DISEASE INVOLVING NATIVE CORONARY ARTERY OF NATIVE HEART WITHOUT ANGINA PECTORIS: ICD-10-CM

## 2025-01-13 LAB
CHOLEST SERPL-MCNC: 135 MG/DL
FASTING STATUS PATIENT QL REPORTED: YES
HDLC SERPL-MCNC: 54 MG/DL
LDLC SERPL CALC-MCNC: 59 MG/DL
NONHDLC SERPL-MCNC: 81 MG/DL
TRIGL SERPL-MCNC: 111 MG/DL
VIT D+METAB SERPL-MCNC: 65 NG/ML (ref 20–50)

## 2025-01-13 PROCEDURE — 36415 COLL VENOUS BLD VENIPUNCTURE: CPT

## 2025-01-13 PROCEDURE — 80061 LIPID PANEL: CPT

## 2025-01-15 ENCOUNTER — TELEPHONE (OUTPATIENT)
Dept: INTERNAL MEDICINE | Facility: CLINIC | Age: 70
End: 2025-01-15
Payer: COMMERCIAL

## 2025-01-15 NOTE — TELEPHONE ENCOUNTER
Radha, from [employer], called  into the clinic, on 1/15/25, regarding a prior authorization for hydroxychloroquine.    Stated that the reference number is:[id number]    Writer reviewed the patient's chart and the hydroxychloroquine is not listed in the patient's current medication list or the patient's medication history.    Writer could not find that the hydroxychloroquine was prescribed by any provider in the patient's history, although Sofia stated that the PCP had prescribed the hydroxychloroquine.    Writer could also not find a prior authorization in the patient's chart for hydroxychloroquine.    Writer relayed the above information to Radha, who verbalized understanding, and there are no other questions or concerns at this time.    Lissette Ferro RN, BSN  [facility]

## 2025-03-02 ENCOUNTER — HEALTH MAINTENANCE LETTER (OUTPATIENT)
Age: 70
End: 2025-03-02

## 2025-03-14 ENCOUNTER — ORDERS ONLY (AUTO-RELEASED) (OUTPATIENT)
Dept: INTERNAL MEDICINE | Facility: CLINIC | Age: 70
End: 2025-03-14
Payer: COMMERCIAL

## 2025-03-14 DIAGNOSIS — Z12.11 SPECIAL SCREENING FOR MALIGNANT NEOPLASMS, COLON: ICD-10-CM

## 2025-04-02 ENCOUNTER — PATIENT OUTREACH (OUTPATIENT)
Dept: CARE COORDINATION | Facility: CLINIC | Age: 70
End: 2025-04-02
Payer: COMMERCIAL

## 2025-04-03 DIAGNOSIS — Z12.11 COLON CANCER SCREENING: ICD-10-CM

## 2025-04-17 ENCOUNTER — ORDERS ONLY (AUTO-RELEASED) (OUTPATIENT)
Dept: URGENT CARE | Facility: CLINIC | Age: 70
End: 2025-04-17
Payer: COMMERCIAL

## 2025-04-17 DIAGNOSIS — Z12.11 COLON CANCER SCREENING: ICD-10-CM

## 2025-04-30 LAB — NONINV COLON CA DNA+OCC BLD SCRN STL QL: NEGATIVE

## 2025-05-13 ENCOUNTER — ALLIED HEALTH/NURSE VISIT (OUTPATIENT)
Dept: FAMILY MEDICINE | Facility: CLINIC | Age: 70
End: 2025-05-13
Payer: COMMERCIAL

## 2025-05-13 ENCOUNTER — TELEPHONE (OUTPATIENT)
Dept: INTERNAL MEDICINE | Facility: CLINIC | Age: 70
End: 2025-05-13

## 2025-05-13 DIAGNOSIS — M81.0 AGE-RELATED OSTEOPOROSIS WITHOUT CURRENT PATHOLOGICAL FRACTURE: Primary | ICD-10-CM

## 2025-05-13 PROCEDURE — 96372 THER/PROPH/DIAG INJ SC/IM: CPT | Performed by: INTERNAL MEDICINE

## 2025-05-13 PROCEDURE — 99207 PR NO CHARGE NURSE ONLY: CPT

## 2025-05-13 NOTE — PROGRESS NOTES
Clinic Administered Medication Documentation      Prolia Documentation    Indication: Prolia  (denosumab) is a prescription medicine used to treat osteoporosis in patients who:   Are at high risk for fracture, meaning patients who have had a fracture related to osteoporosis, or who have multiple risk factors for fracture.  Cannot use another osteoporosis medicine or other osteoporosis medicines did not work well.  The timeline for early/late injections would be 4 weeks early and any time after the 6 month maged. If a patient receives their injection late, then the subsequent injection would be 6 months from the date that they actually received the injection.    When was the last injection?  24  Was the last injection at least 6 months ago? Yes  Has the prior authorization been completed?  Yes  Is there an active order (written within the past 365 days, with administrations remaining, not ) in the chart?  Yes   GFR Estimate   Date Value Ref Range Status   2024 73 >60 mL/min/1.73m2 Final     Comment:     eGFR calculated using  CKD-EPI equation.   2021 >60 >60 mL/min/1.73m2 Final     Has patient had a GFR within the last 12 months? Yes   Is GFR under 30, or patient has a diagnosis of CKD4 or CKD5? No   Patient denies gastric bypass or parathyroid surgery in past 6 months? Yes - patient denies.   Patient denies undergoing any dental procedures involving drilling into the bone, such as implants, extractions, or oral surgery, within the past two months that have not yet healed?  Yes - patient denies  Patient denies plans for an emergency tooth extraction within the next week? Yes    The following steps were completed to comply with the REMS program for Prolia:  Reviewed information in the Medication Guide, including the serious risks of Prolia  and the symptoms of each risk.  Advised patient to seek prompt medical attention if they have signs or symptoms of any of the serious risks.  Provided  each patient a copy of the Medication Guide and Patient Guide.    Prior to injection, verified patient identity using patient's name and date of birth. Medication was administered. Please see MAR and medication order for additional information. Patient instructed to remain in clinic for 15 minutes and report any adverse reaction to staff immediately.    Vial/Syringe: Syringe  Was this medication supplied by the patient? No  Verified that the patient has administrations remaining in their prescription.

## 2025-05-13 NOTE — TELEPHONE ENCOUNTER
Outgoing call to patient. Relayed PCP's detailed message. RN appointment scheduled for prolia injection on 11/13/25 and Dr. PATEL appointment on 11/25/25 cancelled per message below.     Pt states she will schedule an appointment with Dr. PATEL during her next visit on 11/13/25, will make a note in appointment notes. No further questions/concerns at this time. Thank you.

## 2025-06-24 ENCOUNTER — OFFICE VISIT (OUTPATIENT)
Dept: INTERNAL MEDICINE | Facility: CLINIC | Age: 70
End: 2025-06-24
Payer: COMMERCIAL

## 2025-06-24 ENCOUNTER — RESULTS FOLLOW-UP (OUTPATIENT)
Dept: INTERNAL MEDICINE | Facility: CLINIC | Age: 70
End: 2025-06-24

## 2025-06-24 VITALS
DIASTOLIC BLOOD PRESSURE: 65 MMHG | RESPIRATION RATE: 16 BRPM | HEART RATE: 68 BPM | BODY MASS INDEX: 27.29 KG/M2 | OXYGEN SATURATION: 97 % | HEIGHT: 60 IN | WEIGHT: 139 LBS | TEMPERATURE: 98 F | SYSTOLIC BLOOD PRESSURE: 121 MMHG

## 2025-06-24 DIAGNOSIS — J02.9 PHARYNGITIS, UNSPECIFIED ETIOLOGY: Primary | ICD-10-CM

## 2025-06-24 DIAGNOSIS — J39.2 THROAT IRRITATION: ICD-10-CM

## 2025-06-24 LAB
DEPRECATED S PYO AG THROAT QL EIA: NEGATIVE
S PYO DNA THROAT QL NAA+PROBE: NOT DETECTED

## 2025-06-24 PROCEDURE — 3078F DIAST BP <80 MM HG: CPT | Performed by: INTERNAL MEDICINE

## 2025-06-24 PROCEDURE — 99213 OFFICE O/P EST LOW 20 MIN: CPT | Performed by: INTERNAL MEDICINE

## 2025-06-24 PROCEDURE — 3074F SYST BP LT 130 MM HG: CPT | Performed by: INTERNAL MEDICINE

## 2025-06-24 PROCEDURE — G2211 COMPLEX E/M VISIT ADD ON: HCPCS | Performed by: INTERNAL MEDICINE

## 2025-06-24 PROCEDURE — 87651 STREP A DNA AMP PROBE: CPT | Performed by: INTERNAL MEDICINE

## 2025-06-24 RX ORDER — BENZONATATE 100 MG/1
100 CAPSULE ORAL 3 TIMES DAILY PRN
Qty: 30 CAPSULE | Refills: 1 | Status: SHIPPED | OUTPATIENT
Start: 2025-06-24
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
## 2025-06-24 NOTE — PATIENT INSTRUCTIONS
Acute illness visit June 24, 2025  She is a retired [employer] .    Sore throat and hoarseness this been going on around 2 weeks (started approximately Ernestina 10, 2025, which she associated with some changes to dietary supplements, but I wonder if this might either be a viral infection, allergies, consider possibility of strep (past history), consider possibility of COVID (although not having systemic symptoms to suggest COVID)    I looked in her throat antibiotics CT looks normal.  She is using some Kleenex because of nasal congestion.  She sounds may be slightly hoarse this morning.    Lets run a strep test.  I would like to prescribe her some Tessalon Perles which might help with throat irritation and associated cough.  Also analgesic throat sprays can be useful, also gargling with salt water.      Coughing about 3 weeks November 2024, triggered by throat irritation, I believed this is viral URI    Mechanical low back pain and neck pain  She reports pain in the midline of the lumbar area, without signs of radiculopathy.  I am pretty sure this is mechanical low back pain, probably generated from facet joints.  Lets get lumbar spine x-ray to see what degree of degenerative arthritic changes she may already have.  Will have her see physical therapy to work on core muscle strengthening, and flexibility.     She has similar mechanical neck pain, and is slightly tender at the insertion of the extensor muscles on the base of the skull.  Again, no radiculopathy symptoms.  Similarly we will get C-spine x-ray, and have physical therapy work on neck strengthening.  I demonstrated for her the chin tuck exercise.  I asked her to be particularly cautious with range of motion neck exercises, avoiding the extremes of flexion or extension.     Protein powder-- recommend erythritol-free     Coronary artery disease, status post elective stenting of LAD in July 2020, no symptoms of angina, heart failure, or arrhythmia, on aggressive  risk factor modification with high intensity rosuvastatin and baby aspirin  Took clopidogrel for 1 year after her angioplasty     S/p PCI of mid LAD with 3x16 and 2.75x12 Synergy JULIO (spot stenting rather than one long stent chosen in an attempt to avoid jailing ostium of D2)  0% residual, FLAKO 3 flow post  January 2021    Limited visulaization of the mid and distal left anterior descending artery stents due to metallic artifact, but stents are likely patent.    Otherwise minimal nonobstructive coronary artery disease.     She maintains a vegetarian diet, and tries to stay physically active     Hyperlipidemia in the context of coronary disease, on high intensity rosuvastatin, target LDL less than 70  rosuvastatin (CRESTOR) 40 MG tablet (dose increased November 2024 Anabella Morris)     1-  Direct Measure HDL  >=50 mg/dL 54     LDL Cholesterol Calculated  <100 mg/dL 59     Triglycerides  <150 mg/dL 111      Microcytic anemia, she has some form of thalassemia  She recalls that she has had a hemoglobin electrophoresis done many years ago.  She does take an iron tablet daily.     9-  Hemoglobin  11.7 - 15.7 g/dL 11.1 Low       MCV  78 - 100 fL 65 Low       9-  Hemoglobin 11.7 - 15.7 g/dL 10.9 Low       MCV 78 - 100 fL 63 Low       Ferritin 11 - 328 ng/mL 137       Iron 37 - 145 ug/dL 73       Iron Sat Index 15 - 46 % 27       Vasomotor versus allergic rhinitis  I told her she could try the over-the-counter nasal steroid fluticasone (brand-name Flonase) 1 or 2 sprays per nostril per day, which can be used throughout allergy season, or even long-term for vasomotor rhinitis.     She has been doing saline nasal irrigation, which is fine, she could certainly continue that, and then use the nasal steroid spray after the irrigation.     Osteoporosis  As of 2023, Ms. Harman has resumed Billie, I and works with Dr. Simpson for the monitoring and surveillance  Prolia 16th 5-  Scheduled for Prolia 17th  on 11/12/24.      She has had 1 infusion of Reclast, and may have had a week of side effects either from Reclast or a COVID booster that she got at about the same time.  Reported symptoms - dry cough, runny nose, muscle and bone pain, body aches after Reclast.      10-  EXAM: DX HIP/PELVIS/SPINE  LOCATION: [facility]  DATE/TIME: 10/10/2022 9:51 AM  COMPARISON: 6/11/2019  Lumbar Spine: L1-L4: BMD: 0.876 g/cm2. T-score: -2.5. Z-score: -0.9  RIGHT Hip Total: BMD: 0.828 g/cm2. T-score: -1.4. Z-score: 0.0  RIGHT Hip Femoral neck: BMD: 0.706 g/cm2. T-score: -2.4. Z-score: -0.8  LEFT Hip Total: BMD: 0.914 g/cm2. T-score: -0.7. Z-score: 0.6  LEFT Hip Femoral neck: BMD: 0.790 g/cm2. T-score: -1.8. Z-score: -0.2  COMPARISON: There has been a 7.5 % decrease in lumbar spine BMD. There has been a 5.6 % decrease in bilateral hip BMD.                   IMPRESSION: OSTEOPOROSIS. T score meets the World Health Organization (WHO) criteria for osteoporosis at one or more measured sites. The risk of osteoporotic fracture increased approximately two-fold for each SD decrease in T-score.      Postmenopause  BILATERAL FULL FIELD DIGITAL SCREENING MAMMOGRAM WITH TOMOSYNTHESIS  Performed on: 9/27/23     Constipation, she uses Benefiber which is fine.  If she needs something a bit stronger, she could use the osmotic laxative MiraLAX powder, which is safe to use even on a daily basis.     Primary osteoarthritis right knee  She has been working with [facility], has seen a physical therapist who gave her set of exercises that she has been doing on and off.  I suggested isometric exercise to strengthen the quadriceps muscle, without stressing the joint     I told her she could try topical diclofenac, which is known as Voltaren gel and is available over-the-counter.  Applied 3 or 4 times a day.     Mild stress incontinence and urgency     Colon screening  4-  COLOGUARD-ABSTRACT  Negative Negative      Cologua 4-  COLOGUARD-ABSTRACT Negative       Tested positive for COVID-19 May 2022, after having had 4 injections of Moderna